# Patient Record
Sex: MALE | Race: WHITE | NOT HISPANIC OR LATINO | Employment: FULL TIME | URBAN - METROPOLITAN AREA
[De-identification: names, ages, dates, MRNs, and addresses within clinical notes are randomized per-mention and may not be internally consistent; named-entity substitution may affect disease eponyms.]

---

## 2017-02-14 DIAGNOSIS — K63.5 COLON POLYPS: Primary | ICD-10-CM

## 2017-02-14 RX ORDER — SODIUM CHLORIDE 0.9 % (FLUSH) 0.9 %
1-10 SYRINGE (ML) INJECTION AS NEEDED
Status: CANCELLED | OUTPATIENT
Start: 2017-02-14

## 2017-02-20 RX ORDER — CLOPIDOGREL BISULFATE 75 MG/1
TABLET ORAL
Qty: 90 TABLET | Refills: 3 | Status: SHIPPED | OUTPATIENT
Start: 2017-02-20 | End: 2018-01-14 | Stop reason: SDUPTHER

## 2017-02-27 ENCOUNTER — TELEPHONE (OUTPATIENT)
Dept: GASTROENTEROLOGY | Facility: CLINIC | Age: 82
End: 2017-02-27

## 2017-02-27 NOTE — TELEPHONE ENCOUNTER
----- Message from Olaf Larry MD sent at 2/21/2017  3:15 PM EST -----  Regarding: RE: Plavix  This is OK from my standpoint.    TT  ----- Message -----     From: Jennie Crowell RN     Sent: 2/20/2017   1:53 PM       To: Olaf Larry MD  Subject: Plavix                                           Dr Larry,  Mr Lopez has submitted OA paperwork requesting to schedule a Colonoscopy with Dr Vega.  Dr Vega recommends that the patient hold his Plavix for 5 days prior to his scheduled colonoscopy if this is okay with you.  Please advise.  Thank you,  Jennie  ----- Message -----     From: Oren HARPER MD     Sent: 2/14/2017   4:49 PM       To: Chase Vo, Jennie Crowell RN  Subject: RE: OA Packet                                    Okay for open access colonoscopy secondary to history of polyps (needs to be cleared by prescribing physician to hold Plavix for 5 days prior to examination)  ----- Message -----     From: Chase Vo     Sent: 2/14/2017   1:35 PM       To: Oren HARPER MD  Subject: OA Packet                                        Scanned to media

## 2017-02-27 NOTE — TELEPHONE ENCOUNTER
Spoke with patient and informed him that Dr Vega recommends holding his Plavix for 5 days prior to his Colonoscopy scheduled 3/29/17(and this was okayed by Dr Larry).  Pt voiced understanding.

## 2017-03-13 RX ORDER — ATORVASTATIN CALCIUM 20 MG/1
TABLET, FILM COATED ORAL
Qty: 30 TABLET | Refills: 5 | Status: SHIPPED | OUTPATIENT
Start: 2017-03-13 | End: 2017-08-28 | Stop reason: SDUPTHER

## 2017-03-29 ENCOUNTER — ANESTHESIA (OUTPATIENT)
Dept: GASTROENTEROLOGY | Facility: HOSPITAL | Age: 82
End: 2017-03-29

## 2017-03-29 ENCOUNTER — ANESTHESIA EVENT (OUTPATIENT)
Dept: GASTROENTEROLOGY | Facility: HOSPITAL | Age: 82
End: 2017-03-29

## 2017-03-29 ENCOUNTER — HOSPITAL ENCOUNTER (OUTPATIENT)
Facility: HOSPITAL | Age: 82
Setting detail: HOSPITAL OUTPATIENT SURGERY
Discharge: HOME | End: 2017-03-29
Attending: INTERNAL MEDICINE | Admitting: INTERNAL MEDICINE

## 2017-03-29 VITALS
HEIGHT: 69 IN | OXYGEN SATURATION: 100 % | WEIGHT: 227 LBS | HEART RATE: 56 BPM | TEMPERATURE: 98.1 F | RESPIRATION RATE: 16 BRPM | DIASTOLIC BLOOD PRESSURE: 62 MMHG | SYSTOLIC BLOOD PRESSURE: 103 MMHG | BODY MASS INDEX: 33.62 KG/M2

## 2017-03-29 DIAGNOSIS — K63.5 COLON POLYPS: ICD-10-CM

## 2017-03-29 PROCEDURE — 45380 COLONOSCOPY AND BIOPSY: CPT | Performed by: INTERNAL MEDICINE

## 2017-03-29 PROCEDURE — 45385 COLONOSCOPY W/LESION REMOVAL: CPT | Performed by: INTERNAL MEDICINE

## 2017-03-29 PROCEDURE — 88305 TISSUE EXAM BY PATHOLOGIST: CPT | Performed by: INTERNAL MEDICINE

## 2017-03-29 PROCEDURE — 25010000002 PROPOFOL 10 MG/ML EMULSION: Performed by: NURSE ANESTHETIST, CERTIFIED REGISTERED

## 2017-03-29 RX ORDER — PROPOFOL 10 MG/ML
VIAL (ML) INTRAVENOUS AS NEEDED
Status: DISCONTINUED | OUTPATIENT
Start: 2017-03-29 | End: 2017-03-29 | Stop reason: SURG

## 2017-03-29 RX ORDER — SODIUM CHLORIDE, SODIUM LACTATE, POTASSIUM CHLORIDE, CALCIUM CHLORIDE 600; 310; 30; 20 MG/100ML; MG/100ML; MG/100ML; MG/100ML
1000 INJECTION, SOLUTION INTRAVENOUS CONTINUOUS PRN
Status: DISCONTINUED | OUTPATIENT
Start: 2017-03-29 | End: 2017-03-29 | Stop reason: HOSPADM

## 2017-03-29 RX ORDER — PROPOFOL 10 MG/ML
VIAL (ML) INTRAVENOUS CONTINUOUS PRN
Status: DISCONTINUED | OUTPATIENT
Start: 2017-03-29 | End: 2017-03-29 | Stop reason: SURG

## 2017-03-29 RX ORDER — LIDOCAINE HYDROCHLORIDE 20 MG/ML
INJECTION, SOLUTION INFILTRATION; PERINEURAL AS NEEDED
Status: DISCONTINUED | OUTPATIENT
Start: 2017-03-29 | End: 2017-03-29 | Stop reason: SURG

## 2017-03-29 RX ADMIN — PROPOFOL 50 MG: 10 INJECTION, EMULSION INTRAVENOUS at 13:53

## 2017-03-29 RX ADMIN — LIDOCAINE HYDROCHLORIDE 60 MG: 20 INJECTION, SOLUTION INFILTRATION; PERINEURAL at 13:53

## 2017-03-29 RX ADMIN — PROPOFOL 300 MCG/KG/MIN: 10 INJECTION, EMULSION INTRAVENOUS at 13:53

## 2017-03-29 RX ADMIN — SODIUM CHLORIDE, POTASSIUM CHLORIDE, SODIUM LACTATE AND CALCIUM CHLORIDE: 600; 310; 30; 20 INJECTION, SOLUTION INTRAVENOUS at 13:50

## 2017-03-29 RX ADMIN — SODIUM CHLORIDE, POTASSIUM CHLORIDE, SODIUM LACTATE AND CALCIUM CHLORIDE 1000 ML: 600; 310; 30; 20 INJECTION, SOLUTION INTRAVENOUS at 13:45

## 2017-03-29 NOTE — ANESTHESIA PREPROCEDURE EVALUATION
Anesthesia Evaluation     Patient summary reviewed and Nursing notes reviewed   NPO Status: > 8 hours   Airway   Mallampati: I  Dental      Pulmonary    (+) asthma,   Cardiovascular     (+) hypertension well controlled, past MI  >12 months, CAD,       Neuro/Psych  GI/Hepatic/Renal/Endo    (+) obesity,      Musculoskeletal     Abdominal    Substance History      OB/GYN          Other                                  Anesthesia Plan    ASA 3     MAC     Anesthetic plan and risks discussed with patient.

## 2017-03-29 NOTE — H&P
[facility]  Pre Procedure History & Physical    Chief Complaint:   History of polyps    Subjective     HPI:   This 81-year-old male presents to the endoscopy suite for colonoscopic evaluation.  He has a history of colon polyps last colonoscopy record was September 2014.    Past Medical History:   Past Medical History:   Diagnosis Date   • Angina pectoris    • Asthma    • Garcia esophagus    • CAD (coronary artery disease)    • Cancer     prostate   • H/O electrocardiogram    • Health care maintenance    • History of EKG 06/17/2015   • Hyperlipidemia    • Hypertension    • Myocardial infarction        Family History:  History reviewed. No pertinent family history.    Social History:   reports that he has quit smoking. He has never used smokeless tobacco. He reports that he does not drink alcohol or use illicit drugs.    Medications:   Prescriptions Prior to Admission   Medication Sig Dispense Refill Last Dose   • ADVAIR DISKUS 250-50 MCG/DOSE DISKUS    3/29/2017 at Unknown time   • atorvastatin (LIPITOR) 20 MG tablet TAKE ONE TABLET BY MOUTH NIGHTLY AT BEDTIME 30 tablet 5 3/28/2017 at Unknown time   • budesonide (RHINOCORT AQUA) 32 MCG/ACT nasal spray 1 spray into each nostril daily.   3/28/2017 at Unknown time   • Cetirizine HCl (ZYRTEC ALLERGY PO) Take  by mouth.   3/28/2017 at Unknown time   • fluticasone-salmeterol (ADVAIR DISKUS) 500-50 MCG/DOSE DISKUS 1 puff 2 (two) times a day.   3/29/2017 at Unknown time   • lisinopril (PRINIVIL,ZESTRIL) 10 MG tablet TAKE 1 TABLET BY MOUTH TWICE A DAY **MUST MAKE APPOINTMENT THIS MONTH FOR FURTHER REFILLS** 180 tablet 3 3/28/2017 at Unknown time   • metoprolol tartrate (LOPRESSOR) 50 MG tablet TAKE ONE TABLET BY MOUTH TWICE DAILY 180 tablet 3 3/28/2017 at Unknown time   • montelukast (SINGULAIR) 10 MG tablet Take 1 tablet by mouth daily.   3/28/2017 at Unknown time   • aspirin 81 MG tablet Take 1 tablet by mouth 2 (two) times a day.   3/23/2017   •  "clopidogrel (PLAVIX) 75 MG tablet TAKE 1 TABLET BY MOUTH DAILY. 90 tablet 3 3/24/2017   • NITROSTAT 0.4 MG SL tablet DISSOLVE ONE TABLET UNDER TONGUE AS NEEDED FOR CHEST PAIN 25 tablet 1 Unknown at Unknown time       Allergies:  Review of patient's allergies indicates no known allergies.    ROS:    Pertinent items are noted in HPI, all other systems reviewed and negative     Objective     Blood pressure 109/69, pulse 60, temperature 98.1 °F (36.7 °C), temperature source Oral, resp. rate 12, height 69\" (175.3 cm), weight 227 lb (103 kg), SpO2 95 %.    Physical Exam   Constitutional: Pt is oriented to person, place, and time and well-developed, well-nourished, and in no distress.   HENT:   Mouth/Throat: Oropharynx is clear and moist.   Neck: Normal range of motion. Neck supple.   Cardiovascular: Normal rate, regular rhythm and normal heart sounds.    Pulmonary/Chest: Effort normal and breath sounds normal. No respiratory distress. No  wheezes.   Abdominal: Soft. Bowel sounds are normal.   Skin: Skin is warm and dry.   Psychiatric: Mood, memory, affect and judgment normal.     Assessment/Plan     Diagnosis:  Polyps    Anticipated Surgical Procedure:  Colonoscopy    The risks, benefits, and alternatives of this procedure have been discussed with the patient or the responsible party- the patient understands and agrees to proceed.                                                                "

## 2017-03-29 NOTE — DISCHARGE INSTRUCTIONS
WHAT ARE DIVERTICULOSIS AND DIVERTICULITIS?  Many people have small pouches in their colons that bulge outward through weak spots, like an inner tube that pokes through weak places in a tire.  Each pouch is called a diverticulum.  The condition of having diverticula is DIVERTICULOSIS.  The condition becomes more common as people age.  About half of all people over the age of 60 have diverticulosis    When pouches become infected or inflamed, the condition is called DIVERTICULITIS.  This happens in 10% to 25% of people with diverticulosis.  Diverticulosis and diverticulitis are also called DIVERTICULAR DISEASE.     WHAT ARE THE SYMPTOMS?  Diverticulosis - Most people do not have any discomfort or symptoms.  However, symptoms may include mild cramps, bloating, and constipation.  Other diseases such as irritable bowel syndrome (IBS) and stomach ulcers cause similar problems, so these symptoms do not always mean a person has diverticulosis.  You should visit your doctor if you have these troubling symptoms.    Diverticulitis - The most common symptom is abdominal pain.  The most common sign is tenderness around the left side of the lower abdomen.  If infection is the cause, fever, nausea, vomiting, chills, cramping, and constipation may occur as well.  The severity depends on the extent of the infection and complications.    WHAT ARE THE COMPLICATIONS?  Diverticulitis can lead to bleeding, infections,perforations or tears, or blockages.  These complications always require treatment to prevent them from proggressing and causing serous illness.    Bleeding from a diverticula is a rare complication.  When this occurs, blood may appear in the toilet or in your stool.  Bleeding can be severe, but it may stop by itself and not require treatment.  Doctors believe bleeding diverticula are caused by a small blood vessel in a diverticulum that weakens and finally bursts.  If you have bleeding from the rectum, you should see your  doctor.  If the bleeding does not stop you may need surgery.    Abscess, Perforation, and Peritonitis - The infection causing diverticulitis often clears up after a few days of treatment with antibiotics.  If the condition gets worse, an abscess may form in the colon.  An abscess is an infected area with pus that may cause swelling and destroy tissue.  Sometimes the infected diverticula may develop small holes, called perforations.  These perforations allow pus to leak out of the colon into the abdominal area.  If the abscess is small and remains in the colon, it may clear up after treatment with antibiotics.  If not, the doctor may need to drain it.  A large abscess can become a serious problem if the infection leaks out and contaminates areas outside the colon.  Infection that spreads into the abdominal cavity is called peritonitis.  Peritonitis requires immediate surgery toclean the abdominal cavity and remove the damaged part of the colon.  Without surgery, peritonitis can be fatal.    FISTULA  A fistula is an abnormal connection of tissue between two organs or between an organ and the skin.  When damaged tissues come into contact with each other during infection, they sometimes stick together.  If they heal that way, a fistula forms.  When diverticulitis-related infection spreads through out the colon, the colon's tissue may stick to nearby tissues.  The organs usually involved are the bladder, small intestine, and skin.  The problem can be corrected with surgery to remove the fistula and affected part of the colon.    INTESTINAL OBSTRUCTION  The scarring caused by infection may cause partial or total blockage of the large intestine.  When this happens, the colon is unable to move bowel contents normally.  When the obstruction totally blocks the intestine, emergency surgery is necessary.  Partial blockage is not an emergency, so the surgery to correct it can be planned.    WHAT CAUSES DIVERTICULAR  DISEASE  Although not proven, the dominant theory is that a low-fiber diet is the main cause of diverticular disease.  The disease was first noticed in the [place] in the early 1900s.  At about the same time, processed foods were introduced into the American diet.  Many processed foods contain refined, low-fiber flour.  Unlike whole-wheat flour, refined flour has no wheat bran.    Diverticular disease is common in developed or industrialized countries-particularly the [place], [place], and [place]-where low-fiber diets are common.  The disease is rare in countries of [place] and [place], where people eat high-fiber vegetable diets.    Fiber is the part of fruits, vegetables, and whole grains that the body cannot digest.  Some fiber dissolves easily in water (soluble fiber).  It takes on a soft, jelly-like texture in the intestines.  Some fiber passes almost unchanged through the intestines (insoluble fiber).  Both kinds of fiber help make stools soft and easy to pass.  Fiber also prevents constipation.    Constipation makes the muscles strain to move stool that is too hard.  It is the main cause of increased pressure in the colon.  This excess pressure might cause the weak spots in the colon to bulge out and become diverticula.  Diverticulitis occurs when diverticula become infected or inflamed.  Doctors are not certain what causes the infection.  It may begin when stool or bacteria are caught in the diverticula.  An attack of diverticulitis can develop suddenly and without warning.    HOW DOES THE DOCTOR DIAGNOSE DIVERTICULAR DISEASE  The doctor asks about medical history, does a physical exam, and may perform one or more diagnostic tests.  Because most people do not have symptoms, diverticulosis is often found through tests ordered for another ailment.    When taking a medical history, the doctor may ask about bowel habits, symptoms, pain, diet, and medications.  The physical exam usually involves a  digital rectal exam.  To preform this test. The doctor inserts a gloved, lubricated finger into the rectum to detect tenderness, blockage, or blood.  The doctor may check stool for signs of bleeding and test blood for signs of infection.  The doctor may also order x-rays or other tests.    WHAT IS THE TREATMENT FOR DIVERTICULAR DISEASE  Increasing the amount of fiber in the diet may reduce symptoms of diverticulosis and prevent complications such as diverticulitis.  Fiber keeps stool soft and lowers pressure inside the colon so that bowel contents can move through easily.  The American Dietetic Association. Recommends 20 to 35 grams of fiber each day.  The doctor may also recommend taking a fiber product such as Citrucel or Metamucil once a dya.  These products are mixed water and provide about 2 to 3.5 grams of fiber per  Tablespoon, mixed with 8 ounces of water.    Avoidance of nuts, popcorn, and sunflower, pumpkin, yoni, and sesame seeds has been recommended by physicians out of fear that food particles could enter, block, or irritate the diverticula.  However, no scientific data support this treatment measure.  Eating a high-fiber diet is the only requirement highly emphasized across the medical literature.  Eliminating specific foods is not necessary.  The seeds in tomatoes, zucchini, cucumbers, strawberries, and raspberries, as well as poppy seeds, are generally considered harmless.  People differ in amounts and types of foods the can eat.  Decisions about diet should be made based on what works best for each person.  Keeping a food diary may help identify what foods may cause symptoms.    If cramps, bloating, and constipation are problems, the doctor may prescribe  Short course of pain medication.  However, many medications affect emptying of the colon, an undesirable side effect for people with diverticulosis.    DIVERTICULITIS  Treatment focuses on clearing up the infection and inflammation, resting the  colon, and preventing or minimizing complications.  An attack of diverticulitis without complications may respond to antibiotics within a few days if treated early.  To help the colon rest, the doctor may recommend bed rest and a liquid diet, along with a pain reliever.    An acute attack with severe pain or sever infection may require a hospital stay.  Most acute cases of diverticulitis are treated with antibiotics and a liquid diet.  The antibiotics are given by injection into a vein.  In some cases, however, surgery may be necessary.    WHEN IS SURGERY NECESSARY  If attacks are severe or frequent, the doctor may advise surgery.  The surgeon removes the affected part of the colon and joins the remaining sections.  This typed of surgery, called colon resection, aims to keep attacks from coming back and to prevent complications.  The doctor may also recommend surgery for complications of a fistula or intestinal obstruction.    If antibiotics do not correct an attack, emergency surgery may be required.  Other reasons for emergency surgery include a large abscess, perforation, peritonitis, or continued bleeding.    Emergency surgery usually involves 2 operations.  The first will clear the infected abdominal cavity and remove part of the colon.  Because infection and sometimes obstruction, it is not safe to rejoin the colon during the first operation.  Instead, the surgeon creates a temporary hole, or stoma, in the abdomen.  The end of the colon is connected to the hole, a procedure called a colostomy, to allow normal eating and bowel movements.  The stool goes into a bag attached to the opening in the abdomen.  In the second operation, the surgeon rejoins the ends of the colon.

## 2017-03-29 NOTE — ANESTHESIA POSTPROCEDURE EVALUATION
Patient: Raymundo Lopez    Procedure Summary     Date Anesthesia Start Anesthesia Stop Room / Location    03/29/17 1350 1420  [facility] /  [facility]       Procedure Diagnosis Surgeon Provider    COLONOSCOPY TO CECUM & T.I. WITH COLD POLYPECTOMIES,  HOT SNARE POLYPECTOMIES (N/A ) Diverticulosis; Colon polyps; Hemorrhoids  (Colon polyps [K63.5]) MD Kinza Turk MD          Anesthesia Type: MAC  Last vitals  /62 (03/29/17 1442)    Temp      Pulse 56 (03/29/17 1442)   Resp 16 (03/29/17 1442)    SpO2 100 % (03/29/17 1442)      Post Anesthesia Care and Evaluation    Patient location during evaluation: PACU  Patient participation: complete - patient participated  Level of consciousness: awake  Pain score: 0  Pain management: adequate  Airway patency: patent  Anesthetic complications: No anesthetic complications    Cardiovascular status: acceptable  Respiratory status: acceptable  Hydration status: acceptable

## 2017-03-29 NOTE — PLAN OF CARE
Problem: Patient Care Overview (Adult)  Goal: Plan of Care Review  Outcome: Ongoing (interventions implemented as appropriate)    03/29/17 1316   Coping/Psychosocial Response Interventions   Plan Of Care Reviewed With patient   Patient Care Overview   Progress no change       Goal: Adult Individualization and Mutuality  Outcome: Ongoing (interventions implemented as appropriate)  Goal: Discharge Needs Assessment  Outcome: Ongoing (interventions implemented as appropriate)    03/29/17 1316   Discharge Needs Assessment   Concerns To Be Addressed no discharge needs identified   Discharge Disposition home or self-care   Living Environment   Transportation Available car;family or friend will provide         Problem: GI Endoscopy (Adult)  Goal: Signs and Symptoms of Listed Potential Problems Will be Absent or Manageable (GI Endoscopy)  Outcome: Ongoing (interventions implemented as appropriate)    03/29/17 1316   GI Endoscopy   Problems Assessed (GI Endoscopy) all   Problems Present (GI Endoscopy) none

## 2017-03-30 LAB
CYTO UR: NORMAL
LAB AP CASE REPORT: NORMAL
Lab: NORMAL
PATH REPORT.FINAL DX SPEC: NORMAL
PATH REPORT.GROSS SPEC: NORMAL

## 2017-03-31 ENCOUNTER — TELEPHONE (OUTPATIENT)
Dept: GASTROENTEROLOGY | Facility: CLINIC | Age: 82
End: 2017-03-31

## 2017-03-31 NOTE — TELEPHONE ENCOUNTER
Call to pt. Advise per path report that two polyps that were removed were not cancerous, but precancerous.  Advise per Dr Vega that recommend f/u c/s in 3 yrs, office f/u sooner as needed.  Pt verb understanding.    C/s for 3/30/20 placed in recall.

## 2017-03-31 NOTE — TELEPHONE ENCOUNTER
----- Message from Oren HARPER MD sent at 3/30/2017  3:34 PM EDT -----  Regarding: Biopsy results  Okay to call results, recommend follow-up colonoscopy in 3 years, office follow up sooner as needed  ----- Message -----     From: Lab, Background User     Sent: 3/30/2017   2:10 PM       To: Oren HARPER MD

## 2017-08-14 RX ORDER — LISINOPRIL 10 MG/1
TABLET ORAL
Qty: 60 TABLET | Refills: 0 | Status: SHIPPED | OUTPATIENT
Start: 2017-08-14 | End: 2017-10-05 | Stop reason: SDUPTHER

## 2017-08-21 RX ORDER — METOPROLOL TARTRATE 50 MG/1
TABLET, FILM COATED ORAL
Qty: 180 TABLET | Refills: 3 | Status: SHIPPED | OUTPATIENT
Start: 2017-08-21 | End: 2018-08-15 | Stop reason: SDUPTHER

## 2017-08-28 RX ORDER — ATORVASTATIN CALCIUM 20 MG/1
TABLET, FILM COATED ORAL
Qty: 30 TABLET | Refills: 11 | Status: SHIPPED | OUTPATIENT
Start: 2017-08-28 | End: 2018-08-06 | Stop reason: SDUPTHER

## 2017-10-05 RX ORDER — LISINOPRIL 10 MG/1
TABLET ORAL
Qty: 60 TABLET | Refills: 0 | Status: SHIPPED | OUTPATIENT
Start: 2017-10-05 | End: 2017-10-28 | Stop reason: SDUPTHER

## 2017-10-17 ENCOUNTER — APPOINTMENT (OUTPATIENT)
Dept: GENERAL RADIOLOGY | Facility: HOSPITAL | Age: 82
End: 2017-10-17

## 2017-10-17 ENCOUNTER — HOSPITAL ENCOUNTER (EMERGENCY)
Facility: HOSPITAL | Age: 82
Discharge: HOME | End: 2017-10-17
Attending: EMERGENCY MEDICINE | Admitting: EMERGENCY MEDICINE

## 2017-10-17 VITALS
SYSTOLIC BLOOD PRESSURE: 140 MMHG | HEIGHT: 70 IN | OXYGEN SATURATION: 96 % | TEMPERATURE: 98 F | HEART RATE: 54 BPM | WEIGHT: 220 LBS | BODY MASS INDEX: 31.5 KG/M2 | DIASTOLIC BLOOD PRESSURE: 85 MMHG | RESPIRATION RATE: 18 BRPM

## 2017-10-17 DIAGNOSIS — S61.315A LACERATION OF LEFT RING FINGER WITHOUT FOREIGN BODY WITH DAMAGE TO NAIL, INITIAL ENCOUNTER: Primary | ICD-10-CM

## 2017-10-17 PROCEDURE — 25010000002 TDAP 5-2.5-18.5 LF-MCG/0.5 SUSPENSION: Performed by: PHYSICIAN ASSISTANT

## 2017-10-17 PROCEDURE — 99282 EMERGENCY DEPT VISIT SF MDM: CPT

## 2017-10-17 PROCEDURE — 90715 TDAP VACCINE 7 YRS/> IM: CPT | Performed by: PHYSICIAN ASSISTANT

## 2017-10-17 PROCEDURE — 90471 IMMUNIZATION ADMIN: CPT | Performed by: PHYSICIAN ASSISTANT

## 2017-10-17 PROCEDURE — 73140 X-RAY EXAM OF FINGER(S): CPT

## 2017-10-17 RX ORDER — LIDOCAINE HYDROCHLORIDE 10 MG/ML
INJECTION, SOLUTION INFILTRATION; PERINEURAL
Status: COMPLETED
Start: 2017-10-17 | End: 2017-10-17

## 2017-10-17 RX ORDER — LIDOCAINE HYDROCHLORIDE 10 MG/ML
10 INJECTION, SOLUTION INFILTRATION; PERINEURAL ONCE
Status: COMPLETED | OUTPATIENT
Start: 2017-10-17 | End: 2017-10-17

## 2017-10-17 RX ADMIN — LIDOCAINE HYDROCHLORIDE: 10 INJECTION, SOLUTION INFILTRATION; PERINEURAL at 19:39

## 2017-10-17 RX ADMIN — TETANUS TOXOID, REDUCED DIPHTHERIA TOXOID AND ACELLULAR PERTUSSIS VACCINE, ADSORBED 0.5 ML: 5; 2.5; 8; 8; 2.5 SUSPENSION INTRAMUSCULAR at 20:03

## 2017-10-17 NOTE — ED TRIAGE NOTES
Patient was cutting shrubs outside his house and accidentally cut his left ring finger, bleeding controlled.

## 2017-10-17 NOTE — ED PROVIDER NOTES
EMERGENCY DEPARTMENT ENCOUNTER    CHIEF COMPLAINT  Chief Complaint: Finger Laceration  History given by:Patient  History limited by:Nothing  Room Number: 34/34  PMD: Ryan Alvarez MD      HPI:  Pt is a 82 y.o. male who presents to the ED after the patient sustained a laceration to his left fourth finger around 11:00AM while he was pruning shrubs outside his home. The patient explains that the gm were brand new and he immediately wrapped his finger due to the blood coming from the laceration. He denies any loss of sensation / motor function. The patient states that his tetanus was last updated approximately 8 years ago and he has no other complaints.     Duration: 6 hours  Timing:Constant  Location:Left fourth finger  Radiation:None  Quality:Dull  Intensity/Severity:Moderate  Progression:No Change  Associated Symptoms:Laceration  Aggravating Factors:Palpation  Alleviating Factors:Rest  Previous Episodes:None  Treatment before arrival:None    MEDICAL RECORD REVIEW  H/o HTN and CAD on ASA 81mg     PAST MEDICAL HISTORY  Active Ambulatory Problems     Diagnosis Date Noted   • No Active Ambulatory Problems     Resolved Ambulatory Problems     Diagnosis Date Noted   • No Resolved Ambulatory Problems     Past Medical History:   Diagnosis Date   • Angina pectoris    • Asthma    • Garcia esophagus    • CAD (coronary artery disease)    • Cancer    • H/O electrocardiogram    • Health care maintenance    • History of EKG 06/17/2015   • Hyperlipidemia    • Hypertension    • Myocardial infarction        PAST SURGICAL HISTORY  Past Surgical History:   Procedure Laterality Date   • ANAL FISTULA REPAIR     • COLONOSCOPY N/A 3/29/2017    Procedure: COLONOSCOPY TO CECUM & T.I. WITH COLD POLYPECTOMIES,  HOT SNARE POLYPECTOMIES;  Surgeon: Oren HARPER MD;  Location: [facility] ENDOSCOPY;  Service:    • ENDOSCOPY N/A 11/29/2016    Procedure: ESOPHAGOGASTRODUODENOSCOPY WITH BIOPSIES;  Surgeon: Oren HARPER MD;   Location: [facility] ENDOSCOPY;  Service:    • PROSTATECTOMY         FAMILY HISTORY  History reviewed. No pertinent family history.    SOCIAL HISTORY  Social History     Social History   • Marital status:      Spouse name: N/A   • Number of children: N/A   • Years of education: N/A     Occupational History   • Not on file.     Social History Main Topics   • Smoking status: Former Smoker   • Smokeless tobacco: Never Used   • Alcohol use No   • Drug use: No   • Sexual activity: Defer     Other Topics Concern   • Not on file     Social History Narrative       ALLERGIES  Review of patient's allergies indicates no known allergies.    REVIEW OF SYSTEMS  Review of Systems   Constitutional: Negative.  Negative for chills and fever.   HENT: Negative.  Negative for sore throat.    Eyes: Negative.    Respiratory: Negative.  Negative for cough.    Cardiovascular: Negative.  Negative for chest pain.   Gastrointestinal: Negative.    Genitourinary: Negative.  Negative for dysuria.   Musculoskeletal: Negative.  Negative for back pain.   Skin: Negative.  Negative for rash.        Laceration   Neurological: Negative.  Negative for headaches.       PHYSICAL EXAM  ED Triage Vitals   Temp Heart Rate Resp BP SpO2   10/17/17 1545 10/17/17 1545 10/17/17 1545 10/17/17 1623 10/17/17 1545   98 °F (36.7 °C) 76 18 129/82 95 %      Temp src Heart Rate Source Patient Position BP Location FiO2 (%)   10/17/17 1545 10/17/17 1545 -- -- --   Tympanic Monitor          Physical Exam   Constitutional: He is oriented to person, place, and time and well-developed, well-nourished, and in no distress. No distress.   HENT:   Head: Normocephalic and atraumatic.   Mouth/Throat: Oropharynx is clear and moist.   Eyes: EOM are normal.   Neck: Normal range of motion.   Pulmonary/Chest: Effort normal. No respiratory distress.   Musculoskeletal:   FROM left fourth finger   Neurological: He is alert and oriented to person, place, and time.   Normal distal sensation  "to her left hand.    Skin: Skin is warm and dry. No rash noted. No pallor.   2.5 cm lac distal tip left fourth digit with minimal lateral nail involvement.    Psychiatric: Mood, memory, affect and judgment normal.   Nursing note and vitals reviewed.      RADIOLOGY  XR Finger 2+ View Left   Final Result        Reviewed XR L Finger - There is a prominent soft tissue laceration at the tip of the 4th finger. There is no evidence of fracture or radiopaque foreign body. Independently viewed by me. Interpreted by radiologist.     I ordered the above noted radiological studies and reviewed the images on the PACS system.        PROCEDURE    LACERATION REPAIR  Date/Time: 10/17/17; 1922  Performed by: GARTH JUAREZ  Authorized by: Dr.Molinari Townsend status:  UTD  Consent: Verbal consent obtained. Written consent obtained.  Risks and benefits: risks, benefits and alternatives were discussed  Consent given by: patient   Discussed procedure with pt and available family.  Disc pros/cons, alternatives, risks/benefits, meds, poss complications, anticipated outcome, poss side effects, prognosis, possible infection despite immaculate wound care, etc.    +Verbal consent obtained for procedure from patient.  Patient understanding: patient states understanding of the procedure being performed  Patient consent: the patient's understanding of the procedure matches consent given  Test results: test results available and properly labeled  Site marked: the operative site was marked  Imaging studies: imaging studies available  Patient identity confirmed: verbally with patient and arm band  Time out: Immediately prior to procedure a \"time out\" was called to verify the correct patient, procedure, equipment, support staff and site/side marked as required.  Body area: Left fourth finger  Location details: Distal finger  Laceration length: 2.5 cm   Contamination: none  Foreign bodies: no foreign bodies  Tendon involvement: None  Nerve involvement: " none  Vascular damage: no  Anesthesia: local infiltration  Local anesthetic: lidocaine 1%without   Anesthetic total: 7 mL  Patient sedated: no  Preparation: Patient was prepped and draped in the usual sterile fashion.  Irrigation solution: saline  Irrigation method: syringe  Amount of cleaning: extensive  Debridement: none  Degree of undermining: none  Skin closure: 5-0 Nylon  Number of sutures: 5  Technique: simple  Approximation: close  Approximation difficulty: simple  Dressing: 4x4 sterile gauze and antibiotic ointment  Patient tolerance: Patient tolerated the procedure well with no immediate complications.      COURSE & MEDICAL DECISION MAKING  Pertinent Imaging studies that were ordered and reviewed are noted above.  Results were reviewed/discussed with the patient and they were also made aware of online assess.  Pt also made aware that some labs, such as cultures, will not be resulted during ER visit and follow up with PMD is necessary.       PROGRESS AND CONSULTS    Progress Notes:    1825  Reviewed pt's history and workup with Dr. Perez.  After a bedside evaluation; Dr Perez agrees with the plan of care    1931  The patient's history, physical exam, and imaging studies were discussed with the physician, who also performed a face to face history and physical exam.  I discussed all results and noted any abnormalities with patient.  Discussed absoute need to recheck abnormalities with their family physician.  I answered any of the patient's questions.  Discussed plan for discharge, as there is no emergent indication for admission.  Pt is agreeable and understands need for follow up and repeat testing.  Pt is aware that discharge does not mean that nothing is wrong but it indicates no emergency is present and they must continue care with their family physician.  Pt is discharged with instructions to follow up with primary care doctor to have their blood pressure rechecked.       MEDICATIONS GIVEN IN  "ER  Medications   lidocaine (XYLOCAINE) 1 % injection 10 mL (not administered)   Tdap (BOOSTRIX) injection 0.5 mL (not administered)       /82  Pulse 64  Temp 98 °F (36.7 °C) (Tympanic)   Resp 18  Ht 70\" (177.8 cm)  Wt 220 lb (99.8 kg)  SpO2 95%  BMI 31.57 kg/m2      DIAGNOSIS  Final diagnoses:   Laceration of left ring finger without foreign body with damage to nail, initial encounter       FOLLOW UP   Ryan Alvarez MD  [address] [address]  [phone]            RX     Medication List      Changed          ADVAIR DISKUS 250-50 MCG/DOSE DISKUS   Generic drug:  fluticasone-salmeterol   What changed:  Another medication with the same name was removed. Continue   taking this medication, and follow the directions you see here.         Stop          RHINOCORT AQUA 32 MCG/ACT nasal spray   Generic drug:  budesonide       ZYRTEC ALLERGY PO           Documentation assistance provided by ashanti Tristan for Betsy Lockett PA-C.  Information recorded by the scribe was done at my direction and has been verified and validated by me.  Electronically signed by Betsy Lockett PA-C on 10/17/2017 at time 7:35 PM            Rashawn Tristan  10/17/17 1936       Betsy Lockett PA-C  10/17/17 1936    "

## 2017-10-17 NOTE — ED PROVIDER NOTES
Pt c/o a laceration to his left ring finger which occurred earlier today while he using clippers to prune his shrubs. Pt denies numbness to finger. Pt has no other medical complaints at this time.    PEx  Laceration to left distal ring finger that is about 2.5 cm, on lateral aspect of finger at the edge of nail, appears to be through and through.  Pt is neurovascularly in tact      Agree with plan to anesthestized, clean and repair wound.    I supervised care provided by the midlevel provider.    We have discussed this patient's history, physical exam, and treatment plan.   I have reviewed the note and personally saw and examined the patient and agree with the plan of care.    Documentation assistance provided by scribAditya Phillips for Dr. Perez.  Information recorded by the scribe was done at my direction and has been verified and validated by me.       Juli Phillips  10/17/17 1831       Jose Perez MD  10/17/17 1953

## 2017-10-17 NOTE — DISCHARGE INSTRUCTIONS
PLEASE READ AND REVIEW ALL DISCHARGE INSTRUCTIONS.     Please follow up with your primary care physician for any further evaluation/treatment and further management of your blood pressure.     Recheck in emergency department for any worsening and/or concerning symptoms.     Take all prescribed medicine as written and continue chronic medication.    Have your sutures removed in 7 days.  Keep area clean, dry, and covered.

## 2017-10-19 ENCOUNTER — TELEPHONE (OUTPATIENT)
Dept: SOCIAL WORK | Facility: HOSPITAL | Age: 82
End: 2017-10-19

## 2017-10-19 NOTE — TELEPHONE ENCOUNTER
ER F/U phone call:   Pt states that he is doing just fine. Denies the need to see his PCP. No other questions or concerns voiced at this time. Beth Ferreira RN

## 2017-10-30 RX ORDER — LISINOPRIL 10 MG/1
TABLET ORAL
Qty: 60 TABLET | Refills: 1 | Status: SHIPPED | OUTPATIENT
Start: 2017-10-30 | End: 2017-12-22 | Stop reason: SDUPTHER

## 2017-10-31 ENCOUNTER — OFFICE VISIT (OUTPATIENT)
Dept: INTERNAL MEDICINE | Facility: CLINIC | Age: 82
End: 2017-10-31

## 2017-10-31 VITALS
SYSTOLIC BLOOD PRESSURE: 120 MMHG | DIASTOLIC BLOOD PRESSURE: 80 MMHG | HEIGHT: 70 IN | BODY MASS INDEX: 34.07 KG/M2 | WEIGHT: 238 LBS

## 2017-10-31 DIAGNOSIS — I25.10 CORONARY ARTERY DISEASE INVOLVING NATIVE CORONARY ARTERY OF NATIVE HEART WITHOUT ANGINA PECTORIS: ICD-10-CM

## 2017-10-31 DIAGNOSIS — S61.215A LACERATION OF LEFT RING FINGER WITHOUT FOREIGN BODY WITHOUT DAMAGE TO NAIL, INITIAL ENCOUNTER: Primary | ICD-10-CM

## 2017-10-31 DIAGNOSIS — J45.20 INTERMITTENT ASTHMA WITHOUT COMPLICATION, UNSPECIFIED ASTHMA SEVERITY: ICD-10-CM

## 2017-10-31 PROCEDURE — 99214 OFFICE O/P EST MOD 30 MIN: CPT | Performed by: INTERNAL MEDICINE

## 2017-12-22 RX ORDER — LISINOPRIL 10 MG/1
10 TABLET ORAL
Qty: 60 TABLET | Refills: 5 | Status: SHIPPED | OUTPATIENT
Start: 2017-12-22 | End: 2018-04-26 | Stop reason: SDUPTHER

## 2018-01-15 RX ORDER — CLOPIDOGREL BISULFATE 75 MG/1
TABLET ORAL
Qty: 90 TABLET | Refills: 3 | Status: SHIPPED | OUTPATIENT
Start: 2018-01-15 | End: 2019-01-09 | Stop reason: SDUPTHER

## 2018-04-26 RX ORDER — LISINOPRIL 10 MG/1
10 TABLET ORAL
Qty: 60 TABLET | Refills: 2 | Status: SHIPPED | OUTPATIENT
Start: 2018-04-26 | End: 2018-07-29 | Stop reason: SDUPTHER

## 2018-05-10 ENCOUNTER — OFFICE VISIT (OUTPATIENT)
Dept: CARDIOLOGY | Facility: CLINIC | Age: 83
End: 2018-05-10

## 2018-05-10 VITALS
HEIGHT: 70 IN | BODY MASS INDEX: 33.36 KG/M2 | HEART RATE: 64 BPM | DIASTOLIC BLOOD PRESSURE: 80 MMHG | WEIGHT: 233 LBS | SYSTOLIC BLOOD PRESSURE: 138 MMHG

## 2018-05-10 DIAGNOSIS — I25.118 CORONARY ARTERY DISEASE OF NATIVE ARTERY OF NATIVE HEART WITH STABLE ANGINA PECTORIS (HCC): Primary | ICD-10-CM

## 2018-05-10 PROCEDURE — 99213 OFFICE O/P EST LOW 20 MIN: CPT | Performed by: INTERNAL MEDICINE

## 2018-05-10 PROCEDURE — 93000 ELECTROCARDIOGRAM COMPLETE: CPT | Performed by: INTERNAL MEDICINE

## 2018-05-10 NOTE — PROGRESS NOTES
Subjective:     Encounter Date:05/10/2018      Patient ID: Raymudno Lopez is a 82 y.o. male.    Chief Complaint: CAD    History of Present Illness    Dear Dr. Alvarez,     I had the pleasure of seeing your patient in cardiac followup today.  As you well know, he is a bhaskar 82-year-old man with history of coronary artery disease status post myocardial infarction caused by stenosis of his diagonal branch.  He had balloon angioplasty and did well.  The rest of his coronaries had moderate diffuse disease.      He has had chronic class I stable angina for many years.  He is quite satisfied with his current level of functioning.     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  He traveled to [facility] for vacation.  He is quite active without significant limitation.          Review of Systems   All other systems reviewed and are negative.        ECG 12 Lead  Date/Time: 5/10/2018 12:38 PM  Performed by: JOANNA BEAN  Authorized by: JOANNA BEAN   Comparison: compared with previous ECG   Similar to previous ECG  Rhythm: sinus rhythm  Ectopy: atrial premature contractions  BPM: 64                 Objective:     Physical Exam   Constitutional: He is oriented to person, place, and time. He appears well-developed and well-nourished.   HENT:   Head: Normocephalic and atraumatic.   Neck: Normal range of motion. Neck supple.   Cardiovascular: Normal rate, regular rhythm and normal heart sounds.    Pulmonary/Chest: Effort normal and breath sounds normal.   Abdominal: Soft. Bowel sounds are normal.   Musculoskeletal: Normal range of motion.   Neurological: He is alert and oriented to person, place, and time.   Skin: Skin is warm and dry.   Psychiatric: He has a normal mood and affect. His behavior is normal. Thought content normal.   Vitals reviewed.      Lab Review:       Assessment:          Diagnosis Plan   1. Coronary artery disease of native artery of native heart with stable angina pectoris            Plan:        It was a pleasure to see your patient in cardiac followup today.  He is doing well from the cardiac standpoint without any complaints.  He has class I stable angina but this is without change.  He is taking his medications without difficulty.  He has asymptomatic atrial bigeminy today.  I have recommended no therapy.  He will see me again in one year or sooner if symptoms warrant.      Coronary Artery Disease  Assessment  • The patient has CCS class I - angina only during strenuous or prolonged physical activity  • The patient has stable angina     Plan  • Lifestyle modifications discussed include adhering to a heart healthy diet, avoidance of tobacco products, maintenance of a healthy weight, medication compliance, regular exercise and regular monitoring of cholesterol and blood pressure    Subjective - Objective  • There is a history of past MI  • There has been a previous POBA  • Current antiplatelet therapy includes aspirin 81 mg and clopidogrel 75 mg

## 2018-07-30 RX ORDER — LISINOPRIL 10 MG/1
10 TABLET ORAL
Qty: 180 TABLET | Refills: 3 | Status: SHIPPED | OUTPATIENT
Start: 2018-07-30 | End: 2019-07-11 | Stop reason: SDUPTHER

## 2018-08-06 RX ORDER — ATORVASTATIN CALCIUM 20 MG/1
TABLET, FILM COATED ORAL
Qty: 30 TABLET | Refills: 11 | Status: SHIPPED | OUTPATIENT
Start: 2018-08-06 | End: 2019-05-29 | Stop reason: SDUPTHER

## 2018-08-15 RX ORDER — METOPROLOL TARTRATE 50 MG/1
TABLET, FILM COATED ORAL
Qty: 180 TABLET | Refills: 1 | Status: SHIPPED | OUTPATIENT
Start: 2018-08-15 | End: 2019-02-04 | Stop reason: SDUPTHER

## 2018-10-03 ENCOUNTER — OFFICE VISIT (OUTPATIENT)
Dept: INTERNAL MEDICINE | Facility: CLINIC | Age: 83
End: 2018-10-03

## 2018-10-03 ENCOUNTER — APPOINTMENT (OUTPATIENT)
Dept: WOMENS IMAGING | Facility: HOSPITAL | Age: 83
End: 2018-10-03

## 2018-10-03 VITALS
HEIGHT: 70 IN | TEMPERATURE: 98.3 F | BODY MASS INDEX: 33.64 KG/M2 | DIASTOLIC BLOOD PRESSURE: 74 MMHG | WEIGHT: 235 LBS | SYSTOLIC BLOOD PRESSURE: 124 MMHG | OXYGEN SATURATION: 95 % | HEART RATE: 62 BPM

## 2018-10-03 DIAGNOSIS — R05.9 COUGH: Primary | ICD-10-CM

## 2018-10-03 DIAGNOSIS — R07.81 RIB PAIN ON RIGHT SIDE: ICD-10-CM

## 2018-10-03 DIAGNOSIS — J40 BRONCHITIS: ICD-10-CM

## 2018-10-03 PROBLEM — I20.9 ANGINA PECTORIS: Status: ACTIVE | Noted: 2018-10-03

## 2018-10-03 PROCEDURE — 71100 X-RAY EXAM RIBS UNI 2 VIEWS: CPT | Performed by: NURSE PRACTITIONER

## 2018-10-03 PROCEDURE — 71046 X-RAY EXAM CHEST 2 VIEWS: CPT | Performed by: NURSE PRACTITIONER

## 2018-10-03 PROCEDURE — 99213 OFFICE O/P EST LOW 20 MIN: CPT | Performed by: NURSE PRACTITIONER

## 2018-10-03 PROCEDURE — 71046 X-RAY EXAM CHEST 2 VIEWS: CPT | Performed by: RADIOLOGY

## 2018-10-03 PROCEDURE — 71100 X-RAY EXAM RIBS UNI 2 VIEWS: CPT | Performed by: RADIOLOGY

## 2018-10-03 RX ORDER — AZITHROMYCIN 250 MG/1
TABLET, FILM COATED ORAL
Qty: 6 TABLET | Refills: 0 | Status: SHIPPED | OUTPATIENT
Start: 2018-10-03 | End: 2019-05-15

## 2018-10-03 NOTE — PROGRESS NOTES
Subjective   Raymundo Lopez is a 83 y.o. male.     He was coughing this morning and he felt a crack. He is not sure if he cracked his rib.       Cough   This is a new problem. The problem has been gradually improving. The cough is productive of sputum. Associated symptoms include chills, postnasal drip and shortness of breath (stable ). Pertinent negatives include no chest pain, ear congestion, ear pain, fever, headaches, heartburn, rhinorrhea, sore throat or wheezing. Associated symptoms comments: Fatigue   . Treatments tried: mucinex, advair  The treatment provided moderate relief.   URI    Associated symptoms include coughing. Pertinent negatives include no abdominal pain, chest pain, congestion, diarrhea, ear pain, headaches, nausea, neck pain, rhinorrhea, sneezing, sore throat, vomiting or wheezing.        The following portions of the patient's history were reviewed and updated as appropriate: allergies, current medications, past family history, past medical history, past social history, past surgical history and problem list.    Review of Systems   Constitutional: Positive for chills. Negative for appetite change, fatigue and fever.   HENT: Positive for postnasal drip. Negative for congestion, ear discharge, ear pain, facial swelling, hearing loss, rhinorrhea, sinus pressure, sneezing, sore throat and tinnitus.    Respiratory: Positive for cough and shortness of breath (stable ). Negative for chest tightness and wheezing.    Cardiovascular: Negative for chest pain, palpitations and leg swelling.   Gastrointestinal: Negative for abdominal pain, diarrhea, heartburn, nausea and vomiting.   Musculoskeletal: Negative for neck pain and neck stiffness.   Neurological: Negative for headaches.   Hematological: Negative for adenopathy.       Objective   Physical Exam   Constitutional: He appears well-developed and well-nourished. He is cooperative. He does not have a sickly appearance. He does not appear ill.   HENT:    Head: Normocephalic.   Right Ear: Hearing, tympanic membrane and external ear normal. No drainage, swelling or tenderness. No mastoid tenderness. Tympanic membrane is not injected, not scarred, not erythematous and not bulging. Tympanic membrane mobility is normal. No middle ear effusion. No decreased hearing is noted.   Left Ear: Hearing, tympanic membrane and external ear normal.   Nose: Nose normal. No mucosal edema, rhinorrhea, sinus tenderness or nasal deformity. Right sinus exhibits no maxillary sinus tenderness and no frontal sinus tenderness. Left sinus exhibits no maxillary sinus tenderness and no frontal sinus tenderness.   Mouth/Throat: Oropharynx is clear and moist and mucous membranes are normal. Normal dentition.   Eyes: Pupils are equal, round, and reactive to light. Conjunctivae and lids are normal. Right eye exhibits no discharge and no exudate. Left eye exhibits no discharge and no exudate.   Neck: Trachea normal and normal range of motion. Carotid bruit is not present. No edema present. No thyroid mass and no thyromegaly present.   Cardiovascular: Regular rhythm, normal heart sounds and normal pulses.    No murmur heard.  Pulmonary/Chest: Breath sounds normal. No respiratory distress. He has no decreased breath sounds. He has no wheezes. He has no rhonchi. He has no rales. He exhibits tenderness (with coughing only ).       Dry cough    Lymphadenopathy:        Head (right side): No submental, no submandibular, no tonsillar, no preauricular, no posterior auricular and no occipital adenopathy present.        Head (left side): No submental, no submandibular, no tonsillar, no preauricular, no posterior auricular and no occipital adenopathy present.   Neurological: He is alert.   Skin: Skin is warm, dry and intact. No cyanosis. Nails show no clubbing.       Assessment/Plan   Raymundo was seen today for cough and uri.    Diagnoses and all orders for this visit:    Cough  -     XR Chest 2 View    Rib  pain on right side  -     XR Ribs 2 View Right    Bronchitis  Comments:  continue with mucinex, drink plenty of water   Orders:  -     azithromycin (ZITHROMAX Z-SYED) 250 MG tablet; Take 2 tablets the first day, then 1 tablet daily for 4 days.      No acute findings noted. Sent for final review.

## 2019-01-09 RX ORDER — CLOPIDOGREL BISULFATE 75 MG/1
75 TABLET ORAL DAILY
Qty: 90 TABLET | Refills: 3 | Status: SHIPPED | OUTPATIENT
Start: 2019-01-09 | End: 2020-03-23 | Stop reason: SDUPTHER

## 2019-02-04 RX ORDER — METOPROLOL TARTRATE 50 MG/1
50 TABLET, FILM COATED ORAL 2 TIMES DAILY
Qty: 180 TABLET | Refills: 3 | Status: SHIPPED | OUTPATIENT
Start: 2019-02-04 | End: 2020-01-16 | Stop reason: SDUPTHER

## 2019-03-26 ENCOUNTER — PREP FOR SURGERY (OUTPATIENT)
Dept: OTHER | Facility: HOSPITAL | Age: 84
End: 2019-03-26

## 2019-03-26 DIAGNOSIS — K22.70 BARRETT'S ESOPHAGUS WITHOUT DYSPLASIA: Primary | ICD-10-CM

## 2019-03-28 ENCOUNTER — TELEPHONE (OUTPATIENT)
Dept: GASTROENTEROLOGY | Facility: CLINIC | Age: 84
End: 2019-03-28

## 2019-03-28 NOTE — TELEPHONE ENCOUNTER
Spoke with patient and informed him that Dr Vega said that since his last EGD was negative for Garcia's Esophagus he may wish to consider whether a follow up is needed(especially since he is on Plavix and this would need to be held).  Patient voiced understanding.  Patient asked if he would need a follow up EGD in the next few years and was informed not unless he starts having swallowing issues.  Informed patient that if he does develop any GI issues to call the office and schedule an office appt with Dr Vega.  Voiced understanding.

## 2019-03-28 NOTE — TELEPHONE ENCOUNTER
----- Message from Oren HARPER MD sent at 3/26/2019  4:33 PM EDT -----  Regarding: RE: RECALL PAPERWORK  Patient's last study in 2016 was negative for Garcia's.  He may wish to consider whether the follow-up is necessary, especially since he is on Plavix and aspirin and these would have to be held.  Would be happy to follow-up in office to discuss with him.  ----- Message -----  From: Jennie Crowell RN  Sent: 3/26/2019  11:34 AM  To: Oren HARPER MD  Subject: RECALL PAPERWORK                                 Open Access/Recall paperwork scanned in under the media tab.  Please review and return to Jennie.

## 2019-04-03 ENCOUNTER — OFFICE VISIT (OUTPATIENT)
Dept: INTERNAL MEDICINE | Facility: CLINIC | Age: 84
End: 2019-04-03

## 2019-04-03 VITALS
BODY MASS INDEX: 33.64 KG/M2 | HEIGHT: 70 IN | SYSTOLIC BLOOD PRESSURE: 126 MMHG | WEIGHT: 235 LBS | DIASTOLIC BLOOD PRESSURE: 76 MMHG

## 2019-04-03 DIAGNOSIS — K22.70 BARRETT'S ESOPHAGUS WITHOUT DYSPLASIA: ICD-10-CM

## 2019-04-03 DIAGNOSIS — C61 PROSTATE CANCER (HCC): Chronic | ICD-10-CM

## 2019-04-03 DIAGNOSIS — I10 ESSENTIAL HYPERTENSION: Primary | ICD-10-CM

## 2019-04-03 DIAGNOSIS — I21.9 MYOCARDIAL INFARCTION, UNSPECIFIED MI TYPE, UNSPECIFIED ARTERY (HCC): ICD-10-CM

## 2019-04-03 DIAGNOSIS — E78.01 FAMILIAL HYPERCHOLESTEROLEMIA: ICD-10-CM

## 2019-04-03 PROCEDURE — 99214 OFFICE O/P EST MOD 30 MIN: CPT | Performed by: INTERNAL MEDICINE

## 2019-04-03 RX ORDER — ZOSTER VACCINE RECOMBINANT, ADJUVANTED 50 MCG/0.5
KIT INTRAMUSCULAR
Refills: 0 | COMMUNITY
Start: 2019-02-13 | End: 2019-10-08

## 2019-04-03 NOTE — PROGRESS NOTES
Subjective   Raymundo Lopez is a 83 y.o. male. Presents with a chief complaint of [place] artery disease status post MI, hyperlipidemia, hypertension, Garcia's esophagus, and status post prostatectomy for prostate cancer.  The patient routinely follows up with his cardiologist and his gastroenterologist and his urologist and is in good health overall.  He is up-to-date on all of his health maintenance issues but he has not had his cholesterol checked recently and I am going to do a lipid profile and a CMP today.  We reviewed all of his risk profiles and he is actually doing extremely well staying active exercising and avoiding risk taking behaviors.    History of Present Illness here for a follow up    The following portions of the patient's history were reviewed and updated as appropriate: allergies, current medications, past family history, past medical history, past social history, past surgical history and problem list.    Review of Systems   Constitutional: Negative.    HENT: Negative.    Eyes: Negative.    Respiratory: Negative.    Cardiovascular: Negative.    Gastrointestinal: Negative.    Endocrine: Negative.    Genitourinary: Negative.    Musculoskeletal: Negative.    Skin: Negative.    Allergic/Immunologic: Negative.    Neurological: Negative.    Hematological: Negative.    Psychiatric/Behavioral: Negative.        Objective   Physical Exam   Constitutional: He is oriented to person, place, and time. He appears well-developed and well-nourished.   HENT:   Head: Normocephalic and atraumatic.   Eyes: EOM are normal. Pupils are equal, round, and reactive to light.   Neck: Normal range of motion.   Cardiovascular: Normal rate, regular rhythm and normal heart sounds.   Pulmonary/Chest: Effort normal and breath sounds normal.   Abdominal: Soft. Bowel sounds are normal.   Musculoskeletal: Normal range of motion.   Neurological: He is alert and oriented to person, place, and time.   Skin: Skin is warm.   Psychiatric:  He has a normal mood and affect.   Nursing note and vitals reviewed.        Assessment/Plan   Raymundo was seen today for new patient.    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

## 2019-04-11 ENCOUNTER — LAB (OUTPATIENT)
Dept: INTERNAL MEDICINE | Facility: CLINIC | Age: 84
End: 2019-04-11

## 2019-04-11 DIAGNOSIS — E78.01 FAMILIAL HYPERCHOLESTEROLEMIA: ICD-10-CM

## 2019-04-11 LAB
ALBUMIN SERPL-MCNC: 4 G/DL (ref 3.5–5.2)
ALBUMIN/GLOB SERPL: 1.1 G/DL
ALP SERPL-CCNC: 81 U/L (ref 39–117)
ALT SERPL W P-5'-P-CCNC: 20 U/L (ref 1–41)
ANION GAP SERPL CALCULATED.3IONS-SCNC: 12.2 MMOL/L
AST SERPL-CCNC: 19 U/L (ref 1–40)
BILIRUB SERPL-MCNC: 0.8 MG/DL (ref 0.2–1.2)
BUN BLD-MCNC: 19 MG/DL (ref 8–23)
BUN/CREAT SERPL: 16.8 (ref 7–25)
CALCIUM SPEC-SCNC: 9.9 MG/DL (ref 8.6–10.5)
CHLORIDE SERPL-SCNC: 101 MMOL/L (ref 98–107)
CHOLEST SERPL-MCNC: 126 MG/DL (ref 0–200)
CO2 SERPL-SCNC: 23.8 MMOL/L (ref 22–29)
CREAT BLD-MCNC: 1.13 MG/DL (ref 0.76–1.27)
GFR SERPL CREATININE-BSD FRML MDRD: 62 ML/MIN/1.73
GLOBULIN UR ELPH-MCNC: 3.5 GM/DL
GLUCOSE BLD-MCNC: 133 MG/DL (ref 65–99)
HDLC SERPL-MCNC: 60 MG/DL (ref 40–60)
LDLC SERPL CALC-MCNC: 50 MG/DL (ref 0–100)
LDLC/HDLC SERPL: 0.83 {RATIO}
POTASSIUM BLD-SCNC: 4.6 MMOL/L (ref 3.5–5.2)
PROT SERPL-MCNC: 7.5 G/DL (ref 6–8.5)
SODIUM BLD-SCNC: 137 MMOL/L (ref 136–145)
TRIGL SERPL-MCNC: 81 MG/DL (ref 0–150)
VLDLC SERPL-MCNC: 16.2 MG/DL

## 2019-04-11 PROCEDURE — 80053 COMPREHEN METABOLIC PANEL: CPT | Performed by: INTERNAL MEDICINE

## 2019-04-11 RX ORDER — NITROGLYCERIN 0.4 MG/1
0.4 TABLET SUBLINGUAL
Qty: 25 TABLET | Refills: 11 | Status: SHIPPED | OUTPATIENT
Start: 2019-04-11 | End: 2020-10-13 | Stop reason: SDUPTHER

## 2019-05-15 ENCOUNTER — OFFICE VISIT (OUTPATIENT)
Dept: CARDIOLOGY | Facility: CLINIC | Age: 84
End: 2019-05-15

## 2019-05-15 VITALS
SYSTOLIC BLOOD PRESSURE: 138 MMHG | WEIGHT: 239 LBS | DIASTOLIC BLOOD PRESSURE: 82 MMHG | HEART RATE: 63 BPM | HEIGHT: 70 IN | BODY MASS INDEX: 34.22 KG/M2

## 2019-05-15 DIAGNOSIS — I25.118 CORONARY ARTERY DISEASE OF NATIVE ARTERY OF NATIVE HEART WITH STABLE ANGINA PECTORIS (HCC): Primary | ICD-10-CM

## 2019-05-15 PROCEDURE — 93000 ELECTROCARDIOGRAM COMPLETE: CPT | Performed by: INTERNAL MEDICINE

## 2019-05-15 PROCEDURE — 99213 OFFICE O/P EST LOW 20 MIN: CPT | Performed by: INTERNAL MEDICINE

## 2019-05-15 NOTE — PROGRESS NOTES
Subjective:     Encounter Date:05/15/2019      Patient ID: Raymundo Lopez is a 84 y.o. male.    Chief Complaint: CAD    History of Present Illness    Dear Dr. Dumont:    I had the pleasure of seeing Raymundo Lopez in cardiac follow up today. As you well know he is a bhaskar 84-year-old man with history of coronary artery disease status post myocardial infarction from a diagonal branch occlusion. The remainder of his coronaries have moderate stenosis. He comes in for his follow up. Since I have last seen him he reports doing well. He has no angina, although he feels like he is slowing down. He is still [place] the [place].         Review of Systems   All other systems reviewed and are negative.        ECG 12 Lead  Date/Time: 5/15/2019 11:40 AM  Performed by: Olaf Larry MD  Authorized by: Olaf Larry MD   Comparison: compared with previous ECG   Similar to previous ECG  Rhythm: sinus rhythm  Ectopy: atrial premature contractions  BPM: 63  Other findings: early transition               Objective:     Physical Exam   Constitutional: He is oriented to person, place, and time. He appears well-developed and well-nourished.   HENT:   Head: Normocephalic and atraumatic.   Neck: Normal range of motion. Neck supple.   Cardiovascular: Normal rate, regular rhythm and normal heart sounds.   Pulmonary/Chest: Effort normal and breath sounds normal.   Abdominal: Soft. Bowel sounds are normal.   Musculoskeletal: Normal range of motion.   Neurological: He is alert and oriented to person, place, and time.   Skin: Skin is warm and dry.   Psychiatric: He has a normal mood and affect. His behavior is normal. Thought content normal.   Vitals reviewed.      Lab Review:       Assessment:          Diagnosis Plan   1. Coronary artery disease of native artery of native heart with stable angina pectoris (CMS/[facility])            Plan:       It was a pleasure to see your patient in cardiac follow up today. He is doing very well from the  cardiac standpoint without any complaints. I have recommended no changes at this time. He will see us in 1 year, sooner if symptoms warrant.    Coronary Artery Disease  Assessment  • The patient has CCS class I - angina only during strenuous or prolonged physical activity  • The patient has stable angina     Plan  • Lifestyle modifications discussed include adhering to a heart healthy diet, avoidance of tobacco products, maintenance of a healthy weight, medication compliance, regular exercise and regular monitoring of cholesterol and blood pressure    Subjective - Objective  • There is a history of past MI  • There has been a previous POBA  • Current antiplatelet therapy includes aspirin 81 mg and clopidogrel 75 mg

## 2019-05-29 RX ORDER — ATORVASTATIN CALCIUM 20 MG/1
TABLET, FILM COATED ORAL
Qty: 90 TABLET | Refills: 0 | Status: SHIPPED | OUTPATIENT
Start: 2019-05-29 | End: 2019-08-26 | Stop reason: SDUPTHER

## 2019-07-11 RX ORDER — LISINOPRIL 10 MG/1
TABLET ORAL
Qty: 180 TABLET | Refills: 3 | Status: SHIPPED | OUTPATIENT
Start: 2019-07-11 | End: 2019-10-04

## 2019-08-26 RX ORDER — ATORVASTATIN CALCIUM 20 MG/1
TABLET, FILM COATED ORAL
Qty: 90 TABLET | Refills: 2 | Status: CANCELLED | OUTPATIENT
Start: 2019-08-26

## 2019-08-26 RX ORDER — ATORVASTATIN CALCIUM 20 MG/1
20 TABLET, FILM COATED ORAL
Qty: 90 TABLET | Refills: 2 | Status: SHIPPED | OUTPATIENT
Start: 2019-08-26 | End: 2020-05-18

## 2019-10-03 ENCOUNTER — TELEPHONE (OUTPATIENT)
Dept: CARDIOLOGY | Facility: CLINIC | Age: 84
End: 2019-10-03

## 2019-10-03 NOTE — TELEPHONE ENCOUNTER
When you get a chance, would you mind addressing since Dr. Gilman is out this week? If you can't let me know and I will pass this on.    Pt called. He said he had been taking his lisinopril 10mg qd and metoprolol tart 50mg bid for awhile. He has developed a dry cough/tickle for the last few months. He was wanting to know if it could be related to his lisinopril and if so, could he switch to another medication.     His BP is running 120-130s/70-80s HR in 60-70s.  He saw Dr. Larry last on 5/15/19 and will see Dr. Gilman on 5/21/20.  Please advise.    Thanks,  Twila

## 2019-10-04 RX ORDER — LOSARTAN POTASSIUM 25 MG/1
25 TABLET ORAL DAILY
Qty: 30 TABLET | Refills: 5 | Status: SHIPPED | OUTPATIENT
Start: 2019-10-04 | End: 2020-03-20

## 2019-10-04 NOTE — TELEPHONE ENCOUNTER
Called pt and left a message advising. I sent in the losartan to his local pharmacy. I told him to call back with any questions or concerns.    Twila

## 2019-10-08 ENCOUNTER — OFFICE VISIT (OUTPATIENT)
Dept: INTERNAL MEDICINE | Facility: CLINIC | Age: 84
End: 2019-10-08

## 2019-10-08 VITALS
HEIGHT: 70 IN | WEIGHT: 238 LBS | DIASTOLIC BLOOD PRESSURE: 84 MMHG | SYSTOLIC BLOOD PRESSURE: 110 MMHG | BODY MASS INDEX: 34.07 KG/M2

## 2019-10-08 DIAGNOSIS — K22.70 BARRETT'S ESOPHAGUS WITHOUT DYSPLASIA: Chronic | ICD-10-CM

## 2019-10-08 DIAGNOSIS — E78.01 FAMILIAL HYPERCHOLESTEROLEMIA: Chronic | ICD-10-CM

## 2019-10-08 DIAGNOSIS — C61 PROSTATE CANCER (HCC): Chronic | ICD-10-CM

## 2019-10-08 DIAGNOSIS — I21.9 MYOCARDIAL INFARCTION, UNSPECIFIED MI TYPE, UNSPECIFIED ARTERY (HCC): ICD-10-CM

## 2019-10-08 DIAGNOSIS — I10 ESSENTIAL HYPERTENSION: Primary | Chronic | ICD-10-CM

## 2019-10-08 PROCEDURE — 99214 OFFICE O/P EST MOD 30 MIN: CPT | Performed by: INTERNAL MEDICINE

## 2019-10-08 NOTE — PROGRESS NOTES
Subjective   Raymundo Lopez is a 84 y.o. male. Presents with a chief complaint of HTN, Hyperlipidemia, allergic rhinitis, ace inhibitor, Garcia's Esophagus, s/p prostate cancer (doing very well), here for follow up and lab evaluation.    History of Present Illness had an ace inhibitor cough, doing better    The following portions of the patient's history were reviewed and updated as appropriate: allergies, current medications, past family history, past medical history, past social history, past surgical history and problem list.    Review of Systems   Constitutional: Positive for fatigue.   HENT: Negative.    Eyes: Negative.    Respiratory: Negative.    Cardiovascular: Negative.    Gastrointestinal: Negative.    Endocrine: Negative.    Genitourinary: Negative.    Musculoskeletal: Positive for arthralgias.   Skin: Negative.    Allergic/Immunologic: Negative.    Neurological: Negative.    Hematological: Negative.    Psychiatric/Behavioral: Negative.        Objective   Physical Exam   Constitutional: He appears well-developed and well-nourished.   HENT:   Head: Normocephalic and atraumatic.   Eyes: EOM are normal. Pupils are equal, round, and reactive to light.   Neck: Normal range of motion. Neck supple.   Cardiovascular: Normal rate, regular rhythm and normal heart sounds.   Pulmonary/Chest: Effort normal and breath sounds normal.   Abdominal: Soft. Bowel sounds are normal.   Musculoskeletal: Normal range of motion.   Neurological: He is alert.   Skin: Skin is warm.   Psychiatric: He has a normal mood and affect.   Nursing note and vitals reviewed.        Assessment/Plan   Raymundo was seen today for hypertension and hyperlipidemia.    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

## 2019-10-09 ENCOUNTER — LAB (OUTPATIENT)
Dept: INTERNAL MEDICINE | Facility: CLINIC | Age: 84
End: 2019-10-09

## 2019-10-09 DIAGNOSIS — E78.01 FAMILIAL HYPERCHOLESTEROLEMIA: Chronic | ICD-10-CM

## 2019-10-09 LAB
ALBUMIN SERPL-MCNC: 4.1 G/DL (ref 3.5–5.2)
ALBUMIN/GLOB SERPL: 1.3 G/DL
ALP SERPL-CCNC: 96 U/L (ref 39–117)
ALT SERPL W P-5'-P-CCNC: 17 U/L (ref 1–41)
ANION GAP SERPL CALCULATED.3IONS-SCNC: 13.9 MMOL/L (ref 5–15)
AST SERPL-CCNC: 15 U/L (ref 1–40)
BILIRUB SERPL-MCNC: 0.7 MG/DL (ref 0.2–1.2)
BUN BLD-MCNC: 17 MG/DL (ref 8–23)
BUN/CREAT SERPL: 14.3 (ref 7–25)
CALCIUM SPEC-SCNC: 9.3 MG/DL (ref 8.6–10.5)
CHLORIDE SERPL-SCNC: 102 MMOL/L (ref 98–107)
CHOLEST SERPL-MCNC: 125 MG/DL (ref 0–200)
CO2 SERPL-SCNC: 27.1 MMOL/L (ref 22–29)
CREAT BLD-MCNC: 1.19 MG/DL (ref 0.76–1.27)
GFR SERPL CREATININE-BSD FRML MDRD: 59 ML/MIN/1.73
GLOBULIN UR ELPH-MCNC: 3.2 GM/DL
GLUCOSE BLD-MCNC: 135 MG/DL (ref 65–99)
HDLC SERPL-MCNC: 53 MG/DL (ref 40–60)
LDLC SERPL CALC-MCNC: 53 MG/DL (ref 0–100)
LDLC/HDLC SERPL: 1 {RATIO}
POTASSIUM BLD-SCNC: 4.5 MMOL/L (ref 3.5–5.2)
PROT SERPL-MCNC: 7.3 G/DL (ref 6–8.5)
SODIUM BLD-SCNC: 143 MMOL/L (ref 136–145)
TRIGL SERPL-MCNC: 95 MG/DL (ref 0–150)
VLDLC SERPL-MCNC: 19 MG/DL (ref 5–40)

## 2019-10-09 PROCEDURE — 80053 COMPREHEN METABOLIC PANEL: CPT | Performed by: INTERNAL MEDICINE

## 2019-10-09 PROCEDURE — 36415 COLL VENOUS BLD VENIPUNCTURE: CPT | Performed by: INTERNAL MEDICINE

## 2019-10-09 PROCEDURE — 80061 LIPID PANEL: CPT | Performed by: INTERNAL MEDICINE

## 2020-01-16 RX ORDER — METOPROLOL TARTRATE 50 MG/1
50 TABLET, FILM COATED ORAL 2 TIMES DAILY
Qty: 180 TABLET | Refills: 3 | Status: SHIPPED | OUTPATIENT
Start: 2020-01-16 | End: 2021-04-22 | Stop reason: SDUPTHER

## 2020-03-20 RX ORDER — LOSARTAN POTASSIUM 25 MG/1
TABLET ORAL
Qty: 90 TABLET | Refills: 1 | Status: SHIPPED | OUTPATIENT
Start: 2020-03-20 | End: 2020-09-11 | Stop reason: SDUPTHER

## 2020-03-23 RX ORDER — CLOPIDOGREL BISULFATE 75 MG/1
75 TABLET ORAL DAILY
Qty: 90 TABLET | Refills: 0 | Status: SHIPPED | OUTPATIENT
Start: 2020-03-23 | End: 2020-06-17

## 2020-05-18 RX ORDER — ATORVASTATIN CALCIUM 20 MG/1
TABLET, FILM COATED ORAL
Qty: 90 TABLET | Refills: 0 | Status: SHIPPED | OUTPATIENT
Start: 2020-05-18 | End: 2020-07-23

## 2020-05-21 ENCOUNTER — OFFICE VISIT (OUTPATIENT)
Dept: CARDIOLOGY | Facility: CLINIC | Age: 85
End: 2020-05-21

## 2020-05-21 VITALS
DIASTOLIC BLOOD PRESSURE: 84 MMHG | HEART RATE: 55 BPM | WEIGHT: 247.8 LBS | HEIGHT: 70 IN | BODY MASS INDEX: 35.48 KG/M2 | SYSTOLIC BLOOD PRESSURE: 138 MMHG

## 2020-05-21 DIAGNOSIS — E78.2 MIXED HYPERLIPIDEMIA: ICD-10-CM

## 2020-05-21 DIAGNOSIS — I10 ESSENTIAL HYPERTENSION: ICD-10-CM

## 2020-05-21 DIAGNOSIS — I25.10 CORONARY ARTERY DISEASE INVOLVING NATIVE CORONARY ARTERY OF NATIVE HEART WITHOUT ANGINA PECTORIS: Primary | ICD-10-CM

## 2020-05-21 PROCEDURE — 93000 ELECTROCARDIOGRAM COMPLETE: CPT | Performed by: INTERNAL MEDICINE

## 2020-05-21 PROCEDURE — 99213 OFFICE O/P EST LOW 20 MIN: CPT | Performed by: INTERNAL MEDICINE

## 2020-05-21 NOTE — PROGRESS NOTES
[facility] Follow Up Office Note     Encounter Date:20  Patient:Raymundo Lopez  :1935  MRN:[id number]      Chief Complaint: Follow up on CAD    Chief Complaint   Patient presents with   • Coronary Artery Disease     1 year f/u      History of Presenting Illness:      Mr. Lopez is a 85 y.o. gentleman with past medical history notable for coronary artery disease status post balloon angioplasty of a diagonal branch in the setting of an acute myocardial infarction, hypertension, mixed hyperlipidemia, and chronic lung disease who presents to our office for scheduled follow-up.  He was last seen a year ago by Dr. Larry and is transitioning his care over to myself.  Since his last appointment he has continued to do quite well.  He denies any symptoms of coronary artery disease such as chest pain or worsening dyspnea on exertion.  He does have some baseline shortness of breath related to his chronic lung disease and wheezing.  In general he is not required significant use of his sublingual nitroglycerin and his blood pressure has been been well controlled after transitioning from lisinopril to losartan due to cough.  He continues to tolerate his atorvastatin denies any significant myalgias with excellent control of his LDL.      Review of Systems:  Review of Systems   Constitution: Negative.   HENT: Negative.    Eyes: Negative.    Cardiovascular: Negative for chest pain.   Respiratory: Positive for shortness of breath and wheezing.    Endocrine: Negative.    Hematologic/Lymphatic: Negative.    Skin: Negative.    Musculoskeletal: Negative.    Gastrointestinal: Negative.    Genitourinary: Negative.    Neurological: Negative.    Psychiatric/Behavioral: Negative.    Allergic/Immunologic: Negative.        Current Outpatient Medications on File Prior to Visit   Medication Sig Dispense Refill   • ADVAIR DISKUS 250-50 MCG/DOSE DISKUS      • aspirin 81 MG tablet Take 1 tablet by mouth 2 (two) times a day.     •  atorvastatin (LIPITOR) 20 MG tablet TAKE 1 TABLET BY MOUTH EVERYDAY AT BEDTIME 90 tablet 0   • clopidogrel (PLAVIX) 75 MG tablet Take 1 tablet by mouth Daily. 90 tablet 0   • losartan (COZAAR) 25 MG tablet TAKE 1 TABLET BY MOUTH EVERY DAY 90 tablet 1   • metoprolol tartrate (LOPRESSOR) 50 MG tablet Take 1 tablet by mouth 2 (Two) Times a Day. 180 tablet 3   • montelukast (SINGULAIR) 10 MG tablet Take 1 tablet by mouth daily.     • nitroglycerin (NITROSTAT) 0.4 MG SL tablet Place 1 tablet under the tongue Every 5 (Five) Minutes As Needed for Chest Pain. Take no more than 3 doses in 15 minutes. 25 tablet 11   • [DISCONTINUED] ipratropium-albuterol (COMBIVENT RESPIMAT)  MCG/ACT inhaler Inhale 1 puff As Needed for Wheezing.       No current facility-administered medications on file prior to visit.        No Known Allergies    Past Medical History:   Diagnosis Date   • Angina pectoris (CMS/HCC)    • Asthma    • Garcia esophagus    • CAD (coronary artery disease)    • Cancer (CMS/HCC)     prostate   • H/O electrocardiogram    • Health care maintenance    • History of EKG 06/17/2015   • Hyperlipidemia    • Hypertension    • Myocardial infarction (CMS/HCC)        Past Surgical History:   Procedure Laterality Date   • ANAL FISTULA REPAIR     • COLONOSCOPY N/A 3/29/2017    Procedure: COLONOSCOPY TO CECUM & T.I. WITH COLD POLYPECTOMIES,  HOT SNARE POLYPECTOMIES;  Surgeon: Oren HARPER MD;  Location: [facility] ENDOSCOPY;  Service:    • ENDOSCOPY N/A 11/29/2016    Procedure: ESOPHAGOGASTRODUODENOSCOPY WITH BIOPSIES;  Surgeon: Oren HARPER MD;  Location: [facility] ENDOSCOPY;  Service:    • PROSTATECTOMY         Social History     Socioeconomic History   • Marital status:      Spouse name: Not on file   • Number of children: Not on file   • Years of education: Not on file   • Highest education level: Not on file   Tobacco Use   • Smoking status: Former Smoker   • Smokeless tobacco: Never Used   Substance and  "Sexual Activity   • Alcohol use: No   • Drug use: No   • Sexual activity: Defer       History reviewed. No pertinent family history.    The following portions of the patient's history were reviewed and updated as appropriate: allergies, current medications, past family history, past medical history, past social history, past surgical history and problem list.       Objective:       Vitals:    05/21/20 1309   BP: 138/84   BP Location: Left arm   Patient Position: Sitting   Pulse: 55   Weight: 112 kg (247 lb 12.8 oz)   Height: 177.8 cm (70\")         Physical Exam:  Constitutional: Well appearing, well developed, no acute distress   HENT: Oropharynx clear and membrane moist  Eyes: Normal conjunctiva, no sclera icterus.  Neck: Supple, no carotid bruit bilaterally.  Cardiovascular: Regular rate and rhythm, no murmur, no lower extremity edema.  Pulmonary: Normal respiratory effort, no lung sounds, no wheezing.  Abdominal: Soft, nontender, no hepatosplenomegaly, liver is non-pulsatile.  Neurological: Alert and orient x 3.   Skin: Warm, dry, no ecchymosis, no rash.  Psych: Appropriate mood and affect. Normal judgment and insight.       Lab Results   Component Value Date    GLUCOSE 135 (H) 10/09/2019    BUN 17 10/09/2019    CREATININE 1.19 10/09/2019    EGFRIFNONA 59 (L) 10/09/2019    BCR 14.3 10/09/2019    K 4.5 10/09/2019    CO2 27.1 10/09/2019    CALCIUM 9.3 10/09/2019    ALBUMIN 4.10 10/09/2019    AST 15 10/09/2019    ALT 17 10/09/2019       No results found for: WBC, HGB, HCT, MCV, PLT    No results found for: CKTOTAL, CKMB, CKMBINDEX, TROPONINI, TROPONINT    No results found for: PROBNP    Lab Results   Component Value Date    CHOL 125 10/09/2019    CHLPL 126 04/11/2019    CHLPL 119 12/23/2015     Lab Results   Component Value Date    TRIG 95 10/09/2019    TRIG 81 04/11/2019    TRIG 56 12/23/2015     Lab Results   Component Value Date    HDL 53 10/09/2019    HDL 60 04/11/2019    HDL 60 12/23/2015     Lab Results "   Component Value Date    LDL 53 10/09/2019    LDL 50 04/11/2019    LDL 48 12/23/2015       No results found for: TSH        ECG 12 Lead  Date/Time: 5/21/2020 1:16 PM  Performed by: Jax Loomis MD  Authorized by: Jax Loomis MD   Comparison: compared with previous ECG from 5/15/2019  Comparison to previous ECG: APCs have resolved  Rhythm: sinus rhythm  Q waves: V1 and V2      Clinical impression: abnormal EKG               Assessment:          Diagnosis Plan   1. Coronary artery disease involving native coronary artery of native heart without angina pectoris     2. Essential hypertension     3. Mixed hyperlipidemia            Plan:       Mr. Lopez is a 85 y.o. gentleman with past medical history notable for coronary artery disease status post balloon angioplasty of a diagonal branch in the setting of an acute myocardial infarction, hypertension, mixed hyperlipidemia, and chronic lung disease who presents to our office for scheduled follow-up.  He was last seen a year ago by Dr. Larry and is transitioning his care over to myself. In general he is doing well and no changes are needed this as he has good control of his blood pressure and LDL.  No repeat testing needed at this time given lack of symptoms.  We will continue to monitor and send a repeat testing if any new symptoms develop.      Coronary artery disease without angina:  · Continue aspirin and plavix  · Continue metoprolol  · Continue statin    Hypertension:  · Well controlled    Mixed Hyperlipidemia:  · LDL well controlled  · Continue statin     Follow up:  1 year      Jax Loomis MD  [facility]  05/21/20  13:19

## 2020-06-09 ENCOUNTER — OFFICE VISIT (OUTPATIENT)
Dept: INTERNAL MEDICINE | Facility: CLINIC | Age: 85
End: 2020-06-09

## 2020-06-09 VITALS
DIASTOLIC BLOOD PRESSURE: 84 MMHG | HEART RATE: 63 BPM | SYSTOLIC BLOOD PRESSURE: 140 MMHG | HEIGHT: 70 IN | BODY MASS INDEX: 35.07 KG/M2 | WEIGHT: 245 LBS | OXYGEN SATURATION: 96 %

## 2020-06-09 DIAGNOSIS — I10 ESSENTIAL HYPERTENSION: Primary | ICD-10-CM

## 2020-06-09 DIAGNOSIS — E78.01 FAMILIAL HYPERCHOLESTEROLEMIA: ICD-10-CM

## 2020-06-09 DIAGNOSIS — C61 PROSTATE CANCER (HCC): ICD-10-CM

## 2020-06-09 DIAGNOSIS — K22.70 BARRETT'S ESOPHAGUS WITHOUT DYSPLASIA: ICD-10-CM

## 2020-06-09 DIAGNOSIS — I21.9 MYOCARDIAL INFARCTION, UNSPECIFIED MI TYPE, UNSPECIFIED ARTERY (HCC): ICD-10-CM

## 2020-06-09 PROCEDURE — 99214 OFFICE O/P EST MOD 30 MIN: CPT | Performed by: INTERNAL MEDICINE

## 2020-06-09 NOTE — PROGRESS NOTES
"Subjective   Raymundo Lopez is a 85 y.o. male.  Patient presents with a chief complaint of essential hypertension that is well controlled, hyperlipidemia, status post MI, Garcia's esophagus that gets an EGD done every year for this patient status post prostate cancer here for follow-up evaluation and treatment.  The patient stays very active and follows a healthy lifestyle at this point.      /84 (BP Location: Left arm, Patient Position: Sitting, Cuff Size: Adult)   Pulse 63   Ht 177.8 cm (70\")   Wt 111 kg (245 lb)   SpO2 96%   BMI 35.15 kg/m²     Body mass index is 35.15 kg/m².    History of Present Illness doing well in all regards no changes needed    The following portions of the patient's history were reviewed and updated as appropriate: allergies, current medications, past family history, past medical history, past social history, past surgical history and problem list.    Review of Systems   Constitutional: Negative.    HENT: Negative.    Respiratory: Negative.    Cardiovascular: Negative.    Gastrointestinal: Negative.    Musculoskeletal: Positive for arthralgias.   Neurological: Negative.    Psychiatric/Behavioral: Negative.        Objective   Physical Exam   Constitutional: He is oriented to person, place, and time. He appears well-developed and well-nourished.   HENT:   Head: Normocephalic and atraumatic.   Cardiovascular: Normal rate, regular rhythm and normal heart sounds.   Pulmonary/Chest: Effort normal and breath sounds normal.   Abdominal: Soft. Bowel sounds are normal.   Musculoskeletal: Normal range of motion.   Neurological: He is alert and oriented to person, place, and time.   Psychiatric: He has a normal mood and affect. His behavior is normal.   Nursing note and vitals reviewed.        Assessment/Plan   Raymundo was seen today for hypertension.    Diagnoses and all orders for this visit:    Essential hypertension  Comments:  Well-controlled no changes needed at this time  Orders:  -  "    Comprehensive metabolic panel; Future  -     CBC w AUTO Differential; Future  -     TSH; Future    Prostate cancer (CMS/HCC)  Comments:  PSA was 0.0    Garcia's esophagus without dysplasia  Comments:  The patient gets routine EGDs every 12 months    Familial hypercholesterolemia  Comments:  I am going to check a CMP and lipid panel  Orders:  -     Lipid Panel With LDL/HDL Ratio; Future    Myocardial infarction, unspecified MI type, unspecified artery (CMS/HCC)  Comments:  No chest pain or shortness of breath noted

## 2020-06-10 LAB
ALBUMIN SERPL-MCNC: 3.8 G/DL (ref 3.5–5.2)
ALBUMIN/GLOB SERPL: 1.2 G/DL
ALP SERPL-CCNC: 100 U/L (ref 39–117)
ALT SERPL-CCNC: 19 U/L (ref 1–41)
AST SERPL-CCNC: 15 U/L (ref 1–40)
BASOPHILS # BLD AUTO: 0.07 10*3/MM3 (ref 0–0.2)
BASOPHILS NFR BLD AUTO: 0.7 % (ref 0–1.5)
BILIRUB SERPL-MCNC: 0.4 MG/DL (ref 0.2–1.2)
BUN SERPL-MCNC: 19 MG/DL (ref 8–23)
BUN/CREAT SERPL: 15.8 (ref 7–25)
CALCIUM SERPL-MCNC: 9.5 MG/DL (ref 8.6–10.5)
CHLORIDE SERPL-SCNC: 105 MMOL/L (ref 98–107)
CHOLEST SERPL-MCNC: 122 MG/DL (ref 0–200)
CO2 SERPL-SCNC: 24.8 MMOL/L (ref 22–29)
CREAT SERPL-MCNC: 1.2 MG/DL (ref 0.76–1.27)
EOSINOPHIL # BLD AUTO: 0.48 10*3/MM3 (ref 0–0.4)
EOSINOPHIL NFR BLD AUTO: 5 % (ref 0.3–6.2)
ERYTHROCYTE [DISTWIDTH] IN BLOOD BY AUTOMATED COUNT: 13.8 % (ref 12.3–15.4)
GLOBULIN SER CALC-MCNC: 3.1 GM/DL
GLUCOSE SERPL-MCNC: 205 MG/DL (ref 65–99)
HCT VFR BLD AUTO: 40.9 % (ref 37.5–51)
HDLC SERPL-MCNC: 52 MG/DL (ref 40–60)
HGB BLD-MCNC: 13.5 G/DL (ref 13–17.7)
IMM GRANULOCYTES # BLD AUTO: 0.02 10*3/MM3 (ref 0–0.05)
IMM GRANULOCYTES NFR BLD AUTO: 0.2 % (ref 0–0.5)
LDLC SERPL CALC-MCNC: 47 MG/DL (ref 0–100)
LDLC/HDLC SERPL: 0.9 {RATIO}
LYMPHOCYTES # BLD AUTO: 1.78 10*3/MM3 (ref 0.7–3.1)
LYMPHOCYTES NFR BLD AUTO: 18.4 % (ref 19.6–45.3)
MCH RBC QN AUTO: 29.9 PG (ref 26.6–33)
MCHC RBC AUTO-ENTMCNC: 33 G/DL (ref 31.5–35.7)
MCV RBC AUTO: 90.7 FL (ref 79–97)
MONOCYTES # BLD AUTO: 0.64 10*3/MM3 (ref 0.1–0.9)
MONOCYTES NFR BLD AUTO: 6.6 % (ref 5–12)
NEUTROPHILS # BLD AUTO: 6.7 10*3/MM3 (ref 1.7–7)
NEUTROPHILS NFR BLD AUTO: 69.1 % (ref 42.7–76)
NRBC BLD AUTO-RTO: 0 /100 WBC (ref 0–0.2)
PLATELET # BLD AUTO: 232 10*3/MM3 (ref 140–450)
POTASSIUM SERPL-SCNC: 4.4 MMOL/L (ref 3.5–5.2)
PROT SERPL-MCNC: 6.9 G/DL (ref 6–8.5)
RBC # BLD AUTO: 4.51 10*6/MM3 (ref 4.14–5.8)
SODIUM SERPL-SCNC: 139 MMOL/L (ref 136–145)
TRIGL SERPL-MCNC: 116 MG/DL (ref 0–150)
TSH SERPL DL<=0.005 MIU/L-ACNC: 1.5 UIU/ML (ref 0.27–4.2)
VLDLC SERPL CALC-MCNC: 23.2 MG/DL
WBC # BLD AUTO: 9.69 10*3/MM3 (ref 3.4–10.8)

## 2020-06-17 RX ORDER — CLOPIDOGREL BISULFATE 75 MG/1
TABLET ORAL
Qty: 90 TABLET | Refills: 3 | Status: SHIPPED | OUTPATIENT
Start: 2020-06-17 | End: 2021-05-12

## 2020-07-23 RX ORDER — ATORVASTATIN CALCIUM 20 MG/1
TABLET, FILM COATED ORAL
Qty: 90 TABLET | Refills: 0 | Status: SHIPPED | OUTPATIENT
Start: 2020-07-23 | End: 2020-10-23

## 2020-09-11 RX ORDER — LOSARTAN POTASSIUM 25 MG/1
25 TABLET ORAL DAILY
Qty: 90 TABLET | Refills: 3 | Status: SHIPPED | OUTPATIENT
Start: 2020-09-11 | End: 2021-08-16

## 2020-10-13 RX ORDER — NITROGLYCERIN 0.4 MG/1
0.4 TABLET SUBLINGUAL
Qty: 25 TABLET | Refills: 3 | Status: SHIPPED | OUTPATIENT
Start: 2020-10-13

## 2020-10-23 RX ORDER — ATORVASTATIN CALCIUM 20 MG/1
TABLET, FILM COATED ORAL
Qty: 90 TABLET | Refills: 3 | Status: SHIPPED | OUTPATIENT
Start: 2020-10-23 | End: 2021-10-04

## 2020-12-10 ENCOUNTER — OFFICE VISIT (OUTPATIENT)
Dept: INTERNAL MEDICINE | Facility: CLINIC | Age: 85
End: 2020-12-10

## 2020-12-10 VITALS
BODY MASS INDEX: 35.36 KG/M2 | HEART RATE: 71 BPM | HEIGHT: 70 IN | SYSTOLIC BLOOD PRESSURE: 124 MMHG | WEIGHT: 247 LBS | DIASTOLIC BLOOD PRESSURE: 82 MMHG | OXYGEN SATURATION: 96 % | TEMPERATURE: 98.2 F

## 2020-12-10 DIAGNOSIS — E78.2 MIXED HYPERLIPIDEMIA: ICD-10-CM

## 2020-12-10 DIAGNOSIS — C61 PROSTATE CANCER (HCC): ICD-10-CM

## 2020-12-10 DIAGNOSIS — I25.10 CORONARY ARTERY DISEASE INVOLVING NATIVE CORONARY ARTERY OF NATIVE HEART WITHOUT ANGINA PECTORIS: ICD-10-CM

## 2020-12-10 DIAGNOSIS — I21.9 MYOCARDIAL INFARCTION, UNSPECIFIED MI TYPE, UNSPECIFIED ARTERY (HCC): ICD-10-CM

## 2020-12-10 DIAGNOSIS — I10 ESSENTIAL HYPERTENSION: Primary | ICD-10-CM

## 2020-12-10 DIAGNOSIS — K22.70 BARRETT'S ESOPHAGUS WITHOUT DYSPLASIA: ICD-10-CM

## 2020-12-10 LAB
ALBUMIN SERPL-MCNC: 3.7 G/DL (ref 3.5–5.2)
ALBUMIN/GLOB SERPL: 1.2 G/DL
ALP SERPL-CCNC: 109 U/L (ref 39–117)
ALT SERPL-CCNC: 20 U/L (ref 1–41)
AST SERPL-CCNC: 18 U/L (ref 1–40)
BILIRUB SERPL-MCNC: 0.4 MG/DL (ref 0–1.2)
BUN SERPL-MCNC: 24 MG/DL (ref 8–23)
BUN/CREAT SERPL: 20.7 (ref 7–25)
CALCIUM SERPL-MCNC: 9.3 MG/DL (ref 8.6–10.5)
CHLORIDE SERPL-SCNC: 102 MMOL/L (ref 98–107)
CHOLEST SERPL-MCNC: 128 MG/DL (ref 0–200)
CO2 SERPL-SCNC: 26.9 MMOL/L (ref 22–29)
CREAT SERPL-MCNC: 1.16 MG/DL (ref 0.76–1.27)
GLOBULIN SER CALC-MCNC: 3 GM/DL
GLUCOSE SERPL-MCNC: 147 MG/DL (ref 65–99)
HDLC SERPL-MCNC: 55 MG/DL (ref 40–60)
LDLC SERPL CALC-MCNC: 43 MG/DL (ref 0–100)
LDLC/HDLC SERPL: 0.65 {RATIO}
POTASSIUM SERPL-SCNC: 4.6 MMOL/L (ref 3.5–5.2)
PROT SERPL-MCNC: 6.7 G/DL (ref 6–8.5)
SODIUM SERPL-SCNC: 138 MMOL/L (ref 136–145)
TRIGL SERPL-MCNC: 185 MG/DL (ref 0–150)
VLDLC SERPL CALC-MCNC: 30 MG/DL (ref 5–40)

## 2020-12-10 PROCEDURE — 99214 OFFICE O/P EST MOD 30 MIN: CPT | Performed by: INTERNAL MEDICINE

## 2020-12-10 NOTE — PROGRESS NOTES
"Subjective   Raymundo Lopez is a 85 y.o. male.  Patient presents with chief complaint of essential hypertension, hyperlipidemia, coronary artery disease stable, prostate cancer with prostatectomy, Garcia's esophagus without dysplasia, and status post MI here for follow-up evaluation and treatment.  He has no new complaints he is doing well overall he is up-to-date on all of his vaccinations and he has routine follow-up with his cardiologist once a year.      /82 (BP Location: Left arm, Patient Position: Sitting, Cuff Size: Adult)   Pulse 71   Temp 98.2 °F (36.8 °C) (Oral)   Ht 177.8 cm (70\")   Wt 112 kg (247 lb)   SpO2 96%   BMI 35.44 kg/m²     Body mass index is 35.44 kg/m².    History of Present Illness doing well overall no new complaints    The following portions of the patient's history were reviewed and updated as appropriate: allergies, current medications, past family history, past medical history, past social history, past surgical history and problem list.    Review of Systems   Constitutional: Negative.    HENT: Negative.    Respiratory: Negative.    Cardiovascular: Negative.    Gastrointestinal: Negative.    Musculoskeletal: Negative.    Neurological: Negative.    Psychiatric/Behavioral: Negative.        Objective   Physical Exam  Vitals signs and nursing note reviewed.   Constitutional:       Appearance: Normal appearance. He is obese.   HENT:      Head: Normocephalic and atraumatic.   Cardiovascular:      Rate and Rhythm: Normal rate and regular rhythm.      Heart sounds: Normal heart sounds.   Pulmonary:      Effort: Pulmonary effort is normal.      Breath sounds: Normal breath sounds.   Abdominal:      General: Abdomen is flat. Bowel sounds are normal.      Palpations: Abdomen is soft.   Musculoskeletal: Normal range of motion.   Skin:     General: Skin is warm.   Neurological:      General: No focal deficit present.      Mental Status: He is alert and oriented to person, place, and time. "   Psychiatric:         Mood and Affect: Mood normal.         Behavior: Behavior normal.           Assessment/Plan   Diagnoses and all orders for this visit:    1. Essential hypertension (Primary)  Comments:  Well-controlled no changes at this time    2. Mixed hyperlipidemia  Comments:  I am going to check a CMP and lipid panel today  Orders:  -     Comprehensive metabolic panel; Future  -     Lipid Panel With LDL/HDL Ratio; Future    3. Coronary artery disease involving native coronary artery of native heart without angina pectoris  Comments:  No chest pain or shortness of breath doing well    4. Prostate cancer (CMS/HCC)  Comments:  Status post prostatectomy without any evidence of recurrence of disease    5. Garcia's esophagus without dysplasia  Comments:  EGD every 2 years    6. Myocardial infarction, unspecified MI type, unspecified artery (CMS/HCC)  Comments:  No evidence of residual cardiac compromise whatsoever

## 2021-03-02 DIAGNOSIS — Z23 IMMUNIZATION DUE: ICD-10-CM

## 2021-04-22 ENCOUNTER — OFFICE VISIT (OUTPATIENT)
Dept: CARDIOLOGY | Facility: CLINIC | Age: 86
End: 2021-04-22

## 2021-04-22 VITALS
HEIGHT: 70 IN | BODY MASS INDEX: 34.65 KG/M2 | WEIGHT: 242 LBS | HEART RATE: 106 BPM | DIASTOLIC BLOOD PRESSURE: 80 MMHG | SYSTOLIC BLOOD PRESSURE: 112 MMHG

## 2021-04-22 DIAGNOSIS — I10 ESSENTIAL HYPERTENSION: ICD-10-CM

## 2021-04-22 DIAGNOSIS — I25.10 CORONARY ARTERY DISEASE INVOLVING NATIVE CORONARY ARTERY OF NATIVE HEART WITHOUT ANGINA PECTORIS: Primary | ICD-10-CM

## 2021-04-22 DIAGNOSIS — E78.2 MIXED HYPERLIPIDEMIA: ICD-10-CM

## 2021-04-22 PROCEDURE — 93000 ELECTROCARDIOGRAM COMPLETE: CPT | Performed by: INTERNAL MEDICINE

## 2021-04-22 PROCEDURE — 99214 OFFICE O/P EST MOD 30 MIN: CPT | Performed by: INTERNAL MEDICINE

## 2021-04-22 RX ORDER — METOPROLOL TARTRATE 100 MG/1
100 TABLET ORAL 2 TIMES DAILY
Qty: 180 TABLET | Refills: 3 | Status: SHIPPED | OUTPATIENT
Start: 2021-04-22 | End: 2022-04-12

## 2021-04-22 NOTE — PROGRESS NOTES
[facility] Follow Up Office Note     Encounter Date:21  Patient:Raymundo Lopez  :1935  MRN:[id number]      Chief Complaint: Follow up on CAD    Chief Complaint   Patient presents with   • Rapid Heart Rate     History of Presenting Illness:      Mr. Lopez is a 85 y.o. gentleman with past medical history notable for coronary artery disease status post balloon angioplasty of a diagonal branch in the setting of an acute myocardial infarction, hypertension, mixed hyperlipidemia, and chronic lung disease who presents to our office for new chest pain and tachycardia.  He was last in approximate year ago and at that time was doing very well.  He had no issues at that time.  In general he is doing quite well up until 3 days ago.  He had noticed that really after eating dinner the last 2 nights that he was having chest pain which last for a few minutes and then resolved.  Pain was nonradiating but he would have some left arm numbness with this.  He had another episode this time at lunch today which prompted him to call our office and be seen on a more urgent basis.  He was originally was to be seen next month.  Along with with this he has noticed new tachycardia.  Really does not have any exertional symptoms and he states the symptoms are somewhat different than where his chest pain was when he had coronary disease in the past.  Is not required any sublingual nitroglycerin.        Review of Systems:  Review of Systems   Constitutional: Negative.   HENT: Negative.    Eyes: Negative.    Cardiovascular: Positive for chest pain.   Respiratory: Positive for shortness of breath and wheezing.    Endocrine: Negative.    Hematologic/Lymphatic: Negative.    Skin: Negative.    Musculoskeletal: Negative.    Gastrointestinal: Negative.    Genitourinary: Negative.    Neurological: Negative.    Psychiatric/Behavioral: Negative.    Allergic/Immunologic: Negative.        Current Outpatient Medications on File Prior to  Visit   Medication Sig Dispense Refill   • ADVAIR DISKUS 250-50 MCG/DOSE DISKUS      • aspirin 81 MG tablet Take 1 tablet by mouth 2 (two) times a day.     • atorvastatin (LIPITOR) 20 MG tablet TAKE 1 TABLET BY MOUTH EVERYDAY AT BEDTIME 90 tablet 3   • clopidogrel (PLAVIX) 75 MG tablet TAKE 1 TABLET BY MOUTH EVERY DAY 90 tablet 3   • losartan (COZAAR) 25 MG tablet Take 1 tablet by mouth Daily. 90 tablet 3   • montelukast (SINGULAIR) 10 MG tablet Take 1 tablet by mouth daily.     • nitroglycerin (Nitrostat) 0.4 MG SL tablet Place 1 tablet under the tongue Every 5 (Five) Minutes As Needed for Chest Pain. Take no more than 3 doses in 15 minutes. 25 tablet 3   • [DISCONTINUED] metoprolol tartrate (LOPRESSOR) 50 MG tablet Take 1 tablet by mouth 2 (Two) Times a Day. 180 tablet 3     No current facility-administered medications on file prior to visit.       No Known Allergies    Past Medical History:   Diagnosis Date   • Angina pectoris (CMS/HCC)    • Asthma    • Garcia esophagus    • CAD (coronary artery disease)    • Cancer (CMS/HCC)     prostate   • H/O electrocardiogram    • Health care maintenance    • History of EKG 06/17/2015   • Hyperlipidemia    • Hypertension    • Myocardial infarction (CMS/HCC)        Past Surgical History:   Procedure Laterality Date   • ANAL FISTULA REPAIR     • COLONOSCOPY N/A 3/29/2017    Procedure: COLONOSCOPY TO CECUM & T.I. WITH COLD POLYPECTOMIES,  HOT SNARE POLYPECTOMIES;  Surgeon: Oren HARPER MD;  Location: [facility] ENDOSCOPY;  Service:    • ENDOSCOPY N/A 11/29/2016    Procedure: ESOPHAGOGASTRODUODENOSCOPY WITH BIOPSIES;  Surgeon: Oren HARPER MD;  Location: [facility] ENDOSCOPY;  Service:    • PROSTATECTOMY         Social History     Socioeconomic History   • Marital status:      Spouse name: Not on file   • Number of children: Not on file   • Years of education: Not on file   • Highest education level: Not on file   Tobacco Use   • Smoking status: Former Smoker   •  "Smokeless tobacco: Never Used   Substance and Sexual Activity   • Alcohol use: No   • Drug use: No   • Sexual activity: Defer       History reviewed. No pertinent family history.    The following portions of the patient's history were reviewed and updated as appropriate: allergies, current medications, past family history, past medical history, past social history, past surgical history and problem list.       Objective:       Vitals:    04/22/21 1457   BP: 112/80   BP Location: Left arm   Patient Position: Sitting   Pulse: 106   Weight: 110 kg (242 lb)   Height: 177.8 cm (70\")     Body mass index is 34.72 kg/m².     Physical Exam:  Constitutional: Well appearing, well developed, no acute distress   HENT: Oropharynx clear and membrane moist  Eyes: Normal conjunctiva, no sclera icterus.  Neck: Supple, no carotid bruit bilaterally.  Cardiovascular: Regular rate and rhythm, no murmur, no lower extremity edema.  Pulmonary: Normal respiratory effort, no lung sounds, no wheezing.  Abdominal: Soft, nontender, no hepatosplenomegaly, liver is non-pulsatile.  Neurological: Alert and orient x 3.   Skin: Warm, dry, no ecchymosis, no rash.  Psych: Appropriate mood and affect. Normal judgment and insight.       Lab Results   Component Value Date    GLUCOSE 135 (H) 10/09/2019    BUN 24 (H) 12/10/2020    CREATININE 1.16 12/10/2020    EGFRIFNONA 60 (L) 12/10/2020    EGFRIFAFRI 73 12/10/2020    BCR 20.7 12/10/2020    K 4.6 12/10/2020    CO2 26.9 12/10/2020    CALCIUM 9.3 12/10/2020    PROTENTOTREF 6.7 12/10/2020    ALBUMIN 3.70 12/10/2020    LABIL2 1.2 12/10/2020    AST 18 12/10/2020    ALT 20 12/10/2020       Lab Results   Component Value Date    WBC 9.69 06/09/2020    HGB 13.5 06/09/2020    HCT 40.9 06/09/2020    MCV 90.7 06/09/2020     06/09/2020       No results found for: CKTOTAL, CKMB, CKMBINDEX, TROPONINI, TROPONINT    No results found for: PROBNP    Lab Results   Component Value Date    CHOL 125 10/09/2019    CHLPL 128 " 12/10/2020    CHLPL 122 06/09/2020    CHLPL 126 04/11/2019     Lab Results   Component Value Date    TRIG 185 (H) 12/10/2020    TRIG 116 06/09/2020    TRIG 95 10/09/2019     Lab Results   Component Value Date    HDL 55 12/10/2020    HDL 52 06/09/2020    HDL 53 10/09/2019     Lab Results   Component Value Date    LDL 43 12/10/2020    LDL 47 06/09/2020    LDL 53 10/09/2019       Lab Results   Component Value Date    TSH 1.500 06/09/2020           ECG 12 Lead    Date/Time: 4/22/2021 3:55 PM  Performed by: Jax Loomis MD  Authorized by: Jax Loomis MD   Comparison: compared with previous ECG from 5/21/2020  Comparison to previous ECG: Significant heart rate increase  Rhythm: sinus tachycardia    Clinical impression: non-specific ECG               Assessment:          Diagnosis Plan   1. Coronary artery disease involving native coronary artery of native heart without angina pectoris  Stress Test With Myocardial Perfusion One Day    ECG 12 Lead   2. Essential hypertension     3. Mixed hyperlipidemia            Plan:       Mr. Lopez is a 85 y.o. gentleman with past medical history notable for coronary artery disease status post balloon angioplasty of a diagonal branch in the setting of an acute myocardial infarction, hypertension, mixed hyperlipidemia, and chronic lung disease who presents to our office for scheduled follow-up.  General I am concerned that he had a recent change in his symptoms.  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  I have ordered a stress test and have increased his metoprolol from 50 mg up to 100 mg twice daily.  I explained to him that he has any worsening of his symptoms or if his symptoms are not improving we may need to consider cardiac  catheterization but at this point is not clearly anginal related like to further evaluate with a stress test first before proceeding forward with any invasive testing.    Coronary artery disease with angina:  · Continue aspirin and plavix  · Continue metoprolol we will increase from 50 mg up to 100 mg  · Continue statin    Hypertension:  · Well controlled    Mixed Hyperlipidemia:  · LDL well controlled  · Continue statin     Follow up:  6 weeks      Jax Loomis MD  [facility]  04/22/21  15:56 EDT

## 2021-04-30 ENCOUNTER — HOSPITAL ENCOUNTER (OUTPATIENT)
Dept: CARDIOLOGY | Facility: HOSPITAL | Age: 86
Discharge: HOME | End: 2021-04-30
Admitting: INTERNAL MEDICINE

## 2021-04-30 ENCOUNTER — TELEPHONE (OUTPATIENT)
Dept: CARDIOLOGY | Facility: CLINIC | Age: 86
End: 2021-04-30

## 2021-04-30 DIAGNOSIS — I25.10 CORONARY ARTERY DISEASE INVOLVING NATIVE CORONARY ARTERY OF NATIVE HEART WITHOUT ANGINA PECTORIS: ICD-10-CM

## 2021-04-30 LAB
BH CV NUCLEAR PRIOR STUDY: 3
BH CV REST NUCLEAR ISOTOPE DOSE: 49 MCI
BH CV STRESS BP STAGE 1: NORMAL
BH CV STRESS COMMENTS STAGE 1: NORMAL
BH CV STRESS DOSE REGADENOSON STAGE 1: 0.4
BH CV STRESS DURATION MIN STAGE 1: 0
BH CV STRESS DURATION SEC STAGE 1: 10
BH CV STRESS HR STAGE 1: 68
BH CV STRESS NUCLEAR ISOTOPE DOSE: 49 MCI
BH CV STRESS PROTOCOL 1: NORMAL
BH CV STRESS RECOVERY BP: NORMAL MMHG
BH CV STRESS RECOVERY HR: 63 BPM
BH CV STRESS STAGE 1: 1
LV EF NUC BP: 82 %
MAXIMAL PREDICTED HEART RATE: 137 BPM
PERCENT MAX PREDICTED HR: 49.64 %
STRESS BASELINE BP: NORMAL MMHG
STRESS BASELINE HR: 56 BPM
STRESS PERCENT HR: 58 %
STRESS POST EXERCISE DUR SEC: 10 SEC
STRESS POST PEAK BP: NORMAL MMHG
STRESS POST PEAK HR: 68 BPM
STRESS TARGET HR: 116 BPM

## 2021-04-30 PROCEDURE — 93017 CV STRESS TEST TRACING ONLY: CPT

## 2021-04-30 PROCEDURE — A9555 RB82 RUBIDIUM: HCPCS | Performed by: INTERNAL MEDICINE

## 2021-04-30 PROCEDURE — 25010000002 REGADENOSON 0.4 MG/5ML SOLUTION: Performed by: INTERNAL MEDICINE

## 2021-04-30 PROCEDURE — 93018 CV STRESS TEST I&R ONLY: CPT | Performed by: INTERNAL MEDICINE

## 2021-04-30 PROCEDURE — 78492 MYOCRD IMG PET MLT RST&STRS: CPT | Performed by: INTERNAL MEDICINE

## 2021-04-30 PROCEDURE — 0 RUBIDIUM CHLORIDE: Performed by: INTERNAL MEDICINE

## 2021-04-30 PROCEDURE — 78492 MYOCRD IMG PET MLT RST&STRS: CPT

## 2021-04-30 PROCEDURE — 93016 CV STRESS TEST SUPVJ ONLY: CPT | Performed by: INTERNAL MEDICINE

## 2021-04-30 RX ADMIN — REGADENOSON 0.4 MG: 0.08 INJECTION, SOLUTION INTRAVENOUS at 12:45

## 2021-04-30 NOTE — TELEPHONE ENCOUNTER
Stress test reviewed with Dr. Hermosillo in Dr. Loomis's absence.  The stress test is mildly abnormal.  Recommending medical management and close follow up which has already scheduled in June.  Metoprolol has already been adjusted.  Will also discuss with Dr. Loomis when he returns.

## 2021-05-03 ENCOUNTER — TELEPHONE (OUTPATIENT)
Dept: CARDIOLOGY | Facility: CLINIC | Age: 86
End: 2021-05-03

## 2021-05-03 NOTE — TELEPHONE ENCOUNTER
Called patient and discussed stress test results.  There is a fair amount of GI artifact and potentially a small area of ischemia in the inferior wall.  Fortunately since increasing his metoprolol patient is clinically doing much better and not having any more chest pain.  I would like to continue to monitor him closely clinically.  If he does well on his current medical regimen I would continue with this and hold off on repeat cardiac catheterization.  However if he has any more recurrent issues that time we might consider a repeat heart catheterization then.  We do have close follow-up in a month to see how he is doing but he was told to call us sooner if he is having any recurrent issues.   Immunizations - Hep B from hospital only    Pain level today:   0

## 2021-05-12 RX ORDER — CLOPIDOGREL BISULFATE 75 MG/1
TABLET ORAL
Qty: 90 TABLET | Refills: 3 | Status: SHIPPED | OUTPATIENT
Start: 2021-05-12 | End: 2022-04-27

## 2021-06-03 ENCOUNTER — OFFICE VISIT (OUTPATIENT)
Dept: CARDIOLOGY | Facility: CLINIC | Age: 86
End: 2021-06-03

## 2021-06-03 VITALS
SYSTOLIC BLOOD PRESSURE: 144 MMHG | DIASTOLIC BLOOD PRESSURE: 74 MMHG | HEIGHT: 70 IN | WEIGHT: 241 LBS | BODY MASS INDEX: 34.5 KG/M2 | HEART RATE: 58 BPM

## 2021-06-03 DIAGNOSIS — I25.10 CORONARY ARTERY DISEASE INVOLVING NATIVE CORONARY ARTERY OF NATIVE HEART WITHOUT ANGINA PECTORIS: Primary | ICD-10-CM

## 2021-06-03 DIAGNOSIS — I10 ESSENTIAL HYPERTENSION: ICD-10-CM

## 2021-06-03 DIAGNOSIS — E78.01 FAMILIAL HYPERCHOLESTEROLEMIA: ICD-10-CM

## 2021-06-03 PROCEDURE — 99214 OFFICE O/P EST MOD 30 MIN: CPT | Performed by: INTERNAL MEDICINE

## 2021-06-03 PROCEDURE — 93000 ELECTROCARDIOGRAM COMPLETE: CPT | Performed by: INTERNAL MEDICINE

## 2021-06-03 NOTE — PROGRESS NOTES
[facility] Follow Up Office Note     Encounter Date:21  Patient:Raymundo Lopez  :1935  MRN:[id number]      Chief Complaint: Follow up on CAD    Chief Complaint   Patient presents with   • Coronary Artery Disease     6 week f/u   • Hypertension   • Hyperlipidemia     History of Presenting Illness:      Mr. Lopez is a 86 y.o. gentleman with past medical history notable for coronary artery disease status post balloon angioplasty of a diagonal branch in the setting of an acute myocardial infarction, hypertension, mixed hyperlipidemia, and chronic lung disease who presents to our office for scheduled follow up.  He was last seen approximately 6 weeks ago with new onset chest discomfort.  We had concerns based upon the description of his chest pain but did note that he was fairly tachycardic and we uptitrated his beta-blocker and obtained a noninvasive stress test.  His noninvasive stress test was low risk although there was a significant amount of GI uptake which confounded assessment of the inferior wall.  Nonetheless patient is actually feeling much better on his higher dose metoprolol.  Overall he is doing better.  He is not had any further episodes of chest discomfort.      Review of Systems:  Review of Systems   Constitutional: Negative.   HENT: Negative.    Eyes: Negative.    Cardiovascular: Negative.    Respiratory: Positive for shortness of breath and wheezing.    Endocrine: Negative.    Hematologic/Lymphatic: Negative.    Skin: Negative.    Musculoskeletal: Negative.    Gastrointestinal: Negative.    Genitourinary: Negative.    Neurological: Negative.    Psychiatric/Behavioral: Negative.    Allergic/Immunologic: Negative.        Current Outpatient Medications on File Prior to Visit   Medication Sig Dispense Refill   • aspirin 81 MG tablet Take 1 tablet by mouth 2 (two) times a day.     • atorvastatin (LIPITOR) 20 MG tablet TAKE 1 TABLET BY MOUTH EVERYDAY AT BEDTIME 90 tablet 3   •  clopidogrel (PLAVIX) 75 MG tablet TAKE 1 TABLET BY MOUTH EVERY DAY 90 tablet 3   • losartan (COZAAR) 25 MG tablet Take 1 tablet by mouth Daily. 90 tablet 3   • metoprolol tartrate (LOPRESSOR) 100 MG tablet Take 1 tablet by mouth 2 (Two) Times a Day. 180 tablet 3   • montelukast (SINGULAIR) 10 MG tablet Take 1 tablet by mouth daily.     • nitroglycerin (Nitrostat) 0.4 MG SL tablet Place 1 tablet under the tongue Every 5 (Five) Minutes As Needed for Chest Pain. Take no more than 3 doses in 15 minutes. 25 tablet 3   • Trelegy Ellipta 100-62.5-25 MCG/INH inhaler      • [DISCONTINUED] ADVAIR DISKUS 250-50 MCG/DOSE DISKUS        No current facility-administered medications on file prior to visit.       No Known Allergies    Past Medical History:   Diagnosis Date   • Angina pectoris (CMS/HCC)    • Asthma    • Garcia esophagus    • CAD (coronary artery disease)    • Cancer (CMS/HCC)     prostate   • H/O electrocardiogram    • Health care maintenance    • History of EKG 06/17/2015   • Hyperlipidemia    • Hypertension    • Myocardial infarction (CMS/HCC)        Past Surgical History:   Procedure Laterality Date   • ANAL FISTULA REPAIR     • COLONOSCOPY N/A 3/29/2017    Procedure: COLONOSCOPY TO CECUM & T.I. WITH COLD POLYPECTOMIES,  HOT SNARE POLYPECTOMIES;  Surgeon: Oren HARPER MD;  Location: [facility] ENDOSCOPY;  Service:    • ENDOSCOPY N/A 11/29/2016    Procedure: ESOPHAGOGASTRODUODENOSCOPY WITH BIOPSIES;  Surgeon: Oren HARPER MD;  Location: [facility] ENDOSCOPY;  Service:    • PROSTATECTOMY         Social History     Socioeconomic History   • Marital status:      Spouse name: Not on file   • Number of children: Not on file   • Years of education: Not on file   • Highest education level: Not on file   Tobacco Use   • Smoking status: Former Smoker   • Smokeless tobacco: Never Used   Substance and Sexual Activity   • Alcohol use: No   • Drug use: No   • Sexual activity: Defer       History reviewed. No  "pertinent family history.    The following portions of the patient's history were reviewed and updated as appropriate: allergies, current medications, past family history, past medical history, past social history, past surgical history and problem list.       Objective:       Vitals:    06/03/21 1239   BP: 144/74   BP Location: Left arm   Patient Position: Sitting   Pulse: 58   Weight: 109 kg (241 lb)   Height: 177.8 cm (70\")     Body mass index is 34.58 kg/m².     Physical Exam:  Constitutional: Well appearing, well developed, no acute distress   HENT: Oropharynx clear and membrane moist  Eyes: Normal conjunctiva, no sclera icterus.  Neck: Supple, no carotid bruit bilaterally.  Cardiovascular: Regular rate and rhythm, no murmur, no lower extremity edema.  Pulmonary: Normal respiratory effort, no lung sounds, no wheezing.  Abdominal: Soft, nontender, no hepatosplenomegaly, liver is non-pulsatile.  Neurological: Alert and orient x 3.   Skin: Warm, dry, no ecchymosis, no rash.  Psych: Appropriate mood and affect. Normal judgment and insight.       Lab Results   Component Value Date    GLUCOSE 135 (H) 10/09/2019    BUN 24 (H) 12/10/2020    CREATININE 1.16 12/10/2020    EGFRIFNONA 60 (L) 12/10/2020    EGFRIFAFRI 73 12/10/2020    BCR 20.7 12/10/2020    K 4.6 12/10/2020    CO2 26.9 12/10/2020    CALCIUM 9.3 12/10/2020    PROTENTOTREF 6.7 12/10/2020    ALBUMIN 3.70 12/10/2020    LABIL2 1.2 12/10/2020    AST 18 12/10/2020    ALT 20 12/10/2020       Lab Results   Component Value Date    WBC 9.69 06/09/2020    HGB 13.5 06/09/2020    HCT 40.9 06/09/2020    MCV 90.7 06/09/2020     06/09/2020       No results found for: CKTOTAL, CKMB, CKMBINDEX, TROPONINI, TROPONINT    No results found for: PROBNP    Lab Results   Component Value Date    CHOL 125 10/09/2019    CHLPL 128 12/10/2020    CHLPL 122 06/09/2020    CHLPL 126 04/11/2019     Lab Results   Component Value Date    TRIG 185 (H) 12/10/2020    TRIG 116 06/09/2020    " TRIG 95 10/09/2019     Lab Results   Component Value Date    HDL 55 12/10/2020    HDL 52 06/09/2020    HDL 53 10/09/2019     Lab Results   Component Value Date    LDL 43 12/10/2020    LDL 47 06/09/2020    LDL 53 10/09/2019       Lab Results   Component Value Date    TSH 1.500 06/09/2020           ECG 12 Lead    Date/Time: 6/3/2021 12:59 PM  Performed by: Jax Loomis MD  Authorized by: Jax Loomis MD   Comparison: compared with previous ECG from 4/22/2021  Comparison to previous ECG: APC are new  Rhythm: sinus rhythm  Ectopy: atrial premature contractions    Clinical impression: abnormal EKG        Stress MPI 4/30/2021:  · There is a very small, medium intensity reversible defect in the distal inferior wall that may represent ischemia. The study was technically difficult due to profound intense subdiaphragmatic GI uptake of the radiotracer.  · Left ventricular ejection fraction is hyperdynamic (Calculated EF > 70%). .       Assessment:          Diagnosis Plan   1. Coronary artery disease involving native coronary artery of native heart without angina pectoris  ECG 12 Lead   2. Essential hypertension     3. Familial hypercholesterolemia            Plan:       Mr. Lopez is a 86 y.o. gentleman with past medical history notable for coronary artery disease status post balloon angioplasty of a diagonal branch in the setting of an acute myocardial infarction, hypertension, mixed hyperlipidemia, and chronic lung disease who presents to our office for scheduled follow-up.  Overall patient is doing better on higher dose metoprolol.  His heart rate is better controlled.  His symptoms are also minimal and improving.  His stress test is low risk but was somewhat confounded by diaphragmatic uptake.  Clinically he is doing better and will focus on his clinical response.  If any changes in this would consider repeat cardiac testing at that time but for the time being we will continue with current medical  therapy.    Coronary artery disease with angina:  · Continue aspirin and plavix  · Continue metoprolol 100 mg  · Continue statin    Hypertension:  · Well controlled    Mixed Hyperlipidemia:  · Continue statin   · Lipid panel 12/2020 demonstrates normal well controlled LDL and Total Cholesterol  · CMP 12/2020 normal ALT and AST    Follow up:  6 months      Jax Loomis MD  [facility]  06/03/21  13:01 EDT

## 2021-06-14 NOTE — PROGRESS NOTES
"Chief Complaint  Establish Care    Subjective          Raymundo Lopez presents to [facility]  History of Present Illness     Prior PCP Tim    Hypertension - stable.  Patient taking medication as prescribed.  Denies chest pain, shortness of breath, headache, lower extremity edema.  Patient is taking losartan, metoprolol.    HLD-stable.  Patient taking atorvastatin as prescribed.  Trying to adhere to a low-fat diet.    Garcia's esophagus - takes omeprazole 20mg and is stable on that medication.    Takes Trelegy from Dr. Gao from [facility].    Several glucose values over 126 in the past but no A1cs in the chart.  Will need to check.    Objective   Vital Signs:   /82 (BP Location: Left arm, Patient Position: Sitting, Cuff Size: Adult)   Pulse 54   Temp 98.2 °F (36.8 °C) (Temporal)   Resp 16   Ht 177.8 cm (70\")   Wt 109 kg (240 lb 3.2 oz)   SpO2 95%   BMI 34.47 kg/m²     Physical Exam  Vitals and nursing note reviewed.   Constitutional:       General: He is not in acute distress.     Appearance: Normal appearance.   Cardiovascular:      Rate and Rhythm: Normal rate and regular rhythm.      Heart sounds: Normal heart sounds. No murmur heard.     Pulmonary:      Effort: Pulmonary effort is normal.      Breath sounds: Normal breath sounds.   Neurological:      Mental Status: He is alert.        Result Review :   The following data was reviewed by: Oren Sahni MD on 06/15/2021:  Common labs    Common Labsle 12/10/20 12/10/20    1346 1346   Glucose  147 (A)   BUN  24 (A)   Creatinine  1.16   eGFR Non  Am  60 (A)   eGFR African Am  73   Sodium  138   Potassium  4.6   Chloride  102   Calcium  9.3   Total Protein  6.7   Albumin  3.70   Total Bilirubin  0.4   Alkaline Phosphatase  109   AST (SGOT)  18   ALT (SGPT)  20   Total Cholesterol 128    Triglycerides 185 (A)    HDL Cholesterol 55    LDL Cholesterol  43    (A) Abnormal value       Comments " are available for some flowsheets but are not being displayed.                     Assessment and Plan    Diagnoses and all orders for this visit:    1. Encounter to establish care with new doctor (Primary)  -     Hemoglobin A1c    2. Essential hypertension    3. Mixed hyperlipidemia    4. Coronary artery disease involving native coronary artery of native heart without angina pectoris    5. 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 Impaired fasting glucose  -     Hemoglobin A1c      Check A1c today due to several sugars over 126 in the past but no A1c.  From chart review and patient examination, hypertension, hyperlipidemia, coronary artery disease, Garcia's esophagus all appear stable and he should continue the medications as above.  I will let him know if I am concerned about his blood sugar after the A1c returns.  I would not be surprised if he has a 6.5-6.8 A1c.        Follow Up   Return in about 6 months (around 12/15/2021) for Recheck - HTN, HLD, Garcia's, IFG.  Patient was given instructions and counseling regarding his condition or for health maintenance advice. Please see specific information pulled into the AVS if appropriate.

## 2021-06-15 ENCOUNTER — OFFICE VISIT (OUTPATIENT)
Dept: INTERNAL MEDICINE | Facility: CLINIC | Age: 86
End: 2021-06-15

## 2021-06-15 VITALS
RESPIRATION RATE: 16 BRPM | BODY MASS INDEX: 34.39 KG/M2 | SYSTOLIC BLOOD PRESSURE: 130 MMHG | OXYGEN SATURATION: 95 % | TEMPERATURE: 98.2 F | DIASTOLIC BLOOD PRESSURE: 82 MMHG | HEART RATE: 54 BPM | HEIGHT: 70 IN | WEIGHT: 240.2 LBS

## 2021-06-15 DIAGNOSIS — E78.2 MIXED HYPERLIPIDEMIA: ICD-10-CM

## 2021-06-15 DIAGNOSIS — R73.01 IMPAIRED FASTING GLUCOSE: ICD-10-CM

## 2021-06-15 DIAGNOSIS — K22.70 BARRETT'S ESOPHAGUS WITHOUT DYSPLASIA: ICD-10-CM

## 2021-06-15 DIAGNOSIS — Z76.89 ENCOUNTER TO ESTABLISH CARE WITH NEW DOCTOR: Primary | ICD-10-CM

## 2021-06-15 DIAGNOSIS — I25.10 CORONARY ARTERY DISEASE INVOLVING NATIVE CORONARY ARTERY OF NATIVE HEART WITHOUT ANGINA PECTORIS: ICD-10-CM

## 2021-06-15 DIAGNOSIS — I10 ESSENTIAL HYPERTENSION: ICD-10-CM

## 2021-06-15 PROBLEM — I21.9 MYOCARDIAL INFARCTION: Chronic | Status: ACTIVE | Noted: 2019-04-03

## 2021-06-15 PROBLEM — J45.40 MODERATE PERSISTENT ASTHMA WITHOUT COMPLICATION: Chronic | Status: ACTIVE | Noted: 2021-06-15

## 2021-06-15 PROBLEM — E78.01 FAMILIAL HYPERCHOLESTEROLEMIA: Chronic | Status: ACTIVE | Noted: 2019-04-03

## 2021-06-15 LAB — HBA1C MFR BLD: 7.4 % (ref 4.8–5.6)

## 2021-06-15 PROCEDURE — 99214 OFFICE O/P EST MOD 30 MIN: CPT | Performed by: FAMILY MEDICINE

## 2021-06-15 RX ORDER — OMEPRAZOLE 20 MG/1
20 CAPSULE, DELAYED RELEASE ORAL DAILY
COMMUNITY

## 2021-06-15 NOTE — PATIENT INSTRUCTIONS
Prediabetes Eating Plan  Prediabetes is a condition that causes blood sugar (glucose) levels to be higher than normal. This increases the risk for developing diabetes. In order to prevent diabetes from developing, your health care provider may recommend a diet and other lifestyle changes to help you:  · Control your blood glucose levels.  · Improve your cholesterol levels.  · Manage your blood pressure.  Your health care provider may recommend working with a diet and nutrition specialist (dietitian) to make a meal plan that is best for you.  What are tips for following this plan?  Lifestyle  · Set weight loss goals with the help of your health care team. It is recommended that most people with prediabetes lose 7% of their current body weight.  · Exercise for at least 30 minutes at least 5 days a week.  · Attend a support group or seek ongoing support from a mental health counselor.  · Take over-the-counter and prescription medicines only as told by your health care provider.  Reading food labels  · Read food labels to check the amount of fat, salt (sodium), and sugar in prepackaged foods. Avoid foods that have:  ? Saturated fats.  ? Trans fats.  ? Added sugars.  · Avoid foods that have more than 300 milligrams (mg) of sodium per serving. Limit your daily sodium intake to less than 2,300 mg each day.  Shopping  · Avoid buying pre-made and processed foods.  Cooking  · Cook with olive oil. Do not use butter, lard, or ghee.  · Bake, broil, grill, or boil foods. Avoid frying.  Meal planning    · Work with your dietitian to develop an eating plan that is right for you. This may include:  ? Tracking how many calories you take in. Use a food diary, notebook, or mobile application to track what you eat at each meal.  ? Using the glycemic index (GI) to plan your meals. The index tells you how quickly a food will raise your blood glucose. Choose low-GI foods. These foods take a longer time to raise blood glucose.  · Consider  following a Mediterranean diet. This diet includes:  ? Several servings each day of fresh fruits and vegetables.  ? Eating fish at least twice a week.  ? Several servings each day of whole grains, beans, nuts, and seeds.  ? Using olive oil instead of other fats.  ? Moderate alcohol consumption.  ? Eating small amounts of red meat and whole-fat dairy.  · If you have high blood pressure, you may need to limit your sodium intake or follow a diet such as the DASH eating plan. DASH is an eating plan that aims to lower high blood pressure.  What foods are recommended?  The items listed below may not be a complete list. Talk with your dietitian about what dietary choices are best for you.  Grains  Whole grains, such as whole-wheat or whole-grain breads, crackers, cereals, and pasta. Unsweetened oatmeal. Bulgur. Barley. Quinoa. Brown rice. Corn or whole-wheat flour tortillas or taco shells.  Vegetables  Lettuce. Spinach. Peas. Beets. Cauliflower. Cabbage. Broccoli. Carrots. Tomatoes. Squash. Eggplant. Herbs. Peppers. Onions. Cucumbers. [place] sprouts.  Fruits  Berries. Bananas. Apples. Oranges. Grapes. Papaya. [place]. Pomegranate. Kiwi. Grapefruit. Cherries.  Meats and other protein foods  Seafood. Poultry without skin. Lean cuts of pork and beef. Tofu. Eggs. Nuts. Beans.  Dairy  Low-fat or fat-free dairy products, such as yogurt, cottage cheese, and cheese.  Beverages  Water. Tea. Coffee. Sugar-free or diet soda. [place] water. Lowfat or no-fat milk. Milk alternatives, such as soy or almond milk.  Fats and oils  Olive oil. Canola oil. Sunflower oil. Grapeseed oil. Avocado. Walnuts.  Sweets and desserts  Sugar-free or low-fat pudding. Sugar-free or low-fat ice cream and other frozen treats.  Seasoning and other foods  Herbs. Sodium-free spices. Mustard. Relish. Low-fat, low-sugar ketchup. Low-fat, low-sugar barbecue sauce. Low-fat or fat-free mayonnaise.  What foods are not recommended?  The items listed below may not be a  complete list. Talk with your dietitian about what dietary choices are best for you.  Grains  Refined white flour and flour products, such as bread, pasta, snack foods, and cereals.  Vegetables  Canned vegetables. Frozen vegetables with butter or cream sauce.  Fruits  Fruits canned with syrup.  Meats and other protein foods  Fatty cuts of meat. Poultry with skin. Breaded or fried meat. Processed meats.  Dairy  Full-fat yogurt, cheese, or milk.  Beverages  Sweetened drinks, such as sweet iced tea and soda.  Fats and oils  Butter. Lard. Ghee.  Sweets and desserts  Baked goods, such as cake, cupcakes, pastries, cookies, and cheesecake.  Seasoning and other foods  Spice mixes with added salt. Ketchup. Barbecue sauce. Mayonnaise.  Summary  · To prevent diabetes from developing, you may need to make diet and other lifestyle changes to help control blood sugar, improve cholesterol levels, and manage your blood pressure.  · Set weight loss goals with the help of your health care team. It is recommended that most people with prediabetes lose 7 percent of their current body weight.  · Consider following a Mediterranean diet that includes plenty of fresh fruits and vegetables, whole grains, beans, nuts, seeds, fish, lean meat, low-fat dairy, and healthy oils.  This information is not intended to replace advice given to you by your health care provider. Make sure you discuss any questions you have with your health care provider.  Document Revised: 04/10/2020 Document Reviewed: 02/21/2018  [employer] Patient Education © 2021 [employer]

## 2021-06-17 PROBLEM — E11.9 TYPE 2 DIABETES MELLITUS WITHOUT COMPLICATION, WITHOUT LONG-TERM CURRENT USE OF INSULIN (HCC): Chronic | Status: ACTIVE | Noted: 2021-06-17

## 2021-06-27 NOTE — PROGRESS NOTES
"Chief Complaint  Diabetes (new diagnosis )    Subjective          Raymundo Lopez presents to [facility]  History of Present Illness     Newly diagnosed diabetic here today for his first visit regarding his diabetes.  His A1c came back at 7.40 which is uncontrolled.  He is here today to discuss getting on medications and counseling regarding diabetes.    Objective   Vital Signs:   /80 (BP Location: Left arm, Patient Position: Sitting, Cuff Size: Adult)   Pulse 71   Temp 98.4 °F (36.9 °C)   Resp 16   Ht 177.8 cm (70\")   Wt 109 kg (240 lb)   SpO2 95%   BMI 34.44 kg/m²     Physical Exam  Vitals and nursing note reviewed.   Constitutional:       General: He is not in acute distress.     Appearance: Normal appearance.   Cardiovascular:      Rate and Rhythm: Normal rate and regular rhythm.      Heart sounds: Normal heart sounds. No murmur heard.     Pulmonary:      Effort: Pulmonary effort is normal.      Breath sounds: Normal breath sounds.   Neurological:      Mental Status: He is alert.        Result Review :   The following data was reviewed by: Oren Sahni MD on 06/29/2021:  Common labs    Common Labsle 12/10/20 12/10/20 6/15/21    1346 1346    Glucose  147 (A)    BUN  24 (A)    Creatinine  1.16    eGFR Non  Am  60 (A)    eGFR African Am  73    Sodium  138    Potassium  4.6    Chloride  102    Calcium  9.3    Total Protein  6.7    Albumin  3.70    Total Bilirubin  0.4    Alkaline Phosphatase  109    AST (SGOT)  18    ALT (SGPT)  20    Total Cholesterol 128     Triglycerides 185 (A)     HDL Cholesterol 55     LDL Cholesterol  43     Hemoglobin A1C   7.40 (A)   (A) Abnormal value       Comments are available for some flowsheets but are not being displayed.                     Assessment and Plan    Diagnoses and all orders for this visit:    1. Type 2 diabetes mellitus without complication, without long-term current use of insulin (CMS/[facility]) (Primary)  -     " Microalbumin / Creatinine Urine Ratio - Urine, Clean Catch  -     metFORMIN (Glucophage) 500 MG tablet; Take 1 tablet by mouth Daily With Breakfast.  Dispense: 90 tablet; Refill: 1      Recommend starting metformin.  Provided diabetic counseling.  Will also get microalbumin today as well.  See back in 3 months.      Follow Up   Return in about 3 months (around 9/29/2021) for Recheck - diabetes .  Patient was given instructions and counseling regarding his condition or for health maintenance advice. Please see specific information pulled into the AVS if appropriate.

## 2021-06-29 ENCOUNTER — OFFICE VISIT (OUTPATIENT)
Dept: INTERNAL MEDICINE | Facility: CLINIC | Age: 86
End: 2021-06-29

## 2021-06-29 VITALS
RESPIRATION RATE: 16 BRPM | SYSTOLIC BLOOD PRESSURE: 118 MMHG | HEIGHT: 70 IN | DIASTOLIC BLOOD PRESSURE: 80 MMHG | BODY MASS INDEX: 34.36 KG/M2 | WEIGHT: 240 LBS | OXYGEN SATURATION: 95 % | TEMPERATURE: 98.4 F | HEART RATE: 71 BPM

## 2021-06-29 DIAGNOSIS — E11.9 TYPE 2 DIABETES MELLITUS WITHOUT COMPLICATION, WITHOUT LONG-TERM CURRENT USE OF INSULIN (HCC): Primary | ICD-10-CM

## 2021-06-29 PROCEDURE — 99214 OFFICE O/P EST MOD 30 MIN: CPT | Performed by: FAMILY MEDICINE

## 2021-06-29 NOTE — PATIENT INSTRUCTIONS
Diabetes Mellitus and Nutrition, Adult  When you have diabetes, or diabetes mellitus, it is very important to have healthy eating habits because your blood sugar (glucose) levels are greatly affected by what you eat and drink. Eating healthy foods in the right amounts, at about the same times every day, can help you:  · Control your blood glucose.  · Lower your risk of heart disease.  · Improve your blood pressure.  · Reach or maintain a healthy weight.  What can affect my meal plan?  Every person with diabetes is different, and each person has different needs for a meal plan. Your health care provider may recommend that you work with a dietitian to make a meal plan that is best for you. Your meal plan may vary depending on factors such as:  · The calories you need.  · The medicines you take.  · Your weight.  · Your blood glucose, blood pressure, and cholesterol levels.  · Your activity level.  · Other health conditions you have, such as heart or kidney disease.  How do carbohydrates affect me?  Carbohydrates, also called carbs, affect your blood glucose level more than any other type of food. Eating carbs naturally raises the amount of glucose in your blood. Carb counting is a method for keeping track of how many carbs you eat. Counting carbs is important to keep your blood glucose at a healthy level, especially if you use insulin or take certain oral diabetes medicines.  It is important to know how many carbs you can safely have in each meal. This is different for every person. Your dietitian can help you calculate how many carbs you should have at each meal and for each snack.  How does alcohol affect me?  Alcohol can cause a sudden decrease in blood glucose (hypoglycemia), especially if you use insulin or take certain oral diabetes medicines. Hypoglycemia can be a life-threatening condition. Symptoms of hypoglycemia, such as sleepiness, dizziness, and confusion, are similar to symptoms of having too much  "alcohol.  · Do not drink alcohol if:  ? Your health care provider tells you not to drink.  ? You are pregnant, may be pregnant, or are planning to become pregnant.  · If you drink alcohol:  ? Do not drink on an empty stomach.  ? Limit how much you use to:  § 0-1 drink a day for women.  § 0-2 drinks a day for men.  ? Be aware of how much alcohol is in your drink. In the [place] one drink equals one 12 oz bottle of beer (355 mL), one 5 oz glass of wine (148 mL), or one 1½ oz glass of hard liquor (44 mL).  ? Keep yourself hydrated with water, diet soda, or unsweetened iced tea.  § Keep in mind that regular soda, juice, and other mixers may contain a lot of sugar and must be counted as carbs.  What are tips for following this plan?    Reading food labels  · Start by checking the serving size on the \"Nutrition Facts\" label of packaged foods and drinks. The amount of calories, carbs, fats, and other nutrients listed on the label is based on one serving of the item. Many items contain more than one serving per package.  · Check the total grams (g) of carbs in one serving. You can calculate the number of servings of carbs in one serving by dividing the total carbs by 15. For example, if a food has 30 g of total carbs per serving, it would be equal to 2 servings of carbs.  · Check the number of grams (g) of saturated fats and trans fats in one serving. Choose foods that have a low amount or none of these fats.  · Check the number of milligrams (mg) of salt (sodium) in one serving. Most people should limit total sodium intake to less than 2,300 mg per day.  · Always check the nutrition information of foods labeled as \"low-fat\" or \"nonfat.\" These foods may be higher in added sugar or refined carbs and should be avoided.  · Talk to your dietitian to identify your daily goals for nutrients listed on the label.  Shopping  · Avoid buying canned, pre-made, or processed foods. These foods tend to be high in fat, sodium, and added " sugar.  · Shop around the outside edge of the grocery store. This is where you will most often find fresh fruits and vegetables, bulk grains, fresh meats, and fresh dairy.  Cooking  · Use low-heat cooking methods, such as baking, instead of high-heat cooking methods like deep frying.  · Cook using healthy oils, such as olive, canola, or sunflower oil.  · Avoid cooking with butter, cream, or high-fat meats.  Meal planning  · Eat meals and snacks regularly, preferably at the same times every day. Avoid going long periods of time without eating.  · Eat foods that are high in fiber, such as fresh fruits, vegetables, beans, and whole grains. Talk with your dietitian about how many servings of carbs you can eat at each meal.  · Eat 4-6 oz (112-168 g) of lean protein each day, such as lean meat, chicken, fish, eggs, or tofu. One ounce (oz) of lean protein is equal to:  ? 1 oz (28 g) of meat, chicken, or fish.  ? 1 egg.  ? ¼ cup (62 g) of tofu.  · Eat some foods each day that contain healthy fats, such as avocado, nuts, seeds, and fish.  What foods should I eat?  Fruits  Berries. Apples. Oranges. Peaches. Apricots. Plums. Grapes. Papo. Papaya. Pomegranate. Kiwi. Cherries.  Vegetables  Lettuce. Spinach. Leafy greens, including kale, chard, courtney greens, and mustard greens. Beets. Cauliflower. Cabbage. Broccoli. Carrots. Green beans. Tomatoes. Peppers. Onions. Cucumbers. [place] sprouts.  Grains  Whole grains, such as whole-wheat or whole-grain bread, crackers, tortillas, cereal, and pasta. Unsweetened oatmeal. Quinoa. Brown or wild rice.  Meats and other proteins  Seafood. Poultry without skin. Lean cuts of poultry and beef. Tofu. Nuts. Seeds.  Dairy  Low-fat or fat-free dairy products such as milk, yogurt, and cheese.  The items listed above may not be a complete list of foods and beverages you can eat. Contact a dietitian for more information.  What foods should I avoid?  Fruits  Fruits canned with  syrup.  Vegetables  Canned vegetables. Frozen vegetables with butter or cream sauce.  Grains  Refined white flour and flour products such as bread, pasta, snack foods, and cereals. Avoid all processed foods.  Meats and other proteins  Fatty cuts of meat. Poultry with skin. Breaded or fried meats. Processed meat. Avoid saturated fats.  Dairy  Full-fat yogurt, cheese, or milk.  Beverages  Sweetened drinks, such as soda or iced tea.  The items listed above may not be a complete list of foods and beverages you should avoid. Contact a dietitian for more information.  Questions to ask a health care provider  · Do I need to meet with a diabetes educator?  · Do I need to meet with a dietitian?  · What number can I call if I have questions?  · When are the best times to check my blood glucose?  Where to find more information:  · American Diabetes Association: diabetes.org  · Academy of Nutrition and Dietetics: www.eatright.org  · National Ivor of Diabetes and Digestive and Kidney Diseases: www.niddk.nih.gov  · Association of Diabetes Care and Education Specialists: www.diabeteseducator.org  Summary  · It is important to have healthy eating habits because your blood sugar (glucose) levels are greatly affected by what you eat and drink.  · A healthy meal plan will help you control your blood glucose and maintain a healthy lifestyle.  · Your health care provider may recommend that you work with a dietitian to make a meal plan that is best for you.  · Keep in mind that carbohydrates (carbs) and alcohol have immediate effects on your blood glucose levels. It is important to count carbs and to use alcohol carefully.  This information is not intended to replace advice given to you by your health care provider. Make sure you discuss any questions you have with your health care provider.  Document Revised: 11/24/2020 Document Reviewed: 11/24/2020  Elsevier Patient Education © 2021 [employer] Inc.

## 2021-06-30 LAB
ALBUMIN/CREAT UR: 12 MG/G CREAT (ref 0–29)
CREAT UR-MCNC: 147.4 MG/DL
MICROALBUMIN UR-MCNC: 17.2 UG/ML

## 2021-08-16 RX ORDER — LOSARTAN POTASSIUM 25 MG/1
TABLET ORAL
Qty: 90 TABLET | Refills: 3 | Status: SHIPPED | OUTPATIENT
Start: 2021-08-16 | End: 2022-05-18

## 2021-10-01 DIAGNOSIS — E11.9 TYPE 2 DIABETES MELLITUS WITHOUT COMPLICATION, WITHOUT LONG-TERM CURRENT USE OF INSULIN (HCC): ICD-10-CM

## 2021-10-04 RX ORDER — ATORVASTATIN CALCIUM 20 MG/1
TABLET, FILM COATED ORAL
Qty: 90 TABLET | Refills: 3 | Status: SHIPPED | OUTPATIENT
Start: 2021-10-04 | End: 2022-10-13

## 2021-10-05 ENCOUNTER — OFFICE VISIT (OUTPATIENT)
Dept: INTERNAL MEDICINE | Facility: CLINIC | Age: 86
End: 2021-10-05

## 2021-10-05 VITALS
HEART RATE: 56 BPM | DIASTOLIC BLOOD PRESSURE: 88 MMHG | WEIGHT: 238.6 LBS | OXYGEN SATURATION: 96 % | HEIGHT: 70 IN | BODY MASS INDEX: 34.16 KG/M2 | TEMPERATURE: 97.7 F | SYSTOLIC BLOOD PRESSURE: 140 MMHG

## 2021-10-05 DIAGNOSIS — E11.65 TYPE 2 DIABETES MELLITUS WITH HYPERGLYCEMIA, WITHOUT LONG-TERM CURRENT USE OF INSULIN (HCC): Primary | ICD-10-CM

## 2021-10-05 PROCEDURE — 99213 OFFICE O/P EST LOW 20 MIN: CPT | Performed by: FAMILY MEDICINE

## 2021-10-05 NOTE — PROGRESS NOTES
"Chief Complaint  Follow-up (3 month follow up ) and Diabetes    Subjective          Raymundo Lopez presents to [facility]  History of Present Illness     Diabetes mellitus-needs labs to check stability.  No new numbness, tingling.  Patient is taking Metformin 500 mg daily as prescribed.  No side effects.  Last A1c was 7.40.        Objective   Vital Signs:   /88 (BP Location: Left arm, Patient Position: Sitting, Cuff Size: Adult)   Pulse 56   Temp 97.7 °F (36.5 °C) (Temporal)   Ht 177.8 cm (70\")   Wt 108 kg (238 lb 9.6 oz)   SpO2 96%   BMI 34.24 kg/m²     Physical Exam  Vitals and nursing note reviewed.   Constitutional:       General: He is not in acute distress.     Appearance: Normal appearance.   Cardiovascular:      Rate and Rhythm: Normal rate and regular rhythm.      Heart sounds: Normal heart sounds. No murmur heard.     Pulmonary:      Effort: Pulmonary effort is normal.      Breath sounds: Normal breath sounds.   Neurological:      Mental Status: He is alert.        Result Review :     Common labs    Common Labsle 12/10/20 12/10/20 6/15/21 6/29/21    1346 1346     Glucose  147 (A)     BUN  24 (A)     Creatinine  1.16     eGFR Non  Am  60 (A)     eGFR African Am  73     Sodium  138     Potassium  4.6     Chloride  102     Calcium  9.3     Total Protein  6.7     Albumin  3.70     Total Bilirubin  0.4     Alkaline Phosphatase  109     AST (SGOT)  18     ALT (SGPT)  20     Total Cholesterol 128      Triglycerides 185 (A)      HDL Cholesterol 55      LDL Cholesterol  43      Hemoglobin A1C   7.40 (A)    Microalbumin, Urine    17.2   (A) Abnormal value       Comments are available for some flowsheets but are not being displayed.                     Assessment and Plan    Diagnoses and all orders for this visit:    1. Type 2 diabetes mellitus with hyperglycemia, without long-term current use of insulin (HCC) (Primary)  -     Hemoglobin A1c  -     Comprehensive " metabolic panel      Will check stability of the diabetes with A1c and CMP.  Continue Metformin 500 mg daily      Follow Up   Return in about 6 months (around 4/5/2022) for Recheck.  Patient was given instructions and counseling regarding his condition or for health maintenance advice. Please see specific information pulled into the AVS if appropriate.

## 2021-10-05 NOTE — PATIENT INSTRUCTIONS
Diabetes Mellitus and Nutrition, Adult  When you have diabetes, or diabetes mellitus, it is very important to have healthy eating habits because your blood sugar (glucose) levels are greatly affected by what you eat and drink. Eating healthy foods in the right amounts, at about the same times every day, can help you:  · Control your blood glucose.  · Lower your risk of heart disease.  · Improve your blood pressure.  · Reach or maintain a healthy weight.  What can affect my meal plan?  Every person with diabetes is different, and each person has different needs for a meal plan. Your health care provider may recommend that you work with a dietitian to make a meal plan that is best for you. Your meal plan may vary depending on factors such as:  · The calories you need.  · The medicines you take.  · Your weight.  · Your blood glucose, blood pressure, and cholesterol levels.  · Your activity level.  · Other health conditions you have, such as heart or kidney disease.  How do carbohydrates affect me?  Carbohydrates, also called carbs, affect your blood glucose level more than any other type of food. Eating carbs naturally raises the amount of glucose in your blood. Carb counting is a method for keeping track of how many carbs you eat. Counting carbs is important to keep your blood glucose at a healthy level, especially if you use insulin or take certain oral diabetes medicines.  It is important to know how many carbs you can safely have in each meal. This is different for every person. Your dietitian can help you calculate how many carbs you should have at each meal and for each snack.  How does alcohol affect me?  Alcohol can cause a sudden decrease in blood glucose (hypoglycemia), especially if you use insulin or take certain oral diabetes medicines. Hypoglycemia can be a life-threatening condition. Symptoms of hypoglycemia, such as sleepiness, dizziness, and confusion, are similar to symptoms of having too much  "alcohol.  · Do not drink alcohol if:  ? Your health care provider tells you not to drink.  ? You are pregnant, may be pregnant, or are planning to become pregnant.  · If you drink alcohol:  ? Do not drink on an empty stomach.  ? Limit how much you use to:  § 0-1 drink a day for women.  § 0-2 drinks a day for men.  ? Be aware of how much alcohol is in your drink. In the [place] one drink equals one 12 oz bottle of beer (355 mL), one 5 oz glass of wine (148 mL), or one 1½ oz glass of hard liquor (44 mL).  ? Keep yourself hydrated with water, diet soda, or unsweetened iced tea.  § Keep in mind that regular soda, juice, and other mixers may contain a lot of sugar and must be counted as carbs.  What are tips for following this plan?    Reading food labels  · Start by checking the serving size on the \"Nutrition Facts\" label of packaged foods and drinks. The amount of calories, carbs, fats, and other nutrients listed on the label is based on one serving of the item. Many items contain more than one serving per package.  · Check the total grams (g) of carbs in one serving. You can calculate the number of servings of carbs in one serving by dividing the total carbs by 15. For example, if a food has 30 g of total carbs per serving, it would be equal to 2 servings of carbs.  · Check the number of grams (g) of saturated fats and trans fats in one serving. Choose foods that have a low amount or none of these fats.  · Check the number of milligrams (mg) of salt (sodium) in one serving. Most people should limit total sodium intake to less than 2,300 mg per day.  · Always check the nutrition information of foods labeled as \"low-fat\" or \"nonfat.\" These foods may be higher in added sugar or refined carbs and should be avoided.  · Talk to your dietitian to identify your daily goals for nutrients listed on the label.  Shopping  · Avoid buying canned, pre-made, or processed foods. These foods tend to be high in fat, sodium, and added " sugar.  · Shop around the outside edge of the grocery store. This is where you will most often find fresh fruits and vegetables, bulk grains, fresh meats, and fresh dairy.  Cooking  · Use low-heat cooking methods, such as baking, instead of high-heat cooking methods like deep frying.  · Cook using healthy oils, such as olive, canola, or sunflower oil.  · Avoid cooking with butter, cream, or high-fat meats.  Meal planning  · Eat meals and snacks regularly, preferably at the same times every day. Avoid going long periods of time without eating.  · Eat foods that are high in fiber, such as fresh fruits, vegetables, beans, and whole grains. Talk with your dietitian about how many servings of carbs you can eat at each meal.  · Eat 4-6 oz (112-168 g) of lean protein each day, such as lean meat, chicken, fish, eggs, or tofu. One ounce (oz) of lean protein is equal to:  ? 1 oz (28 g) of meat, chicken, or fish.  ? 1 egg.  ? ¼ cup (62 g) of tofu.  · Eat some foods each day that contain healthy fats, such as avocado, nuts, seeds, and fish.  What foods should I eat?  Fruits  Berries. Apples. Oranges. Peaches. Apricots. Plums. Grapes. Papo. Papaya. Pomegranate. Kiwi. Cherries.  Vegetables  Lettuce. Spinach. Leafy greens, including kale, chard, courtney greens, and mustard greens. Beets. Cauliflower. Cabbage. Broccoli. Carrots. Green beans. Tomatoes. Peppers. Onions. Cucumbers. [place] sprouts.  Grains  Whole grains, such as whole-wheat or whole-grain bread, crackers, tortillas, cereal, and pasta. Unsweetened oatmeal. Quinoa. Brown or wild rice.  Meats and other proteins  Seafood. Poultry without skin. Lean cuts of poultry and beef. Tofu. Nuts. Seeds.  Dairy  Low-fat or fat-free dairy products such as milk, yogurt, and cheese.  The items listed above may not be a complete list of foods and beverages you can eat. Contact a dietitian for more information.  What foods should I avoid?  Fruits  Fruits canned with  syrup.  Vegetables  Canned vegetables. Frozen vegetables with butter or cream sauce.  Grains  Refined white flour and flour products such as bread, pasta, snack foods, and cereals. Avoid all processed foods.  Meats and other proteins  Fatty cuts of meat. Poultry with skin. Breaded or fried meats. Processed meat. Avoid saturated fats.  Dairy  Full-fat yogurt, cheese, or milk.  Beverages  Sweetened drinks, such as soda or iced tea.  The items listed above may not be a complete list of foods and beverages you should avoid. Contact a dietitian for more information.  Questions to ask a health care provider  · Do I need to meet with a diabetes educator?  · Do I need to meet with a dietitian?  · What number can I call if I have questions?  · When are the best times to check my blood glucose?  Where to find more information:  · American Diabetes Association: diabetes.org  · Academy of Nutrition and Dietetics: www.eatright.org  · National Raphine of Diabetes and Digestive and Kidney Diseases: www.niddk.nih.gov  · Association of Diabetes Care and Education Specialists: www.diabeteseducator.org  Summary  · It is important to have healthy eating habits because your blood sugar (glucose) levels are greatly affected by what you eat and drink.  · A healthy meal plan will help you control your blood glucose and maintain a healthy lifestyle.  · Your health care provider may recommend that you work with a dietitian to make a meal plan that is best for you.  · Keep in mind that carbohydrates (carbs) and alcohol have immediate effects on your blood glucose levels. It is important to count carbs and to use alcohol carefully.  This information is not intended to replace advice given to you by your health care provider. Make sure you discuss any questions you have with your health care provider.  Document Revised: 11/24/2020 Document Reviewed: 11/24/2020  Elsevier Patient Education © 2021 [employer] Inc.

## 2021-10-06 DIAGNOSIS — E11.9 TYPE 2 DIABETES MELLITUS WITHOUT COMPLICATION, WITHOUT LONG-TERM CURRENT USE OF INSULIN (HCC): ICD-10-CM

## 2021-10-06 LAB
ALBUMIN SERPL-MCNC: 4.3 G/DL (ref 3.5–5.2)
ALBUMIN/GLOB SERPL: 1.9 G/DL
ALP SERPL-CCNC: 102 U/L (ref 39–117)
ALT SERPL-CCNC: 17 U/L (ref 1–41)
AST SERPL-CCNC: 20 U/L (ref 1–40)
BILIRUB SERPL-MCNC: 0.3 MG/DL (ref 0–1.2)
BUN SERPL-MCNC: 20 MG/DL (ref 8–23)
BUN/CREAT SERPL: 21.3 (ref 7–25)
CALCIUM SERPL-MCNC: 9.4 MG/DL (ref 8.6–10.5)
CHLORIDE SERPL-SCNC: 105 MMOL/L (ref 98–107)
CO2 SERPL-SCNC: 26.7 MMOL/L (ref 22–29)
CREAT SERPL-MCNC: 0.94 MG/DL (ref 0.76–1.27)
GLOBULIN SER CALC-MCNC: 2.3 GM/DL
GLUCOSE SERPL-MCNC: 112 MG/DL (ref 65–99)
HBA1C MFR BLD: 7.2 % (ref 4.8–5.6)
POTASSIUM SERPL-SCNC: 5 MMOL/L (ref 3.5–5.2)
PROT SERPL-MCNC: 6.6 G/DL (ref 6–8.5)
SODIUM SERPL-SCNC: 142 MMOL/L (ref 136–145)

## 2021-11-02 ENCOUNTER — TELEPHONE (OUTPATIENT)
Dept: INTERNAL MEDICINE | Facility: CLINIC | Age: 86
End: 2021-11-02

## 2021-11-02 NOTE — TELEPHONE ENCOUNTER
Caller: Raymundo Lopez    Relationship: Self    Best call back number: [phone]    What is the best time to reach you: ANYTIME    Who are you requesting to speak with (clinical staff, provider,  specific staff member): MA OR DOCTOR     What was the call regarding: PATIENT STATES HE HAS QUESTIONS ABOUT METFORMIN AND THE INSTRUCTIONS ARE DIFFERENT ON HIS NEW REFILL    Do you require a callback:

## 2021-12-07 ENCOUNTER — OFFICE VISIT (OUTPATIENT)
Dept: CARDIOLOGY | Facility: CLINIC | Age: 86
End: 2021-12-07

## 2021-12-07 VITALS
HEART RATE: 61 BPM | SYSTOLIC BLOOD PRESSURE: 128 MMHG | WEIGHT: 239.6 LBS | BODY MASS INDEX: 34.3 KG/M2 | OXYGEN SATURATION: 98 % | DIASTOLIC BLOOD PRESSURE: 86 MMHG | HEIGHT: 70 IN

## 2021-12-07 DIAGNOSIS — E78.01 FAMILIAL HYPERCHOLESTEROLEMIA: Chronic | ICD-10-CM

## 2021-12-07 DIAGNOSIS — I25.10 CORONARY ARTERY DISEASE INVOLVING NATIVE CORONARY ARTERY OF NATIVE HEART WITHOUT ANGINA PECTORIS: Chronic | ICD-10-CM

## 2021-12-07 DIAGNOSIS — I10 ESSENTIAL HYPERTENSION: Primary | Chronic | ICD-10-CM

## 2021-12-07 DIAGNOSIS — E78.2 MIXED HYPERLIPIDEMIA: ICD-10-CM

## 2021-12-07 DIAGNOSIS — E11.9 TYPE 2 DIABETES MELLITUS WITHOUT COMPLICATION, WITHOUT LONG-TERM CURRENT USE OF INSULIN (HCC): Chronic | ICD-10-CM

## 2021-12-07 PROCEDURE — 93000 ELECTROCARDIOGRAM COMPLETE: CPT | Performed by: NURSE PRACTITIONER

## 2021-12-07 PROCEDURE — 99213 OFFICE O/P EST LOW 20 MIN: CPT | Performed by: NURSE PRACTITIONER

## 2021-12-07 NOTE — PROGRESS NOTES
Date of Office Visit: 2021  Encounter Provider: EDWARD Beaver  Place of Service: [facility]  Patient Name: Raymundo Lopez  :1935    Chief Complaint   Patient presents with   • Coronary Artery Disease   • Follow-up   :     HPI: Raymundo Lopez is a 86 y.o. male who is a patient of  Dr. Loomis is new to me today.  He has a history of coronary disease with balloon angioplasty to diagonal branch and previous setting of acute MI.  He also has a history of hypertension, hyperlipidemia and chronic lung disease.  He was last in the office in  of this year and 6 months prior he presented for acute onset of chest pain.  He was tachycardic and we increased his beta-blocker and did a stress test.  Stress test was negative.  Patient felt better on higher dose of beta-blocker and improved.      Overall he is doing well.  He denies any chest pain, pressure or tightness.  He has been taking his medications.  His last lipid panel was a year ago and everything was at goal.  His cholesterol actually has a tendency to run on the low side.  He could work on physical activity but he is got some right hip and lower back pain that have been giving him some difficulty.      Previous testing and notes have been reviewed by me.   Past Medical History:   Diagnosis Date   • Angina pectoris (HCC)    • Asthma    • Garcia esophagus    • CAD (coronary artery disease)    • Cancer (HCC)     prostate   • H/O electrocardiogram    • Health care maintenance    • History of EKG 2015   • Hyperlipidemia    • Hypertension    • Myocardial infarction (HCC)        Past Surgical History:   Procedure Laterality Date   • ANAL FISTULA REPAIR     • COLONOSCOPY N/A 3/29/2017    Procedure: COLONOSCOPY TO CECUM & T.I. WITH COLD POLYPECTOMIES,  HOT SNARE POLYPECTOMIES;  Surgeon: Oren HARPER MD;  Location: [facility] ENDOSCOPY;  Service:    • ENDOSCOPY N/A 2016    Procedure:  "ESOPHAGOGASTRODUODENOSCOPY WITH BIOPSIES;  Surgeon: Oren HARPER MD;  Location: [facility] ENDOSCOPY;  Service:    • PROSTATECTOMY         Social History     Socioeconomic History   • Marital status:    Tobacco Use   • Smoking status: Former Smoker   • Smokeless tobacco: Never Used   Substance and Sexual Activity   • Alcohol use: No   • Drug use: No   • Sexual activity: Defer       History reviewed. No pertinent family history.    ROS    No Known Allergies      Current Outpatient Medications:   •  aspirin 81 MG tablet, Take 1 tablet by mouth 2 (two) times a day., Disp: , Rfl:   •  atorvastatin (LIPITOR) 20 MG tablet, TAKE 1 TABLET BY MOUTH EVERYDAY AT BEDTIME, Disp: 90 tablet, Rfl: 3  •  clopidogrel (PLAVIX) 75 MG tablet, TAKE 1 TABLET BY MOUTH EVERY DAY, Disp: 90 tablet, Rfl: 3  •  losartan (COZAAR) 25 MG tablet, TAKE 1 TABLET BY MOUTH EVERY DAY, Disp: 90 tablet, Rfl: 3  •  metFORMIN (GLUCOPHAGE) 500 MG tablet, Take 1 tablet by mouth 2 (Two) Times a Day With Meals., Disp: 180 tablet, Rfl: 3  •  metoprolol tartrate (LOPRESSOR) 100 MG tablet, Take 1 tablet by mouth 2 (Two) Times a Day., Disp: 180 tablet, Rfl: 3  •  montelukast (SINGULAIR) 10 MG tablet, Take 1 tablet by mouth daily., Disp: , Rfl:   •  nitroglycerin (Nitrostat) 0.4 MG SL tablet, Place 1 tablet under the tongue Every 5 (Five) Minutes As Needed for Chest Pain. Take no more than 3 doses in 15 minutes., Disp: 25 tablet, Rfl: 3  •  omeprazole (priLOSEC) 20 MG capsule, Take 20 mg by mouth Daily., Disp: , Rfl:   •  Trelegy Ellipta 100-62.5-25 MCG/INH inhaler, , Disp: , Rfl:       Objective:     Vitals:    12/07/21 1305   BP: 128/86   BP Location: Left arm   Patient Position: Sitting   Pulse: 61   SpO2: 98%   Weight: 109 kg (239 lb 9.6 oz)   Height: 177.8 cm (70\")     Body mass index is 34.38 kg/m².    PHYSICAL EXAM:    Constitutional:       General: Not in acute distress.     Appearance: Normal appearance. Well-developed.   Eyes:      Pupils: " Pupils are equal, round, and reactive to light.   HENT:      Head: Normocephalic.   Neck:      Vascular: No carotid bruit or JVD.   Pulmonary:      Effort: Pulmonary effort is normal. No tachypnea.      Breath sounds: Examination of the right-middle field reveals wheezing. Examination of the left-middle field reveals wheezing. Examination of the right-lower field reveals wheezing. Examination of the left-lower field reveals wheezing. Wheezing present. No rales.   Cardiovascular:      Normal rate. Regular rhythm.      No gallop.   Pulses:     Intact distal pulses.   Edema:     Peripheral edema absent.   Abdominal:      General: Bowel sounds are normal.      Palpations: Abdomen is soft.      Tenderness: There is no abdominal tenderness.   Musculoskeletal: Normal range of motion.      Cervical back: Normal range of motion and neck supple. No edema. Skin:     General: Skin is warm and dry.   Neurological:      Mental Status: Alert and oriented to person, place, and time.           ECG 12 Lead    Date/Time: 12/7/2021 1:14 PM  Performed by: Deirdre Bryan APRN  Authorized by: Deirdre Bryan APRN   Comparison: compared with previous ECG from 6/3/2021  Similar to previous ECG  Rhythm: sinus rhythm  Rate: normal  QRS axis: normal    Clinical impression: normal ECG              Assessment:       Diagnosis Plan   1. Essential hypertension     2. Familial hypercholesterolemia     3. Coronary artery disease involving native coronary artery of native heart without angina pectoris     4. Mixed hyperlipidemia     5. Type 2 diabetes mellitus without complication, without long-term current use of insulin ([facility])       Orders Placed This Encounter   Procedures   • ECG 12 Lead     This order was created via procedure documentation     Order Specific Question:   Release to patient     Answer:   Immediate          Plan:       Patient symptoms are well controlled.  He occasionally uses a nitroglycerin maybe once a month.  From day  today he tolerates activities with out angina.  I am getting continue his current medication regimen.  I would like him to get a lipid panel the next time he is at his primary care and I have ordered that.  He can follow-up in 6 months.         Your medication list          Accurate as of December 7, 2021  1:14 PM. If you have any questions, ask your nurse or doctor.            CONTINUE taking these medications      Instructions Last Dose Given Next Dose Due   aspirin 81 MG tablet      Take 1 tablet by mouth 2 (two) times a day.       atorvastatin 20 MG tablet  Commonly known as: LIPITOR      TAKE 1 TABLET BY MOUTH EVERYDAY AT BEDTIME       clopidogrel 75 MG tablet  Commonly known as: PLAVIX      TAKE 1 TABLET BY MOUTH EVERY DAY       losartan 25 MG tablet  Commonly known as: COZAAR      TAKE 1 TABLET BY MOUTH EVERY DAY       metFORMIN 500 MG tablet  Commonly known as: GLUCOPHAGE      Take 1 tablet by mouth 2 (Two) Times a Day With Meals.       metoprolol tartrate 100 MG tablet  Commonly known as: LOPRESSOR      Take 1 tablet by mouth 2 (Two) Times a Day.       nitroglycerin 0.4 MG SL tablet  Commonly known as: Nitrostat      Place 1 tablet under the tongue Every 5 (Five) Minutes As Needed for Chest Pain. Take no more than 3 doses in 15 minutes.       omeprazole 20 MG capsule  Commonly known as: priLOSEC      Take 20 mg by mouth Daily.       Singulair 10 MG tablet  Generic drug: montelukast      Take 1 tablet by mouth daily.       Trelegy Ellipta 100-62.5-25 MCG/INH inhaler  Generic drug: Fluticasone-Umeclidin-Vilant                    As always, it has been a pleasure to participate in your patient's care.      Sincerely,     Deirdre LEON

## 2021-12-16 ENCOUNTER — OFFICE VISIT (OUTPATIENT)
Dept: INTERNAL MEDICINE | Facility: CLINIC | Age: 86
End: 2021-12-16

## 2021-12-16 VITALS
BODY MASS INDEX: 34.3 KG/M2 | SYSTOLIC BLOOD PRESSURE: 126 MMHG | RESPIRATION RATE: 16 BRPM | OXYGEN SATURATION: 94 % | WEIGHT: 239.6 LBS | HEIGHT: 70 IN | HEART RATE: 60 BPM | TEMPERATURE: 97.4 F | DIASTOLIC BLOOD PRESSURE: 82 MMHG

## 2021-12-16 DIAGNOSIS — G89.29 CHRONIC RIGHT HIP PAIN: ICD-10-CM

## 2021-12-16 DIAGNOSIS — M25.551 CHRONIC RIGHT HIP PAIN: ICD-10-CM

## 2021-12-16 DIAGNOSIS — I10 ESSENTIAL HYPERTENSION: Primary | ICD-10-CM

## 2021-12-16 DIAGNOSIS — E78.2 MIXED HYPERLIPIDEMIA: ICD-10-CM

## 2021-12-16 DIAGNOSIS — E11.65 TYPE 2 DIABETES MELLITUS WITH HYPERGLYCEMIA, WITHOUT LONG-TERM CURRENT USE OF INSULIN (HCC): ICD-10-CM

## 2021-12-16 DIAGNOSIS — K22.70 BARRETT'S ESOPHAGUS WITHOUT DYSPLASIA: ICD-10-CM

## 2021-12-16 PROCEDURE — 99214 OFFICE O/P EST MOD 30 MIN: CPT | Performed by: FAMILY MEDICINE

## 2021-12-16 NOTE — PROGRESS NOTES
"Chief Complaint  Diabetes    Subjective          Raymundo Lopez presents to [facility]  History of Present Illness     Hypertension - stable.  Patient taking medication as prescribed.  Denies chest pain, shortness of breath, headache, lower extremity edema.  Patient is taking losartan, metoprolol.     HLD-stable.  Patient taking atorvastatin as prescribed.  Trying to adhere to a low-fat diet.     Garcia's esophagus - takes omeprazole 20mg and is stable on that medication.    Diabetes -recently increase his Metformin to 500 mg in the morning and 1000 mg at bedtime.  Last A1c was 7.2.  Not due for A1c yet.     Patient fell off a ladder a few years ago and now having chronic hip pain.  Looking to see Dr. Hairston.      Objective   Vital Signs:   /82 (BP Location: Left arm, Patient Position: Sitting, Cuff Size: Adult)   Pulse 60   Temp 97.4 °F (36.3 °C) (Temporal)   Resp 16   Ht 177.8 cm (70\")   Wt 109 kg (239 lb 9.6 oz)   SpO2 94%   BMI 34.38 kg/m²     Physical Exam  Vitals and nursing note reviewed.   Constitutional:       General: He is not in acute distress.     Appearance: Normal appearance.   Cardiovascular:      Rate and Rhythm: Normal rate and regular rhythm.      Heart sounds: Normal heart sounds. No murmur heard.      Pulmonary:      Effort: Pulmonary effort is normal.      Breath sounds: Normal breath sounds.   Neurological:      Mental Status: He is alert.        Result Review :   The following data was reviewed by: Oren Sahni MD on 12/16/2021:  Common labs    Common Labsle 6/15/21 6/29/21 10/5/21 10/5/21      1232 1232   Glucose    112 (A)   BUN    20   Creatinine    0.94   eGFR Non  Am    76   eGFR African Am    93   Sodium    142   Potassium    5.0   Chloride    105   Calcium    9.4   Total Protein    6.6   Albumin    4.30   Total Bilirubin    0.3   Alkaline Phosphatase    102   AST (SGOT)    20   ALT (SGPT)    17   Hemoglobin A1C 7.40 (A)  7.20 (A)  "   Microalbumin, Urine  17.2     (A) Abnormal value       Comments are available for some flowsheets but are not being displayed.                     Assessment and Plan    Diagnoses and all orders for this visit:    1. Essential hypertension (Primary)    2. 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 Type 2 diabetes mellitus with hyperglycemia, without long-term current use of insulin (HCC)    5. Chronic right hip pain      Stable conditions as above.  Continue current meds as above.  We will get fresh labs at his next appointment.  If he needs my assistance getting to see Dr. Hairston, I will be glad to help him but it sounds like he can likely call and get the appointment without my assistance.          Follow Up   Return in about 4 months (around 4/15/2022) for Next scheduled follow up.  Patient was given instructions and counseling regarding his condition or for health maintenance advice. Please see specific information pulled into the AVS if appropriate.

## 2021-12-16 NOTE — PATIENT INSTRUCTIONS
Diabetes Mellitus and Nutrition, Adult  When you have diabetes, or diabetes mellitus, it is very important to have healthy eating habits because your blood sugar (glucose) levels are greatly affected by what you eat and drink. Eating healthy foods in the right amounts, at about the same times every day, can help you:  · Control your blood glucose.  · Lower your risk of heart disease.  · Improve your blood pressure.  · Reach or maintain a healthy weight.  What can affect my meal plan?  Every person with diabetes is different, and each person has different needs for a meal plan. Your health care provider may recommend that you work with a dietitian to make a meal plan that is best for you. Your meal plan may vary depending on factors such as:  · The calories you need.  · The medicines you take.  · Your weight.  · Your blood glucose, blood pressure, and cholesterol levels.  · Your activity level.  · Other health conditions you have, such as heart or kidney disease.  How do carbohydrates affect me?  Carbohydrates, also called carbs, affect your blood glucose level more than any other type of food. Eating carbs naturally raises the amount of glucose in your blood. Carb counting is a method for keeping track of how many carbs you eat. Counting carbs is important to keep your blood glucose at a healthy level, especially if you use insulin or take certain oral diabetes medicines.  It is important to know how many carbs you can safely have in each meal. This is different for every person. Your dietitian can help you calculate how many carbs you should have at each meal and for each snack.  How does alcohol affect me?  Alcohol can cause a sudden decrease in blood glucose (hypoglycemia), especially if you use insulin or take certain oral diabetes medicines. Hypoglycemia can be a life-threatening condition. Symptoms of hypoglycemia, such as sleepiness, dizziness, and confusion, are similar to symptoms of having too much  "alcohol.  · Do not drink alcohol if:  ? Your health care provider tells you not to drink.  ? You are pregnant, may be pregnant, or are planning to become pregnant.  · If you drink alcohol:  ? Do not drink on an empty stomach.  ? Limit how much you use to:  § 0-1 drink a day for women.  § 0-2 drinks a day for men.  ? Be aware of how much alcohol is in your drink. In the [place] one drink equals one 12 oz bottle of beer (355 mL), one 5 oz glass of wine (148 mL), or one 1½ oz glass of hard liquor (44 mL).  ? Keep yourself hydrated with water, diet soda, or unsweetened iced tea.  § Keep in mind that regular soda, juice, and other mixers may contain a lot of sugar and must be counted as carbs.  What are tips for following this plan?    Reading food labels  · Start by checking the serving size on the \"Nutrition Facts\" label of packaged foods and drinks. The amount of calories, carbs, fats, and other nutrients listed on the label is based on one serving of the item. Many items contain more than one serving per package.  · Check the total grams (g) of carbs in one serving. You can calculate the number of servings of carbs in one serving by dividing the total carbs by 15. For example, if a food has 30 g of total carbs per serving, it would be equal to 2 servings of carbs.  · Check the number of grams (g) of saturated fats and trans fats in one serving. Choose foods that have a low amount or none of these fats.  · Check the number of milligrams (mg) of salt (sodium) in one serving. Most people should limit total sodium intake to less than 2,300 mg per day.  · Always check the nutrition information of foods labeled as \"low-fat\" or \"nonfat.\" These foods may be higher in added sugar or refined carbs and should be avoided.  · Talk to your dietitian to identify your daily goals for nutrients listed on the label.  Shopping  · Avoid buying canned, pre-made, or processed foods. These foods tend to be high in fat, sodium, and added " sugar.  · Shop around the outside edge of the grocery store. This is where you will most often find fresh fruits and vegetables, bulk grains, fresh meats, and fresh dairy.  Cooking  · Use low-heat cooking methods, such as baking, instead of high-heat cooking methods like deep frying.  · Cook using healthy oils, such as olive, canola, or sunflower oil.  · Avoid cooking with butter, cream, or high-fat meats.  Meal planning  · Eat meals and snacks regularly, preferably at the same times every day. Avoid going long periods of time without eating.  · Eat foods that are high in fiber, such as fresh fruits, vegetables, beans, and whole grains. Talk with your dietitian about how many servings of carbs you can eat at each meal.  · Eat 4-6 oz (112-168 g) of lean protein each day, such as lean meat, chicken, fish, eggs, or tofu. One ounce (oz) of lean protein is equal to:  ? 1 oz (28 g) of meat, chicken, or fish.  ? 1 egg.  ? ¼ cup (62 g) of tofu.  · Eat some foods each day that contain healthy fats, such as avocado, nuts, seeds, and fish.  What foods should I eat?  Fruits  Berries. Apples. Oranges. Peaches. Apricots. Plums. Grapes. Papo. Papaya. Pomegranate. Kiwi. Cherries.  Vegetables  Lettuce. Spinach. Leafy greens, including kale, chard, courtney greens, and mustard greens. Beets. Cauliflower. Cabbage. Broccoli. Carrots. Green beans. Tomatoes. Peppers. Onions. Cucumbers. [place] sprouts.  Grains  Whole grains, such as whole-wheat or whole-grain bread, crackers, tortillas, cereal, and pasta. Unsweetened oatmeal. Quinoa. Brown or wild rice.  Meats and other proteins  Seafood. Poultry without skin. Lean cuts of poultry and beef. Tofu. Nuts. Seeds.  Dairy  Low-fat or fat-free dairy products such as milk, yogurt, and cheese.  The items listed above may not be a complete list of foods and beverages you can eat. Contact a dietitian for more information.  What foods should I avoid?  Fruits  Fruits canned with  syrup.  Vegetables  Canned vegetables. Frozen vegetables with butter or cream sauce.  Grains  Refined white flour and flour products such as bread, pasta, snack foods, and cereals. Avoid all processed foods.  Meats and other proteins  Fatty cuts of meat. Poultry with skin. Breaded or fried meats. Processed meat. Avoid saturated fats.  Dairy  Full-fat yogurt, cheese, or milk.  Beverages  Sweetened drinks, such as soda or iced tea.  The items listed above may not be a complete list of foods and beverages you should avoid. Contact a dietitian for more information.  Questions to ask a health care provider  · Do I need to meet with a diabetes educator?  · Do I need to meet with a dietitian?  · What number can I call if I have questions?  · When are the best times to check my blood glucose?  Where to find more information:  · American Diabetes Association: diabetes.org  · Academy of Nutrition and Dietetics: www.eatright.org  · National [place] of Diabetes and Digestive and Kidney Diseases: www.niddk.nih.gov  · Association of Diabetes Care and Education Specialists: www.diabeteseducator.org  Summary  · It is important to have healthy eating habits because your blood sugar (glucose) levels are greatly affected by what you eat and drink.  · A healthy meal plan will help you control your blood glucose and maintain a healthy lifestyle.  · Your health care provider may recommend that you work with a dietitian to make a meal plan that is best for you.  · Keep in mind that carbohydrates (carbs) and alcohol have immediate effects on your blood glucose levels. It is important to count carbs and to use alcohol carefully.  This information is not intended to replace advice given to you by your health care provider. Make sure you discuss any questions you have with your health care provider.  Document Revised: 11/24/2020 Document Reviewed: 11/24/2020  Elsevier Patient Education © 2021 [employer]vier Inc.

## 2022-01-10 ENCOUNTER — OFFICE VISIT (OUTPATIENT)
Dept: ORTHOPEDIC SURGERY | Facility: CLINIC | Age: 87
End: 2022-01-10

## 2022-01-10 ENCOUNTER — PATIENT ROUNDING (BHMG ONLY) (OUTPATIENT)
Dept: ORTHOPEDIC SURGERY | Facility: CLINIC | Age: 87
End: 2022-01-10

## 2022-01-10 VITALS — TEMPERATURE: 97.7 F | BODY MASS INDEX: 35.25 KG/M2 | WEIGHT: 238 LBS | HEIGHT: 69 IN | RESPIRATION RATE: 18 BRPM

## 2022-01-10 DIAGNOSIS — R52 PAIN: ICD-10-CM

## 2022-01-10 DIAGNOSIS — M75.81 ROTATOR CUFF TENDINITIS, RIGHT: Primary | ICD-10-CM

## 2022-01-10 PROCEDURE — 99203 OFFICE O/P NEW LOW 30 MIN: CPT | Performed by: ORTHOPAEDIC SURGERY

## 2022-01-10 PROCEDURE — 73030 X-RAY EXAM OF SHOULDER: CPT | Performed by: ORTHOPAEDIC SURGERY

## 2022-01-10 RX ORDER — ALBUTEROL SULFATE 90 UG/1
2 AEROSOL, METERED RESPIRATORY (INHALATION) EVERY 4 HOURS PRN
COMMUNITY

## 2022-01-10 NOTE — PROGRESS NOTES
General Exam    Patient: Raymundo Lopez    YOB: 1935    Medical Record Number: [id number]    Chief Complaints: Right shoulder pain    History of Present Illness:     86 y.o. male patient who presents for evaluation treatment right shoulder pain.  Patient states he has had some issues over the past several months he has had these previous pains in the past but usually resolves on its own.  States he is right-hand dominant has some difficulty reaching overhead or sometimes out or behind his back.  Takes ibuprofen which helps.  Has some pain at night when he is laying on that shoulder.  Locates the pain mainly anteriorly and somewhat superiorly about her shoulder no recent trauma    Denies any numbness or tingling.  Denies any fevers, cough or shortness of breath.    Allergies: No Known Allergies    Home Medications:      Current Outpatient Medications:   •  albuterol sulfate  (90 Base) MCG/ACT inhaler, Inhale 2 puffs Every 4 (Four) Hours As Needed for Wheezing. As needes as a rescue inhaler for asthma, Disp: , Rfl:   •  aspirin 81 MG tablet, Take 1 tablet by mouth 2 (two) times a day., Disp: , Rfl:   •  atorvastatin (LIPITOR) 20 MG tablet, TAKE 1 TABLET BY MOUTH EVERYDAY AT BEDTIME, Disp: 90 tablet, Rfl: 3  •  clopidogrel (PLAVIX) 75 MG tablet, TAKE 1 TABLET BY MOUTH EVERY DAY, Disp: 90 tablet, Rfl: 3  •  losartan (COZAAR) 25 MG tablet, TAKE 1 TABLET BY MOUTH EVERY DAY, Disp: 90 tablet, Rfl: 3  •  metFORMIN (GLUCOPHAGE) 500 MG tablet, Take 1 tablet by mouth 2 (Two) Times a Day With Meals., Disp: 180 tablet, Rfl: 3  •  metoprolol tartrate (LOPRESSOR) 100 MG tablet, Take 1 tablet by mouth 2 (Two) Times a Day., Disp: 180 tablet, Rfl: 3  •  montelukast (SINGULAIR) 10 MG tablet, Take 1 tablet by mouth daily., Disp: , Rfl:   •  nitroglycerin (Nitrostat) 0.4 MG SL tablet, Place 1 tablet under the tongue Every 5 (Five) Minutes As Needed for Chest Pain. Take no more than 3 doses in 15 minutes., Disp: 25  "tablet, Rfl: 3  •  omeprazole (priLOSEC) 20 MG capsule, Take 20 mg by mouth Daily., Disp: , Rfl:   •  Trelegy Ellipta 100-62.5-25 MCG/INH inhaler, , Disp: , Rfl:     Past Medical History:   Diagnosis Date   • Angina pectoris (HCC)    • Asthma    • Garcia esophagus    • CAD (coronary artery disease)    • Cancer (HCC)     prostate   • H/O electrocardiogram    • Health care maintenance    • History of EKG 06/17/2015   • Hyperlipidemia    • Hypertension    • Myocardial infarction (HCC)        Past Surgical History:   Procedure Laterality Date   • ANAL FISTULA REPAIR     • COLONOSCOPY N/A 3/29/2017    Procedure: COLONOSCOPY TO CECUM & T.I. WITH COLD POLYPECTOMIES,  HOT SNARE POLYPECTOMIES;  Surgeon: Oren HARPER MD;  Location: [facility] ENDOSCOPY;  Service:    • ENDOSCOPY N/A 11/29/2016    Procedure: ESOPHAGOGASTRODUODENOSCOPY WITH BIOPSIES;  Surgeon: Oren HARPER MD;  Location: [facility] ENDOSCOPY;  Service:    • PROSTATECTOMY         Social History     Occupational History   • Not on file   Tobacco Use   • Smoking status: Former Smoker   • Smokeless tobacco: Never Used   Vaping Use   • Vaping Use: Never used   Substance and Sexual Activity   • Alcohol use: No   • Drug use: No   • Sexual activity: Defer      Social History     Social History Narrative   • Not on file       History reviewed. No pertinent family history.    Review of Systems:      Constitutional: Denies fever, shaking or chills         All other pertinent positives and negatives as noted above in HPI.    Physical Exam: 86 y.o. male    Vitals:    01/10/22 1056   Resp: 18   Temp: 97.7 °F (36.5 °C)   Weight: 108 kg (238 lb)   Height: 175.3 cm (69\")       General:  Patient is awake and alert.  Appears in no acute distress or discomfort.        Musculoskeletal/Extremities:    Right upper extremity: No tenderness palpation.  No gross abnormalities observation.  Shoulder range of motion overall full with some discomfort with terminal abduction and " reaching behind his back.  Positive liftoff, Fountain test.  Negative Neer, indican sign, belly press and extensor lag.  Sensation and strength intact distally.  Rotator cuff strength appears intact.         Radiology:       3 views consisting of AP, axillary scapular Y taken reviewed to evaluate the patient's complaint/s.    Imaging shows some very minimal to mild degenerative changes of the glenohumeral joint.  No acute fractures appreciated     No imaging for comparison.    Assessment: Right shoulder rotator cuff tendinitis      Plan:      Discussed findings with the patient recommend conservative treatment at this time.  We will do some formal physical therapy may continue anti-inflammatories for symptom management.  He will hold on any injections but things not improving next 3 to 4 weeks may return for injection if needed.           We will plan for follow up as needed.    All questions were answered.  Patient understands and agrees with the plan.    Olaf Suggs MD    01/10/2022    CC to Oren Sahin MD

## 2022-01-11 NOTE — PROGRESS NOTES
January 10, 2022    A PlayDo message has been sent to the patient for PATIENT ROUNDING with [facility]

## 2022-02-08 ENCOUNTER — TELEPHONE (OUTPATIENT)
Dept: PHYSICAL THERAPY | Facility: CLINIC | Age: 87
End: 2022-02-08

## 2022-02-18 ENCOUNTER — TREATMENT (OUTPATIENT)
Dept: PHYSICAL THERAPY | Facility: CLINIC | Age: 87
End: 2022-02-18

## 2022-02-18 DIAGNOSIS — M25.511 CHRONIC RIGHT SHOULDER PAIN: Primary | ICD-10-CM

## 2022-02-18 DIAGNOSIS — G89.29 CHRONIC RIGHT SHOULDER PAIN: Primary | ICD-10-CM

## 2022-02-18 DIAGNOSIS — R29.3 POOR POSTURE: ICD-10-CM

## 2022-02-18 PROCEDURE — 97161 PT EVAL LOW COMPLEX 20 MIN: CPT | Performed by: PHYSICAL THERAPIST

## 2022-02-18 PROCEDURE — 97530 THERAPEUTIC ACTIVITIES: CPT | Performed by: PHYSICAL THERAPIST

## 2022-02-18 PROCEDURE — 97110 THERAPEUTIC EXERCISES: CPT | Performed by: PHYSICAL THERAPIST

## 2022-02-18 NOTE — PROGRESS NOTES
"Physical Therapy Initial Evaluation and Plan of Care    Patient: Raymundo Lopez   : 1935  Diagnosis/ICD-10 Code:  Chronic right shoulder pain [M25.511, G89.29], RTC tendinitis.   Referring practitioner: Olaf Suggs MD  Past medical Hx reviewed: 2022    Subjective Evaluation    History of Present Illness  Onset date: Couple of years ago.  Mechanism of injury: I sleep on my R shoulder and I cannot avoid sleeping on it.  That seems to irritate it and it has been hurting for quite some time.  I have been taking IBU and it helps. I have the most pain in the morning.  Reaching overhead, behind the back are the most painful.  I don't experience much pain with lifting or driving.  Scratching washing upper back is difficult.        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 few hours a week.   Quality of life: good    Pain  Current pain ratin  At best pain ratin  At worst pain ratin (In AM, getting out of bed. )  Location: R anterior, superior shoulder (\"C pattern\" )    Quality: tight and dull ache (Occasional click.)  Relieving factors: rest and medications (IBU)  Aggravating factors: sleeping, outstretched reach and ambulation (prolonged walking)  Progression: no change    Social Support  Lives with: alone (family support in town.  )    Hand dominance: right    Diagnostic Tests  X-ray: abnormal      PLOF: Independent.    Objective          Palpation     Right   Hypertonic in the pectoralis minor. Tenderness of the biceps and pectoralis minor.     Cervical/Thoracic Screen   Cervical range of motion within normal limits with the following exceptions: Restricted at least 50% all planes.        Active Range of Motion   Left Shoulder   Flexion: 152 degrees   Extension: 58 degrees   Abduction: 152 degrees   External rotation 45°: 70 degrees   Internal rotation 45°: 70 degrees     Right Shoulder   Flexion: 140 degrees   Extension: 60 " degrees   Abduction: 120 degrees   External rotation 45°: 50 degrees   Internal rotation 45°: 70 degrees     Additional Active Range of Motion Details  SFMA:  Breakout for shoulder  R: active extension:  FN   R: ER: FP   R: IR:  DP     R elbow flex:  Not tested,   Prone Active shoulder flexion:  Not tested    Strength/Myotome Testing     Left Shoulder     Planes of Motion   Flexion: 4+   Extension: 5   Abduction: 4+   External rotation at 0°: 5   Internal rotation at 0°: 5     Right Shoulder     Planes of Motion   Flexion: 4   Extension: 5   Abduction: 4- (pain)   External rotation at 0°: 4- (pain)   Internal rotation at 0°: 5      General Comments     Shoulder Comments   SFMA:  UE pattern 1: L (FN) R:  (DP)               UE pattern 2: L (DN), R: (DP)                                        Assessment & Plan     Assessment  Impairments: abnormal muscle firing, abnormal or restricted ROM, activity intolerance, impaired physical strength, lacks appropriate home exercise program and pain with function  Functional Limitations: lifting, sleeping, pushing, uncomfortable because of pain, reaching behind back, reaching overhead and unable to perform repetitive tasks  Assessment details: Pt presents to PT with symptoms consistent with R shoulder dysfunction indicative of mild sub-acromial impingement and rotator cuff tendinits.  He has very limited posture and dysfunctional scapular mobility as well.  Pt would benefit from skilled PT intervention to address the deficits noted.   Prognosis: good    Goals  Plan Goals: SHORT TERM GOALS: 2-3 visits  1. Patient to be compliant with HEP and less diff. with sleeping waking <2/night due to pain. .  2. Increased (R) UE strength to 4/5 with no pain> 2/10 to allow for household and work activities.   3. Pt to exhibit (R) shoulder active flexion / ABD to 150°/140 deg in standing/sitting to assist with reaching overhead with less pain.    LONG TERM GOALS: 4-5 visits  1. Pt score <10%  perceived disability on DASH   2. Pt. to exhibit (R) shoulder AROM to WFL (> 160° flex/abd. IR/ER: to 70 deg in Supine to allow for reaching overhead and behind back without pain limiting function  3. Pt to exhibit 4+/5 UE strength to allow for pushing/pulling and lifting >5 #to occur with pain <2/10  4. Pt able to reach overhead and lift 10# (B) x 10 to allow for return to doing work around home.   5.  R UE breakouts to be listed as at minimum dysfunctional but no pain with all pattern 1 and 2 movements.        Plan  Therapy options: will be seen for skilled therapy services  Planned modality interventions: cryotherapy, electrical stimulation/Russian stimulation, TENS, thermotherapy (hydrocollator packs) and ultrasound  Other planned modality interventions: Dry Needling PRN  Planned therapy interventions: ADL retraining, flexibility, body mechanics training, home exercise program, functional ROM exercises, joint mobilization, manual therapy, neuromuscular re-education, postural training, soft tissue mobilization, spinal/joint mobilization, strengthening, stretching and therapeutic activities  Frequency: 1x week  Duration in visits: 5  Treatment plan discussed with: patient    :    Manual Therapy:    -     mins  55263;  Therapeutic Exercise:    12     mins  36660;     Neuromuscular Hubert:    -    mins  19283;    Therapeutic Activity:     15     mins  54785;   Pt education on X ray findings, diagnosis, prognosis and sleep modifications.    Gait Training:      -     mins  64482;     Ultrasound:     -     mins  07122;    Electrical Stimulation:    -     mins  28589 ( );  Dry Needling     -     mins self-pay    Timed Treatment:   27   mins   Total Treatment:     60   mins    PT SIGNATURE:  Travis Bell DPT, PT     CECIL Odonnell License #: [id number]    DATE TREATMENT INITIATED: 2/18/2022    Medicare Initial Certification  Certification Period: 5/19/2022  I certify that the therapy services are furnished  while this patient is under my care.  The services outlined above are required by this patient, and will be reviewed every 90 days.     PHYSICIAN: Olaf Suggs MD      DATE:     Please sign and return via fax to [phone].. Thank you, [facility].

## 2022-02-24 ENCOUNTER — TELEPHONE (OUTPATIENT)
Dept: PHYSICAL THERAPY | Facility: CLINIC | Age: 87
End: 2022-02-24

## 2022-03-03 ENCOUNTER — TREATMENT (OUTPATIENT)
Dept: PHYSICAL THERAPY | Facility: CLINIC | Age: 87
End: 2022-03-03

## 2022-03-03 DIAGNOSIS — M25.511 CHRONIC RIGHT SHOULDER PAIN: Primary | ICD-10-CM

## 2022-03-03 DIAGNOSIS — R29.3 POOR POSTURE: ICD-10-CM

## 2022-03-03 DIAGNOSIS — G89.29 CHRONIC RIGHT SHOULDER PAIN: Primary | ICD-10-CM

## 2022-03-03 PROCEDURE — 97110 THERAPEUTIC EXERCISES: CPT | Performed by: PHYSICAL THERAPIST

## 2022-03-03 NOTE — PROGRESS NOTES
Physical Therapy Daily Progress Note  Visit # 2           Patient: Raymundo Lopez   : 1935  Diagnosis/ICD-10 Code:  Chronic right shoulder pain [M25.511, G89.29]  Referring practitioner: Olaf Suggs MD  Date of Initial Evaluation:  Type: THERAPY  Noted: 2022      Subjective  Raymundo Lopez reports:   No significant changes from status at PT eval through today.  Doing well with initial HEP, feeling some clicking sensation in shoulders with ROM but no pain.        Objective   See Exercise, Manual, and Modality Logs for complete treatment.     Reviewed current HEP, progressed therex program with exercises as noted.  Added supine shoulder flexion w/wt, supine shoulder press w/wt to HEP, written instructions issued (GetFresh code [id number]).      Assessment/Plan  Tolerated continued progression of therapeutic exercise/therapeutic activity well today, no increased pain reported during or after exercises.  Would continue to benefit from skilled PT progressing with functional ROM, strength, and stability of (R) shoulder.       Progress per Plan of Care and Progress strengthening /stabilization /functional activity           Timed:         Manual Therapy:         mins  71003     Therapeutic Exercise:     30    mins  64696     Neuromuscular Hubert:        mins  93464    Therapeutic Activity:          mins  83667     Gait Training:           mins  55924     Ultrasound:          mins  65547    Ionto                                   mins  88844  Self Care                            mins  19563    Un-Timed:  Electrical Stimulation:         mins 91297 ( )  Traction          mins 69338    Timed Treatment:   30   mins   Total Treatment:     36   mins    UZAIR Hughes License #[id number]  Physical Therapist Assistant

## 2022-03-10 ENCOUNTER — TREATMENT (OUTPATIENT)
Dept: PHYSICAL THERAPY | Facility: CLINIC | Age: 87
End: 2022-03-10

## 2022-03-10 DIAGNOSIS — M25.511 CHRONIC RIGHT SHOULDER PAIN: Primary | ICD-10-CM

## 2022-03-10 DIAGNOSIS — R29.3 POOR POSTURE: ICD-10-CM

## 2022-03-10 DIAGNOSIS — G89.29 CHRONIC RIGHT SHOULDER PAIN: Primary | ICD-10-CM

## 2022-03-10 PROCEDURE — 97110 THERAPEUTIC EXERCISES: CPT | Performed by: PHYSICAL THERAPIST

## 2022-03-10 NOTE — PROGRESS NOTES
Physical Therapy Daily Progress Note      Patient: Raymundo Lopez   : 1935  Diagnosis/ICD-10 Code:  Chronic right shoulder pain [M25.511, G89.29]  Referring practitioner: Olaf Suggs MD  Date of Initial Visit: Type: THERAPY  Noted: 2022  Today's Date: 3/10/2022  Patient seen for 3 sessions    Subjective : Raymundo Lopez reports: No new complaints.  Things are going along okay.      Objective:   See Exercise, Manual, and Modality Logs for complete treatment.     Assessment/Plan:Pt tolerated treatement well.  He did require several cues for correct form with T band strengthening maneuvers today.  He was provided T band and handouts to perform at home      Progress per Plan of Care and Progress strengthening /stabilization /functional activity     Manual Therapy:    -     mins  49101;  Therapeutic Exercise:    40     mins  79249;     Neuromuscular Hubert:    -    mins  32299;    Therapeutic Activity:     -     mins  67708;     Gait Training:      -     mins  61198;     Ultrasound:     -     mins  79310;    Electrical Stimulation:    -     mins  51514 ( );  Dry Needling     -     mins self-pay    Timed Treatment:   40   mins   Total Treatment:     45   mins      CECIL Odonnell License #[id number]    Physical Therapist

## 2022-03-17 ENCOUNTER — TREATMENT (OUTPATIENT)
Dept: PHYSICAL THERAPY | Facility: CLINIC | Age: 87
End: 2022-03-17

## 2022-03-17 DIAGNOSIS — R29.3 POOR POSTURE: ICD-10-CM

## 2022-03-17 DIAGNOSIS — M25.511 CHRONIC RIGHT SHOULDER PAIN: Primary | ICD-10-CM

## 2022-03-17 DIAGNOSIS — G89.29 CHRONIC RIGHT SHOULDER PAIN: Primary | ICD-10-CM

## 2022-03-17 PROCEDURE — 97110 THERAPEUTIC EXERCISES: CPT | Performed by: PHYSICAL THERAPIST

## 2022-03-17 NOTE — PROGRESS NOTES
Physical Therapy Daily Progress Note      Patient: Raymundo Lopez   : 1935  Diagnosis/ICD-10 Code:  Chronic right shoulder pain [M25.511, G89.29]  Referring practitioner: Olaf Suggs MD  Date of Initial Visit: Type: THERAPY  Noted: 2022  Today's Date: 3/17/2022  Patient seen for 4 sessions    Subjective : Raymundo Lopez reports: The shoulder is doing better overall.  I would like to keep coming to PT for a few more visits if that is what you suggest.      Objective:   Active Range of Motion     Right Shoulder   Flexion: 140 degrees  (155 deg)   Extension: 60 degrees  ( 65 deg)   Abduction: 120 degrees (140 deg)     See Exercise, Manual, and Modality Logs for complete treatment.     Assessment/Plan:Pt tolerated TE and progression well.  Due to adequate improvements and lessening symptoms, would recommend further PT to maximize his overall function and reduce the likelihood of recurrent issues in the future.      Progress per Plan of Care and Progress strengthening /stabilization /functional activity     Manual Therapy:    -     mins  22330;  Therapeutic Exercise:    42     mins  04617;     Neuromuscular Hubert:    -    mins  90323;    Therapeutic Activity:     -     mins  22971;     Gait Training:      -     mins  46350;     Ultrasound:     -     mins  52606;    Electrical Stimulation:    -     mins  87181 ( );  Dry Needling     -     mins self-pay    Timed Treatment:   42   mins   Total Treatment:     50   mins      CECIL Odonnell License #[id number]    Physical Therapist

## 2022-03-23 ENCOUNTER — TREATMENT (OUTPATIENT)
Dept: PHYSICAL THERAPY | Facility: CLINIC | Age: 87
End: 2022-03-23

## 2022-03-23 DIAGNOSIS — G89.29 CHRONIC RIGHT SHOULDER PAIN: Primary | ICD-10-CM

## 2022-03-23 DIAGNOSIS — R29.3 POOR POSTURE: ICD-10-CM

## 2022-03-23 DIAGNOSIS — M25.511 CHRONIC RIGHT SHOULDER PAIN: Primary | ICD-10-CM

## 2022-03-23 PROCEDURE — 97140 MANUAL THERAPY 1/> REGIONS: CPT | Performed by: PHYSICAL THERAPIST

## 2022-03-23 PROCEDURE — 97110 THERAPEUTIC EXERCISES: CPT | Performed by: PHYSICAL THERAPIST

## 2022-03-23 NOTE — PROGRESS NOTES
Physical Therapy Daily Progress Note      Patient: Raymundo Lopez   : 1935  Diagnosis/ICD-10 Code:  Chronic right shoulder pain [M25.511, G89.29]  Referring practitioner: Olaf Suggs MD  Date of Initial Visit: Type: THERAPY  Noted: 2022  Today's Date: 3/23/2022  Patient seen for 5 sessions    Subjective : Raymundo Lopez reports: The shoulder is actually doing better.  I don't know why I'm surprised by that.  It has been doing better.      Objective:   See Exercise, Manual, and Modality Logs for complete treatment.     Assessment/Plan:Pt continues to tolerate treatment well overall and we continue to address postural weakness and RTC and scapular stability.  Will re-assess for improvements next visits.     Progress per Plan of Care and Progress strengthening /stabilization /functional activity     Manual Therapy:    14     mins  08151;  Therapeutic Exercise:    40     mins  09646;     Neuromuscular Hubert:    -    mins  19405;    Therapeutic Activity:     -     mins  75274;     Gait Training:      -     mins  38336;     Ultrasound:     -     mins  52574;    Electrical Stimulation:    -     mins  20854 ( );  Dry Needling     -     mins self-pay    Timed Treatment:   54   mins   Total Treatment:     60   mins      CECIL Odonnell License #[id number]    Physical Therapist

## 2022-03-31 ENCOUNTER — TREATMENT (OUTPATIENT)
Dept: PHYSICAL THERAPY | Facility: CLINIC | Age: 87
End: 2022-03-31

## 2022-03-31 DIAGNOSIS — M25.511 CHRONIC RIGHT SHOULDER PAIN: Primary | ICD-10-CM

## 2022-03-31 DIAGNOSIS — G89.29 CHRONIC RIGHT SHOULDER PAIN: Primary | ICD-10-CM

## 2022-03-31 DIAGNOSIS — R29.3 POOR POSTURE: ICD-10-CM

## 2022-03-31 PROCEDURE — 97140 MANUAL THERAPY 1/> REGIONS: CPT | Performed by: PHYSICAL THERAPIST

## 2022-03-31 PROCEDURE — 97110 THERAPEUTIC EXERCISES: CPT | Performed by: PHYSICAL THERAPIST

## 2022-03-31 NOTE — PROGRESS NOTES
Physical Therapy Daily Progress Note      Patient: Raymundo Lopez   : 1935  Diagnosis/ICD-10 Code:  Chronic right shoulder pain [M25.511, G89.29]  Referring practitioner: Olaf Suggs MD  Date of Initial Visit: Type: THERAPY  Noted: 2022  Today's Date: 3/31/2022  Patient seen for 6 sessions    Subjective : Raymundo Lopez reports: I don't have any new complaints today.      Objective:   See Exercise, Manual, and Modality Logs for complete treatment.     Assessment/Plan:Pt has continued to make progress with PT intervention and AROM has recovered to WFL without pain.      Progress per Plan of Care and Progress strengthening /stabilization /functional activity     Manual Therapy:    10     mins  61814;  Therapeutic Exercise:    45     mins  02538;     Neuromuscular Hubert:    -    mins  09501;    Therapeutic Activity:     -     mins  99251;     Gait Training:      -     mins  41958;     Ultrasound:     -     mins  84121;    Electrical Stimulation:    -     mins  92378 ( );  Dry Needling     -     mins self-pay    Timed Treatment:   55   mins   Total Treatment:     60   mins      CECIL Odonnell License #[id number]    Physical Therapist

## 2022-04-07 ENCOUNTER — TREATMENT (OUTPATIENT)
Dept: PHYSICAL THERAPY | Facility: CLINIC | Age: 87
End: 2022-04-07

## 2022-04-07 DIAGNOSIS — R29.3 POOR POSTURE: ICD-10-CM

## 2022-04-07 DIAGNOSIS — M25.511 CHRONIC RIGHT SHOULDER PAIN: Primary | ICD-10-CM

## 2022-04-07 DIAGNOSIS — G89.29 CHRONIC RIGHT SHOULDER PAIN: Primary | ICD-10-CM

## 2022-04-07 PROCEDURE — 97110 THERAPEUTIC EXERCISES: CPT | Performed by: PHYSICAL THERAPIST

## 2022-04-07 PROCEDURE — 97140 MANUAL THERAPY 1/> REGIONS: CPT | Performed by: PHYSICAL THERAPIST

## 2022-04-07 NOTE — PROGRESS NOTES
Physical Therapy Daily Progress Note      Patient: Raymundo Lopez   : 1935  Diagnosis/ICD-10 Code:  Chronic right shoulder pain [M25.511, G89.29]  Referring practitioner: Olaf Suggs MD  Date of Initial Visit: Type: THERAPY  Noted: 2022  Today's Date: 2022  Patient seen for 7 sessions         Subjective   Raymundo Lopez reports: I am seeing improvement. Still having some  pain. No pain right now.      Objective   See Exercise, Manual, and Modality Logs for complete treatment.     Added rows with blue tband to HEP. Access Code: [email or web address]    R shld MMT  Abd: 4 (w mild pain)  ER: 4 (w mild pain)  Flexion: 5  IR: 5  Ext: 5      Assessment & Plan     Assessment    Assessment details: Pt presents with mild pain in the anterolateral shld when testing Abd and ER MMT. Pt is progressing in strength and decreasing in pain, but still has room for improvement with PT. R Scapula still presents with some restricted ROM w/ scapular mobs, but no pain reported.     Goals  Plan Goals: Plan Goals: SHORT TERM GOALS: 2-3 visits  1. Patient to be compliant with HEP and less diff. with sleeping waking <2/night due to pain. .  2. Increased (R) UE strength to 4/5 with no pain> 2/10 to allow for household and work activities.   3. Pt to exhibit (R) shoulder active flexion / ABD to 150°/140 deg in standing/sitting to assist with reaching overhead with less pain.    LONG TERM GOALS: 4-5 visits  1. Pt score <10% perceived disability on DASH   2. Pt. to exhibit (R) shoulder AROM to WFL (> 160° flex/abd. IR/ER: to 70 deg in Supine to allow for reaching overhead and behind back without pain limiting function  3. Pt to exhibit 4+/5 UE strength to allow for pushing/pulling and lifting >5 #to occur with pain <2/10  4. Pt able to reach overhead and lift 10# (B) x 10 to allow for return to doing work around home.   5.  R UE breakouts to be listed as at minimum dysfunctional but no pain with all pattern 1 and 2 movements.           Progress per Plan of Care and Progress strengthening /stabilization /functional activity. Pt and PT to discuss plan for d/c or continued therapy at next visit. Consider adding more to HEP. Pt reportsy told of two exercises he does at home.            Manual Therapy:   12      mins  34285;  Therapeutic Exercise:    30    mins  22563;     Neuromuscular Hubert:        mins  95912;    Therapeutic Activity:          mins  65663;     Gait Training:           mins  20953;     Ultrasound:          mins  78604;    Electrical Stimulation:         mins  58600 ( );  Dry Needling          mins self-pay    Timed Treatment:   42   mins   Total Treatment:     52   mins    Lizeth Carnes  Student Physical Therapist Assistant     I was present in the PT Department guiding the student by approving, concurring, and confirming the skilled judgement for all services rendered.    Lukas Grimes PTA  [place] License #[id number]  Physical Therapist Assistant

## 2022-04-12 RX ORDER — METOPROLOL TARTRATE 100 MG/1
TABLET ORAL
Qty: 180 TABLET | Refills: 3 | Status: SHIPPED | OUTPATIENT
Start: 2022-04-12

## 2022-04-14 ENCOUNTER — TREATMENT (OUTPATIENT)
Dept: PHYSICAL THERAPY | Facility: CLINIC | Age: 87
End: 2022-04-14

## 2022-04-14 DIAGNOSIS — R29.3 POOR POSTURE: ICD-10-CM

## 2022-04-14 DIAGNOSIS — M25.511 CHRONIC RIGHT SHOULDER PAIN: Primary | ICD-10-CM

## 2022-04-14 DIAGNOSIS — G89.29 CHRONIC RIGHT SHOULDER PAIN: Primary | ICD-10-CM

## 2022-04-14 PROCEDURE — 97530 THERAPEUTIC ACTIVITIES: CPT | Performed by: PHYSICAL THERAPIST

## 2022-04-14 PROCEDURE — 97110 THERAPEUTIC EXERCISES: CPT | Performed by: PHYSICAL THERAPIST

## 2022-04-14 NOTE — PROGRESS NOTES
Physical Therapy D[place] Note      Patient: Raymundo Lopez   : 1935  Diagnosis/ICD-10 Code:  Chronic right shoulder pain [M25.511, G89.29]  Referring practitioner: Olaf Suggs MD  Date of Initial Visit: Type: THERAPY  Noted: 2022  Today's Date: 2022  Patient seen for 8 sessions    Subjective : Raymundo Lopez reports: I am very pleased with my progress and actually surprised that therapy worked so well for me.  I can still tell there are things I can work on but I'm much better than when I started.      Objective: at evaluation Vs  (CURRENT)  Active Range of Motion   Left Shoulder   Flexion: 152 degrees  Extension: 58 degrees   Abduction: 152 degrees   External rotation 45°: 70 degrees   Internal rotation 45°: 70 degrees     Right Shoulder   Flexion: 140 degrees ( 160 deg)   Extension: 60 degrees (48 deg)   Abduction: 120 degrees (150 Deg)   External rotation 45°: 50 degrees ( 90 deg abd: 80 deg)    Internal rotation 45°: 70 degrees ( 90 deg abd:  80 deg)     Passive Range of Motion (Current)   R shoulder Shoulder   Flexion: 165 degrees   Extension: 80 degrees   Abduction: 170 degrees   External rotation 90° abd : 80 degrees   Internal rotation 90° abd : 80 degrees     Shoulder Comments   SFMA:  UE pattern 1: L (FN) R:  (DP)               UE pattern 2: L (DN), R: (DP) (CURRENT:  R: DN)     See Exercise, Manual, and Modality Logs for complete treatment.     Assessment/Plan:Julien has done very well with PT intervention and will be discharged to home exercises including progression of resisted UE elevation in flexion and scaption planes.      Other:  D/C to HEP        Manual Therapy:    -     mins  96912;  Therapeutic Exercise:    25     mins  69271;     Neuromuscular Hubert:    -    mins  91932;    Therapeutic Activity:     15     mins  27925;   Tests and measures and PT education.    Gait Training:      -     mins  54891;     Ultrasound:     -     mins  69538;    Electrical Stimulation:    -      mins  60953 ( );  Dry Needling     -     mins self-pay    Timed Treatment:   40   mins   Total Treatment:     45   mins      Travis Bell PT  [place] License #[id number]    Physical Therapist

## 2022-04-15 ENCOUNTER — OFFICE VISIT (OUTPATIENT)
Dept: INTERNAL MEDICINE | Facility: CLINIC | Age: 87
End: 2022-04-15

## 2022-04-15 VITALS
HEART RATE: 56 BPM | SYSTOLIC BLOOD PRESSURE: 124 MMHG | TEMPERATURE: 98 F | OXYGEN SATURATION: 96 % | HEIGHT: 69 IN | WEIGHT: 236 LBS | DIASTOLIC BLOOD PRESSURE: 82 MMHG | BODY MASS INDEX: 34.96 KG/M2

## 2022-04-15 DIAGNOSIS — I25.10 CORONARY ARTERY DISEASE INVOLVING NATIVE CORONARY ARTERY OF NATIVE HEART WITHOUT ANGINA PECTORIS: Chronic | ICD-10-CM

## 2022-04-15 DIAGNOSIS — E11.65 TYPE 2 DIABETES MELLITUS WITH HYPERGLYCEMIA, WITHOUT LONG-TERM CURRENT USE OF INSULIN: Chronic | ICD-10-CM

## 2022-04-15 DIAGNOSIS — I10 ESSENTIAL HYPERTENSION: Primary | Chronic | ICD-10-CM

## 2022-04-15 DIAGNOSIS — E78.01 FAMILIAL HYPERCHOLESTEROLEMIA: Chronic | ICD-10-CM

## 2022-04-15 PROCEDURE — 99214 OFFICE O/P EST MOD 30 MIN: CPT | Performed by: FAMILY MEDICINE

## 2022-04-15 NOTE — PROGRESS NOTES
"Chief Complaint  Hypertension and Diabetes    Subjective          Raymundo Lopez presents to [facility]  History of Present Illness     Hypertension - stable.  Patient taking medication as prescribed.  Denies chest pain, shortness of breath, headache, lower extremity edema.  Patient is taking losartan, metoprolol.     HLD-due for recheck, it does not look like he completed his cholesterol panel ordered in December.  Patient taking atorvastatin as prescribed.  Trying to adhere to a low-fat diet.  Goal LDL less than 70 due to coronary artery disease.     Diabetes -due for recheck recently increase his Metformin to 500 mg in the morning and 1000 mg at bedtime.  Last A1c was 7.2.       Coronary artery disease-no angina symptoms currently.  Taking aspirin, Plavix and beta-blocker therapy and on statin but will need to recheck lipids today.    Objective   Vital Signs:   /82 (BP Location: Left arm, Patient Position: Sitting, Cuff Size: Adult)   Pulse 56   Temp 98 °F (36.7 °C) (Temporal)   Ht 175.3 cm (69\")   Wt 107 kg (236 lb)   SpO2 96%   BMI 34.85 kg/m²            Physical Exam  Vitals and nursing note reviewed.   Constitutional:       General: He is not in acute distress.     Appearance: Normal appearance.   Cardiovascular:      Rate and Rhythm: Normal rate and regular rhythm.      Heart sounds: Normal heart sounds. No murmur heard.  Pulmonary:      Effort: Pulmonary effort is normal.      Breath sounds: Normal breath sounds.   Neurological:      Mental Status: He is alert.        Result Review :   The following data was reviewed by: Oren Sahni MD on 04/15/2022:  Common labs    Common Labsle 6/15/21 6/29/21 10/5/21 10/5/21      1232 1232   Glucose    112 (A)   BUN    20   Creatinine    0.94   eGFR Non  Am    76   eGFR African Am    93   Sodium    142   Potassium    5.0   Chloride    105   Calcium    9.4   Total Protein    6.6   Albumin    4.30   Total Bilirubin    " 0.3   Alkaline Phosphatase    102   AST (SGOT)    20   ALT (SGPT)    17   Hemoglobin A1C 7.40 (A)  7.20 (A)    Microalbumin, Urine  17.2     (A) Abnormal value       Comments are available for some flowsheets but are not being displayed.                     Assessment and Plan    Diagnoses and all orders for this visit:    1. Essential hypertension (Primary)  -     CBC & Differential  -     Comprehensive Metabolic Panel    2. Familial hypercholesterolemia  -     CBC & Differential  -     Comprehensive Metabolic Panel  -     Lipid Panel With LDL / HDL Ratio    3. Type 2 diabetes mellitus with hyperglycemia, without long-term current use of insulin (HCC)  -     CBC & Differential  -     Comprehensive Metabolic Panel  -     Hemoglobin A1c    4. Coronary artery disease involving native coronary artery of native heart without angina pectoris  -     CBC & Differential  -     Comprehensive Metabolic Panel      Stable chronic conditions as above.  Continue all medications as above.  Labs today.          Follow Up   Return in about 6 months (around 10/25/2022) for Medicare Wellness.  Patient was given instructions and counseling regarding his condition or for health maintenance advice. Please see specific information pulled into the AVS if appropriate.

## 2022-04-16 LAB
ALBUMIN SERPL-MCNC: 3.8 G/DL (ref 3.6–4.6)
ALBUMIN/GLOB SERPL: 1.5 {RATIO} (ref 1.2–2.2)
ALP SERPL-CCNC: 102 IU/L (ref 44–121)
ALT SERPL-CCNC: 14 IU/L (ref 0–44)
AST SERPL-CCNC: 17 IU/L (ref 0–40)
BASOPHILS # BLD AUTO: 0.1 X10E3/UL (ref 0–0.2)
BASOPHILS NFR BLD AUTO: 1 %
BILIRUB SERPL-MCNC: 0.4 MG/DL (ref 0–1.2)
BUN SERPL-MCNC: 23 MG/DL (ref 8–27)
BUN/CREAT SERPL: 23 (ref 10–24)
CALCIUM SERPL-MCNC: 9.2 MG/DL (ref 8.6–10.2)
CHLORIDE SERPL-SCNC: 106 MMOL/L (ref 96–106)
CHOLEST SERPL-MCNC: 117 MG/DL (ref 100–199)
CO2 SERPL-SCNC: 22 MMOL/L (ref 20–29)
CREAT SERPL-MCNC: 0.99 MG/DL (ref 0.76–1.27)
EGFRCR SERPLBLD CKD-EPI 2021: 75 ML/MIN/1.73
EOSINOPHIL # BLD AUTO: 0.5 X10E3/UL (ref 0–0.4)
EOSINOPHIL NFR BLD AUTO: 5 %
ERYTHROCYTE [DISTWIDTH] IN BLOOD BY AUTOMATED COUNT: 13.8 % (ref 11.6–15.4)
GLOBULIN SER CALC-MCNC: 2.5 G/DL (ref 1.5–4.5)
GLUCOSE SERPL-MCNC: 113 MG/DL (ref 65–99)
HBA1C MFR BLD: 7.3 % (ref 4.8–5.6)
HCT VFR BLD AUTO: 40.8 % (ref 37.5–51)
HDLC SERPL-MCNC: 50 MG/DL
HGB BLD-MCNC: 13.5 G/DL (ref 13–17.7)
IMM GRANULOCYTES # BLD AUTO: 0 X10E3/UL (ref 0–0.1)
IMM GRANULOCYTES NFR BLD AUTO: 0 %
LDLC SERPL CALC-MCNC: 52 MG/DL (ref 0–99)
LDLC/HDLC SERPL: 1 RATIO (ref 0–3.6)
LYMPHOCYTES # BLD AUTO: 1.9 X10E3/UL (ref 0.7–3.1)
LYMPHOCYTES NFR BLD AUTO: 19 %
MCH RBC QN AUTO: 29 PG (ref 26.6–33)
MCHC RBC AUTO-ENTMCNC: 33.1 G/DL (ref 31.5–35.7)
MCV RBC AUTO: 88 FL (ref 79–97)
MONOCYTES # BLD AUTO: 0.8 X10E3/UL (ref 0.1–0.9)
MONOCYTES NFR BLD AUTO: 8 %
NEUTROPHILS # BLD AUTO: 6.6 X10E3/UL (ref 1.4–7)
NEUTROPHILS NFR BLD AUTO: 67 %
PLATELET # BLD AUTO: 234 X10E3/UL (ref 150–450)
POTASSIUM SERPL-SCNC: 4.8 MMOL/L (ref 3.5–5.2)
PROT SERPL-MCNC: 6.3 G/DL (ref 6–8.5)
RBC # BLD AUTO: 4.65 X10E6/UL (ref 4.14–5.8)
SODIUM SERPL-SCNC: 143 MMOL/L (ref 134–144)
TRIGL SERPL-MCNC: 70 MG/DL (ref 0–149)
VLDLC SERPL CALC-MCNC: 15 MG/DL (ref 5–40)
WBC # BLD AUTO: 9.8 X10E3/UL (ref 3.4–10.8)

## 2022-04-18 DIAGNOSIS — E11.65 TYPE 2 DIABETES MELLITUS WITH HYPERGLYCEMIA, WITHOUT LONG-TERM CURRENT USE OF INSULIN: Primary | ICD-10-CM

## 2022-04-18 DIAGNOSIS — E11.9 TYPE 2 DIABETES MELLITUS WITHOUT COMPLICATION, WITHOUT LONG-TERM CURRENT USE OF INSULIN: ICD-10-CM

## 2022-04-20 ENCOUNTER — TELEPHONE (OUTPATIENT)
Dept: INTERNAL MEDICINE | Facility: CLINIC | Age: 87
End: 2022-04-20

## 2022-04-20 NOTE — TELEPHONE ENCOUNTER
----- Message from Oren Sahni MD sent at 4/18/2022  6:38 AM EDT -----  Please call the patient as I am making medication changes.  Also sending through [employer] so he has a reference.    Your A1c increased to 7.3 which is out of goal for your age and especially your cardiac problems.  I want you to increase your metformin again to 2 of the 500s twice daily and I am going to add Januvia 100 mg to your diabetic regimen.  I would like you to come in for a A1c lab appointment no sooner than July 18 and then a follow-up appointment about a week later.  No need to fast or do anything special for the blood work.  Just getting an A1c.  Blood count and cholesterol were fine.

## 2022-04-27 RX ORDER — CLOPIDOGREL BISULFATE 75 MG/1
TABLET ORAL
Qty: 90 TABLET | Refills: 3 | Status: SHIPPED | OUTPATIENT
Start: 2022-04-27

## 2022-05-18 RX ORDER — LOSARTAN POTASSIUM 25 MG/1
TABLET ORAL
Qty: 90 TABLET | Refills: 3 | Status: SHIPPED | OUTPATIENT
Start: 2022-05-18

## 2022-06-16 ENCOUNTER — OFFICE VISIT (OUTPATIENT)
Dept: CARDIOLOGY | Facility: CLINIC | Age: 87
End: 2022-06-16

## 2022-06-16 VITALS
HEIGHT: 69 IN | DIASTOLIC BLOOD PRESSURE: 70 MMHG | HEART RATE: 67 BPM | BODY MASS INDEX: 34.33 KG/M2 | WEIGHT: 231.8 LBS | SYSTOLIC BLOOD PRESSURE: 124 MMHG

## 2022-06-16 DIAGNOSIS — E78.01 FAMILIAL HYPERCHOLESTEROLEMIA: ICD-10-CM

## 2022-06-16 DIAGNOSIS — I25.10 CORONARY ARTERY DISEASE INVOLVING NATIVE CORONARY ARTERY OF NATIVE HEART WITHOUT ANGINA PECTORIS: Primary | ICD-10-CM

## 2022-06-16 DIAGNOSIS — I10 ESSENTIAL HYPERTENSION: ICD-10-CM

## 2022-06-16 PROCEDURE — 99214 OFFICE O/P EST MOD 30 MIN: CPT | Performed by: INTERNAL MEDICINE

## 2022-06-16 PROCEDURE — 93000 ELECTROCARDIOGRAM COMPLETE: CPT | Performed by: INTERNAL MEDICINE

## 2022-06-16 NOTE — PROGRESS NOTES
[facility] Follow Up Office Note     Encounter Date:22  Patient:Raymundo Lopez  :1935  MRN:[id number]      Chief Complaint: Follow up on CAD    Chief Complaint   Patient presents with   • Coronary Artery Disease     6 month f/u     History of Presenting Illness:      Mr. Lopez is a 87 y.o. gentleman with past medical history notable for coronary artery disease status post balloon angioplasty of a diagonal branch in the setting of an acute myocardial infarction, hypertension, mixed hyperlipidemia, and chronic lung disease who presents to our office for scheduled follow up.  Overall patient is doing great and has no new complaints.      Review of Systems:  Review of Systems   Constitutional: Negative.   HENT: Negative.    Eyes: Negative.    Cardiovascular: Negative.    Respiratory: Positive for wheezing.    Endocrine: Negative.    Hematologic/Lymphatic: Negative.    Skin: Negative.    Musculoskeletal: Negative.    Gastrointestinal: Negative.    Genitourinary: Negative.    Neurological: Negative.    Psychiatric/Behavioral: Negative.    Allergic/Immunologic: Negative.        Current Outpatient Medications on File Prior to Visit   Medication Sig Dispense Refill   • albuterol sulfate  (90 Base) MCG/ACT inhaler Inhale 2 puffs Every 4 (Four) Hours As Needed for Wheezing. As needes as a rescue inhaler for asthma     • aspirin 81 MG tablet Take 1 tablet by mouth 2 (two) times a day.     • atorvastatin (LIPITOR) 20 MG tablet TAKE 1 TABLET BY MOUTH EVERYDAY AT BEDTIME 90 tablet 3   • clopidogrel (PLAVIX) 75 MG tablet TAKE 1 TABLET BY MOUTH EVERY DAY 90 tablet 3   • losartan (COZAAR) 25 MG tablet TAKE 1 TABLET BY MOUTH EVERY DAY (Patient taking differently: Take 10 mg by mouth 2 (Two) Times a Day.) 90 tablet 3   • metFORMIN (GLUCOPHAGE) 500 MG tablet Take 2 tablets by mouth 2 (Two) Times a Day With Meals. 360 tablet 4   • metoprolol tartrate (LOPRESSOR) 100 MG tablet TAKE 1 TABLET BY MOUTH TWICE A  DAY (Patient taking differently: Take 100 mg by mouth Daily.) 180 tablet 3   • montelukast (SINGULAIR) 10 MG tablet Take 1 tablet by mouth daily.     • nitroglycerin (Nitrostat) 0.4 MG SL tablet Place 1 tablet under the tongue Every 5 (Five) Minutes As Needed for Chest Pain. Take no more than 3 doses in 15 minutes. 25 tablet 3   • omeprazole (priLOSEC) 20 MG capsule Take 20 mg by mouth Daily.     • SITagliptin (Januvia) 100 MG tablet Take 1 tablet by mouth Daily. 90 tablet 3   • Trelegy Ellipta 100-62.5-25 MCG/INH inhaler        No current facility-administered medications on file prior to visit.       No Known Allergies    Past Medical History:   Diagnosis Date   • Angina pectoris (HCC)    • Arthritis    • Asthma    • Garcia esophagus    • CAD (coronary artery disease)    • Cancer (HCC)     prostate   • Cataract    • Emphysema of lung (HCC)    • H/O electrocardiogram    • Health care maintenance    • History of EKG 06/17/2015   • Hyperlipidemia    • Hypertension    • Myocardial infarction (HCC)    • Shortness of breath        Past Surgical History:   Procedure Laterality Date   • ANAL FISTULA REPAIR     • COLONOSCOPY N/A 3/29/2017    Procedure: COLONOSCOPY TO CECUM & T.I. WITH COLD POLYPECTOMIES,  HOT SNARE POLYPECTOMIES;  Surgeon: Oren HARPER MD;  Location: [facility] ENDOSCOPY;  Service:    • ENDOSCOPY N/A 11/29/2016    Procedure: ESOPHAGOGASTRODUODENOSCOPY WITH BIOPSIES;  Surgeon: Oren HARPER MD;  Location: [facility] ENDOSCOPY;  Service:    • PROSTATECTOMY         Social History     Socioeconomic History   • Marital status:    Tobacco Use   • Smoking status: Former Smoker   • Smokeless tobacco: Never Used   Vaping Use   • Vaping Use: Never used   Substance and Sexual Activity   • Alcohol use: No   • Drug use: No   • Sexual activity: Defer       History reviewed. No pertinent family history.    The following portions of the patient's history were reviewed and updated as appropriate: allergies,  "current medications, past family history, past medical history, past social history, past surgical history and problem list.       Objective:       Vitals:    06/16/22 1136   BP: 124/70   BP Location: Left arm   Patient Position: Sitting   Pulse: 67   Weight: 105 kg (231 lb 12.8 oz)   Height: 175.3 cm (69\")     Body mass index is 34.23 kg/m².     Physical Exam:  Constitutional: Well appearing, well developed, no acute distress   HENT: Oropharynx clear and membrane moist  Eyes: Normal conjunctiva, no sclera icterus.  Neck: Supple, no carotid bruit bilaterally.  Cardiovascular: Regular rate and rhythm, no murmur, no lower extremity edema.  Pulmonary: Normal respiratory effort, no lung sounds, no wheezing.  Abdominal: Soft, nontender, no hepatosplenomegaly, liver is non-pulsatile.  Neurological: Alert and orient x 3.   Skin: Warm, dry, no ecchymosis, no rash.  Psych: Appropriate mood and affect. Normal judgment and insight.       Lab Results   Component Value Date    GLUCOSE 113 (H) 04/15/2022    BUN 23 04/15/2022    CREATININE 0.99 04/15/2022    EGFRIFNONA 76 10/05/2021    EGFRIFAFRI 93 10/05/2021    BCR 23 04/15/2022    K 4.8 04/15/2022    CO2 22 04/15/2022    CALCIUM 9.2 04/15/2022    PROTENTOTREF 6.3 04/15/2022    ALBUMIN 3.8 04/15/2022    LABIL2 1.5 04/15/2022    AST 17 04/15/2022    ALT 14 04/15/2022       Lab Results   Component Value Date    WBC 9.8 04/15/2022    HGB 13.5 04/15/2022    HCT 40.8 04/15/2022    MCV 88 04/15/2022     04/15/2022       No results found for: CKTOTAL, CKMB, CKMBINDEX, TROPONINI, TROPONINT    No results found for: PROBNP    Lab Results   Component Value Date    CHOL 125 10/09/2019    CHLPL 117 04/15/2022    CHLPL 128 12/10/2020    CHLPL 122 06/09/2020     Lab Results   Component Value Date    TRIG 70 04/15/2022    TRIG 185 (H) 12/10/2020    TRIG 116 06/09/2020     Lab Results   Component Value Date    HDL 50 04/15/2022    HDL 55 12/10/2020    HDL 52 06/09/2020     Lab Results "   Component Value Date    LDL 52 04/15/2022    LDL 43 12/10/2020    LDL 47 06/09/2020       Lab Results   Component Value Date    TSH 1.500 06/09/2020           ECG 12 Lead    Date/Time: 6/16/2022 12:32 PM  Performed by: Jax Loomis MD  Authorized by: Jax Loomis MD   Comparison: compared with previous ECG from 12/7/2021  Similar to previous ECG  Rhythm: sinus rhythm  Ectopy: atrial premature contractions        Stress MPI 4/30/2021:  · There is a very small, medium intensity reversible defect in the distal inferior wall that may represent ischemia. The study was technically difficult due to profound intense subdiaphragmatic GI uptake of the radiotracer.  · Left ventricular ejection fraction is hyperdynamic (Calculated EF > 70%).        Assessment:          Diagnosis Plan   1. Coronary artery disease involving native coronary artery of native heart without angina pectoris  ECG 12 Lead   2. Familial hypercholesterolemia     3. Essential hypertension            Plan:       Mr. Lopez is a 87 y.o. gentleman with past medical history notable for coronary artery disease status post balloon angioplasty of a diagonal branch in the setting of an acute myocardial infarction, hypertension, mixed hyperlipidemia, and chronic lung disease who presents to our office for scheduled follow-up.  Overall patient is doing better great.  No new symptoms and we will see back in 6 months    Coronary artery disease with angina:  · Continue aspirin and plavix  · Continue metoprolol 100 mg  · Continue statin    Hypertension:  · Well controlled    Mixed Hyperlipidemia:  · Continue statin   · Lipid panel 4/2022 demonstrates normal well controlled LDL and Total Cholesterol  · CMP 4/2022 normal ALT and AST    Follow up:  6 months      Jax Loomis MD  [facility]  06/16/22  12:33 EDT

## 2022-07-25 ENCOUNTER — OFFICE VISIT (OUTPATIENT)
Dept: INTERNAL MEDICINE | Facility: CLINIC | Age: 87
End: 2022-07-25

## 2022-07-25 VITALS
OXYGEN SATURATION: 93 % | SYSTOLIC BLOOD PRESSURE: 126 MMHG | TEMPERATURE: 98 F | DIASTOLIC BLOOD PRESSURE: 64 MMHG | HEART RATE: 63 BPM | HEIGHT: 69 IN | BODY MASS INDEX: 34.21 KG/M2 | WEIGHT: 231 LBS

## 2022-07-25 DIAGNOSIS — K59.03 DRUG-INDUCED CONSTIPATION: ICD-10-CM

## 2022-07-25 DIAGNOSIS — I10 ESSENTIAL HYPERTENSION: Chronic | ICD-10-CM

## 2022-07-25 DIAGNOSIS — E11.65 TYPE 2 DIABETES MELLITUS WITH HYPERGLYCEMIA, WITHOUT LONG-TERM CURRENT USE OF INSULIN: Primary | ICD-10-CM

## 2022-07-25 PROCEDURE — 99214 OFFICE O/P EST MOD 30 MIN: CPT | Performed by: FAMILY MEDICINE

## 2022-07-25 NOTE — PROGRESS NOTES
"Chief Complaint  Hypertension (Follow up)    Subjective        Raymundo Lopez presents to [facility]  History of Present Illness     He is following up today regarding his diabetes.  His A1c looks much better at 6.6 down from 7.3.  He is currently taking metformin and Januvia.  Trying to eat a healthy diabetic diet.  Avoiding starches, breads as possible.  He would like to change something as his Januvia is causing some constipation.    He will be due for his next checkup in November.    HTN is stable - BB and ARB therapy are in place and he is taking the medication as prescribed.  No angina symptoms      Objective   Vital Signs:  /64 (BP Location: Right arm, Patient Position: Sitting, Cuff Size: Adult)   Pulse 63   Temp 98 °F (36.7 °C) (Temporal)   Ht 175.3 cm (69.02\")   Wt 105 kg (231 lb)   SpO2 93%   BMI 34.10 kg/m²   Estimated body mass index is 34.1 kg/m² as calculated from the following:    Height as of this encounter: 175.3 cm (69.02\").    Weight as of this encounter: 105 kg (231 lb).          Physical Exam  Vitals and nursing note reviewed.   Constitutional:       General: He is not in acute distress.     Appearance: Normal appearance.   Cardiovascular:      Rate and Rhythm: Normal rate and regular rhythm.      Heart sounds: Normal heart sounds. No murmur heard.  Pulmonary:      Effort: Pulmonary effort is normal.      Breath sounds: Normal breath sounds.   Neurological:      Mental Status: He is alert.        Result Review :  The following data was reviewed by: Oren Sahni MD on 07/25/2022:  Common labs    Common Labsle 10/5/21 10/5/21 4/15/22 4/15/22 4/15/22 4/15/22 7/18/22    1232 1232 1217 1217 1217 1217    Glucose  112 (A)  113 (A)      BUN  20  23      Creatinine  0.94  0.99      eGFR Non  Am  76        eGFR African Am  93        Sodium  142  143      Potassium  5.0  4.8      Chloride  105  106      Calcium  9.4  9.2      Total Protein  6.6  6.3    "   Albumin  4.30  3.8      Total Bilirubin  0.3  0.4      Alkaline Phosphatase  102  102      AST (SGOT)  20  17      ALT (SGPT)  17  14      WBC   9.8       Hemoglobin   13.5       Hematocrit   40.8       Platelets   234       Total Cholesterol      117    Triglycerides      70    HDL Cholesterol      50    LDL Cholesterol       52    Hemoglobin A1C 7.20 (A)    7.3 (A)  6.6 (A)   (A) Abnormal value       Comments are available for some flowsheets but are not being displayed.                     Assessment and Plan   Diagnoses and all orders for this visit:    1. Type 2 diabetes mellitus with hyperglycemia, without long-term current use of insulin (HCC) (Primary)  -     SITagliptin (Januvia) 100 MG tablet; Take 0.5 tablets by mouth Daily.  Dispense: 45 tablet; Refill: 4    2. Essential hypertension    3. Drug-induced constipation      Stable Diabetes.  Will have him continue metformin and cut down dose of Januvia to 50mg as his A1c is doing so well.  Hopefully this will fix the constipation issue.  Blood pressure is excellent and should continue current med regimen.             Follow Up   Return in about 6 months (around 1/26/2023) for Medicare Wellness.  Patient was given instructions and counseling regarding his condition or for health maintenance advice. Please see specific information pulled into the AVS if appropriate.

## 2022-10-13 RX ORDER — ATORVASTATIN CALCIUM 20 MG/1
TABLET, FILM COATED ORAL
Qty: 90 TABLET | Refills: 1 | Status: SHIPPED | OUTPATIENT
Start: 2022-10-13

## 2022-12-28 ENCOUNTER — OFFICE VISIT (OUTPATIENT)
Dept: CARDIOLOGY | Facility: CLINIC | Age: 87
End: 2022-12-28

## 2022-12-28 VITALS
DIASTOLIC BLOOD PRESSURE: 70 MMHG | HEIGHT: 69 IN | WEIGHT: 236 LBS | HEART RATE: 68 BPM | SYSTOLIC BLOOD PRESSURE: 132 MMHG | BODY MASS INDEX: 34.96 KG/M2

## 2022-12-28 DIAGNOSIS — I25.10 CORONARY ARTERY DISEASE INVOLVING NATIVE CORONARY ARTERY OF NATIVE HEART WITHOUT ANGINA PECTORIS: Primary | Chronic | ICD-10-CM

## 2022-12-28 DIAGNOSIS — I10 ESSENTIAL HYPERTENSION: Chronic | ICD-10-CM

## 2022-12-28 DIAGNOSIS — E78.01 FAMILIAL HYPERCHOLESTEROLEMIA: Chronic | ICD-10-CM

## 2022-12-28 PROCEDURE — 99214 OFFICE O/P EST MOD 30 MIN: CPT | Performed by: INTERNAL MEDICINE

## 2022-12-28 PROCEDURE — 93000 ELECTROCARDIOGRAM COMPLETE: CPT | Performed by: INTERNAL MEDICINE

## 2022-12-28 NOTE — PROGRESS NOTES
[facility] Follow Up Office Note     Encounter Date:22  Patient:Raymundo Lopez  :1935  MRN:[id number]      Chief Complaint: Follow up on CAD    Chief Complaint   Patient presents with   • Coronary Artery Disease     6 month f/u     History of Presenting Illness:      Mr. Lopez is a 87 y.o. gentleman with past medical history notable for coronary artery disease status post balloon angioplasty of a diagonal branch in the setting of an acute myocardial infarction, hypertension, mixed hyperlipidemia, and chronic lung disease who presents to our office for scheduled follow up.  Overall continues to do great from a cardiac perspective.  No new complaints        Review of Systems:  Review of Systems   Constitutional: Negative.   HENT: Negative.    Eyes: Negative.    Cardiovascular: Negative.    Respiratory: Positive for wheezing.    Endocrine: Negative.    Hematologic/Lymphatic: Negative.    Skin: Negative.    Musculoskeletal: Negative.    Gastrointestinal: Negative.    Genitourinary: Negative.    Neurological: Negative.    Psychiatric/Behavioral: Negative.    Allergic/Immunologic: Negative.        Current Outpatient Medications on File Prior to Visit   Medication Sig Dispense Refill   • albuterol sulfate  (90 Base) MCG/ACT inhaler Inhale 2 puffs Every 4 (Four) Hours As Needed for Wheezing. As needes as a rescue inhaler for asthma     • aspirin 81 MG tablet Take 1 tablet by mouth 2 (two) times a day.     • atorvastatin (LIPITOR) 20 MG tablet TAKE 1 TABLET BY MOUTH EVERYDAY AT BEDTIME 90 tablet 1   • clopidogrel (PLAVIX) 75 MG tablet TAKE 1 TABLET BY MOUTH EVERY DAY 90 tablet 3   • losartan (COZAAR) 25 MG tablet TAKE 1 TABLET BY MOUTH EVERY DAY (Patient taking differently: Take 10 mg by mouth 2 (Two) Times a Day.) 90 tablet 3   • metFORMIN (GLUCOPHAGE) 500 MG tablet Take 2 tablets by mouth 2 (Two) Times a Day With Meals. 360 tablet 4   • metoprolol tartrate (LOPRESSOR) 100 MG tablet TAKE 1  TABLET BY MOUTH TWICE A DAY (Patient taking differently: Take 100 mg by mouth Daily.) 180 tablet 3   • montelukast (SINGULAIR) 10 MG tablet Take 1 tablet by mouth daily.     • nitroglycerin (Nitrostat) 0.4 MG SL tablet Place 1 tablet under the tongue Every 5 (Five) Minutes As Needed for Chest Pain. Take no more than 3 doses in 15 minutes. 25 tablet 3   • omeprazole (priLOSEC) 20 MG capsule Take 20 mg by mouth Daily.     • SITagliptin (Januvia) 100 MG tablet Take 0.5 tablets by mouth Daily. 45 tablet 4   • Trelegy Ellipta 100-62.5-25 MCG/INH inhaler        No current facility-administered medications on file prior to visit.       No Known Allergies    Past Medical History:   Diagnosis Date   • Angina pectoris (HCC)    • Arthritis    • Asthma    • Garcia esophagus    • CAD (coronary artery disease)    • Cancer (HCC)     prostate   • Cataract    • Emphysema of lung (HCC)    • H/O electrocardiogram    • Health care maintenance    • History of EKG 06/17/2015   • Hyperlipidemia    • Hypertension    • Myocardial infarction (HCC)    • Shortness of breath        Past Surgical History:   Procedure Laterality Date   • ANAL FISTULA REPAIR     • COLONOSCOPY N/A 3/29/2017    Procedure: COLONOSCOPY TO CECUM & T.I. WITH COLD POLYPECTOMIES,  HOT SNARE POLYPECTOMIES;  Surgeon: Oren HARPER MD;  Location: [facility] ENDOSCOPY;  Service:    • ENDOSCOPY N/A 11/29/2016    Procedure: ESOPHAGOGASTRODUODENOSCOPY WITH BIOPSIES;  Surgeon: Oren HARPER MD;  Location: [facility] ENDOSCOPY;  Service:    • PROSTATECTOMY         Social History     Socioeconomic History   • Marital status:    Tobacco Use   • Smoking status: Former   • Smokeless tobacco: Never   Vaping Use   • Vaping Use: Never used   Substance and Sexual Activity   • Alcohol use: No   • Drug use: No   • Sexual activity: Defer       History reviewed. No pertinent family history.    The following portions of the patient's history were reviewed and updated as  "appropriate: allergies, current medications, past family history, past medical history, past social history, past surgical history and problem list.       Objective:       Vitals:    12/28/22 1045   BP: 132/70   BP Location: Left arm   Patient Position: Sitting   Pulse: 68   Weight: 107 kg (236 lb)   Height: 175.3 cm (69.02\")     Body mass index is 34.83 kg/m².     Physical Exam:  Constitutional: Well appearing, well developed, no acute distress   HENT: Oropharynx clear and membrane moist  Eyes: Normal conjunctiva, no sclera icterus.  Neck: Supple, no carotid bruit bilaterally.  Cardiovascular: Regular rate and rhythm, no murmur, no lower extremity edema.  Pulmonary: Normal respiratory effort, no lung sounds, no wheezing.  Abdominal: Soft, nontender, no hepatosplenomegaly, liver is non-pulsatile.  Neurological: Alert and orient x 3.   Skin: Warm, dry, no ecchymosis, no rash.  Psych: Appropriate mood and affect. Normal judgment and insight.       Lab Results   Component Value Date    GLUCOSE 113 (H) 04/15/2022    BUN 23 04/15/2022    CREATININE 0.99 04/15/2022    EGFRIFNONA 76 10/05/2021    EGFRIFAFRI 93 10/05/2021    BCR 23 04/15/2022    K 4.8 04/15/2022    CO2 22 04/15/2022    CALCIUM 9.2 04/15/2022    PROTENTOTREF 6.3 04/15/2022    ALBUMIN 3.8 04/15/2022    LABIL2 1.5 04/15/2022    AST 17 04/15/2022    ALT 14 04/15/2022       Lab Results   Component Value Date    WBC 9.8 04/15/2022    HGB 13.5 04/15/2022    HCT 40.8 04/15/2022    MCV 88 04/15/2022     04/15/2022       No results found for: CKTOTAL, CKMB, CKMBINDEX, TROPONINI, TROPONINT    No results found for: PROBNP    Lab Results   Component Value Date    CHOL 125 10/09/2019    CHLPL 117 04/15/2022    CHLPL 128 12/10/2020    CHLPL 122 06/09/2020     Lab Results   Component Value Date    TRIG 70 04/15/2022    TRIG 185 (H) 12/10/2020    TRIG 116 06/09/2020     Lab Results   Component Value Date    HDL 50 04/15/2022    HDL 55 12/10/2020    HDL 52 06/09/2020 "     Lab Results   Component Value Date    LDL 52 04/15/2022    LDL 43 12/10/2020    LDL 47 06/09/2020       Lab Results   Component Value Date    TSH 1.500 06/09/2020           ECG 12 Lead    Date/Time: 12/28/2022 11:12 AM  Performed by: Jax Loomis MD  Authorized by: Jax Loomis MD   Comparison: compared with previous ECG from 6/16/2022  Similar to previous ECG  Rhythm: sinus rhythm        Stress MPI 4/30/2021:  · There is a very small, medium intensity reversible defect in the distal inferior wall that may represent ischemia. The study was technically difficult due to profound intense subdiaphragmatic GI uptake of the radiotracer.  · Left ventricular ejection fraction is hyperdynamic (Calculated EF > 70%).        Assessment:          Diagnosis Plan   1. Coronary artery disease involving native coronary artery of native heart without angina pectoris  ECG 12 Lead      2. Essential hypertension        3. Familial hypercholesterolemia               Plan:       Mr. Lopez is a 87 y.o. gentleman with past medical history notable for coronary artery disease status post balloon angioplasty of a diagonal branch in the setting of an acute myocardial infarction, hypertension, mixed hyperlipidemia, and chronic lung disease who presents to our office for scheduled follow-up.  Overall he is doing great.  No changes are needed this time.  Plan on seeing back in 1 year    Coronary artery disease with angina:  · Continue aspirin and plavix  · Continue metoprolol 100 mg  · Continue statin    Hypertension:  · Well controlled    Mixed Hyperlipidemia:  · Continue statin   · Lipid panel 4/2022 demonstrates normal well controlled LDL and Total Cholesterol  · CMP 4/2022 normal ALT and AST    Follow up:  12 months      Jax Loomis MD  [facility]  12/28/22  11:13 EST

## 2023-01-09 ENCOUNTER — OFFICE VISIT (OUTPATIENT)
Dept: INTERNAL MEDICINE | Facility: CLINIC | Age: 88
End: 2023-01-09
Payer: MEDICARE

## 2023-01-09 VITALS
HEART RATE: 61 BPM | SYSTOLIC BLOOD PRESSURE: 130 MMHG | RESPIRATION RATE: 19 BRPM | WEIGHT: 236 LBS | DIASTOLIC BLOOD PRESSURE: 70 MMHG | BODY MASS INDEX: 34.96 KG/M2 | TEMPERATURE: 98.6 F | OXYGEN SATURATION: 95 % | HEIGHT: 69 IN

## 2023-01-09 DIAGNOSIS — Z00.00 MEDICARE ANNUAL WELLNESS VISIT, SUBSEQUENT: Primary | ICD-10-CM

## 2023-01-09 DIAGNOSIS — E11.65 TYPE 2 DIABETES MELLITUS WITH HYPERGLYCEMIA, WITHOUT LONG-TERM CURRENT USE OF INSULIN: Chronic | ICD-10-CM

## 2023-01-09 LAB — HBA1C MFR BLD: 6.3 % (ref 4.8–5.6)

## 2023-01-09 PROCEDURE — G0439 PPPS, SUBSEQ VISIT: HCPCS | Performed by: FAMILY MEDICINE

## 2023-01-09 PROCEDURE — 1170F FXNL STATUS ASSESSED: CPT | Performed by: FAMILY MEDICINE

## 2023-01-09 PROCEDURE — 1126F AMNT PAIN NOTED NONE PRSNT: CPT | Performed by: FAMILY MEDICINE

## 2023-01-09 PROCEDURE — 1159F MED LIST DOCD IN RCRD: CPT | Performed by: FAMILY MEDICINE

## 2023-01-09 PROCEDURE — 99213 OFFICE O/P EST LOW 20 MIN: CPT | Performed by: FAMILY MEDICINE

## 2023-01-09 NOTE — PROGRESS NOTES
The ABCs of the Annual Wellness Visit  Subsequent Medicare Wellness Visit    Subjective    Raymundo Lopez is a 87 y.o. male who presents for a Subsequent Medicare Wellness Visit.    The following portions of the patient's history were reviewed and   updated as appropriate: allergies, current medications, past family history, past medical history, past social history, past surgical history and problem list.    Compared to one year ago, the patient feels his physical   health is the same.    Compared to one year ago, the patient feels his mental   health is the same.    Recent Hospitalizations:  He was not admitted to the hospital during the last year.       Current Medical Providers:  Patient Care Team:  Oren Sahni MD as PCP - General (Family Medicine)    Outpatient Medications Prior to Visit   Medication Sig Dispense Refill   • albuterol sulfate  (90 Base) MCG/ACT inhaler Inhale 2 puffs Every 4 (Four) Hours As Needed for Wheezing. As needes as a rescue inhaler for asthma     • aspirin 81 MG tablet Take 1 tablet by mouth 2 (two) times a day.     • atorvastatin (LIPITOR) 20 MG tablet TAKE 1 TABLET BY MOUTH EVERYDAY AT BEDTIME 90 tablet 1   • clopidogrel (PLAVIX) 75 MG tablet TAKE 1 TABLET BY MOUTH EVERY DAY 90 tablet 3   • losartan (COZAAR) 25 MG tablet TAKE 1 TABLET BY MOUTH EVERY DAY (Patient taking differently: Take 10 mg by mouth 2 (Two) Times a Day.) 90 tablet 3   • metFORMIN (GLUCOPHAGE) 500 MG tablet Take 2 tablets by mouth 2 (Two) Times a Day With Meals. 360 tablet 4   • metoprolol tartrate (LOPRESSOR) 100 MG tablet TAKE 1 TABLET BY MOUTH TWICE A DAY (Patient taking differently: Take 100 mg by mouth Daily.) 180 tablet 3   • montelukast (SINGULAIR) 10 MG tablet Take 1 tablet by mouth daily.     • omeprazole (priLOSEC) 20 MG capsule Take 20 mg by mouth Daily.     • SITagliptin (Januvia) 100 MG tablet Take 0.5 tablets by mouth Daily. 45 tablet 4   • Trelegy Ellipta 100-62.5-25 MCG/INH inhaler      •  nitroglycerin (Nitrostat) 0.4 MG SL tablet Place 1 tablet under the tongue Every 5 (Five) Minutes As Needed for Chest Pain. Take no more than 3 doses in 15 minutes. 25 tablet 3     No facility-administered medications prior to visit.       No opioid medication identified on active medication list. I have reviewed chart for other potential  high risk medication/s and harmful drug interactions in the elderly.          Aspirin is on active medication list. Aspirin use is indicated based on review of current medical condition/s. Pros and cons of this therapy have been discussed today. Benefits of this medication outweigh potential harm.  Patient has been encouraged to continue taking this medication.  .      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  ACP discussion was held with the patient during this visit. Patient has an advance directive (not in EMR), copy requested.     Objective    Vitals:    01/09/23 1252   BP: 130/70   BP Location: Left arm   Patient Position: Sitting   Cuff Size: Small Adult   Pulse: 61   Resp: 19   Temp: 98.6 °F (37 °C)   SpO2: 95%   Weight: 107 kg (236 lb)   Height: 175.3 cm (69\")   PainSc: 0-No pain     Estimated body mass index is 34.85 kg/m² as calculated from the following:    Height as of this encounter: 175.3 cm (69\").    Weight as of this encounter: 107 kg (236 lb).    BMI is >= 30 and <35. (Class 1 Obesity). The following options were offered after  discussion;: nutrition counseling/recommendations      Does the patient have evidence of cognitive impairment? No          HEALTH RISK ASSESSMENT    Smoking Status:  Social History     Tobacco Use   Smoking Status Former   Smokeless Tobacco Never     Alcohol Consumption:  Social History     Substance and Sexual Activity   Alcohol Use No     Fall Risk Screen:    TERESA Fall Risk Assessment was completed, and patient is at LOW risk for falls.Assessment completed on:1/9/2023    Depression Screening:  PHQ-2/PHQ-9 Depression Screening 1/9/2023   Little Interest or Pleasure in Doing Things 0-->not at all   Feeling Down, Depressed or Hopeless 0-->not at all   PHQ-9: Brief Depression Severity Measure Score 0       Health Habits and Functional and Cognitive Screening:  Functional & Cognitive Status 1/9/2023   Do you have difficulty preparing food and eating? No   Do you have difficulty bathing yourself, getting dressed or grooming yourself? No   Do you have difficulty using the toilet? No   Do you have difficulty moving around from place to place? No   Do you have trouble with steps or getting out of a bed or a chair? No   Current Diet Well Balanced Diet   Dental Exam Up to date   Eye Exam Not up to date   Exercise (times per week) 0 times per week   Current Exercises Include No Regular Exercise   Do you need help using the phone?  No   Are you deaf or do you have serious difficulty hearing?  Yes   Do you need help with transportation? No   Do you need help shopping? No   Do you need help preparing meals?  No   Do you need help with housework?  No   Do you need help with laundry? No   Do you need help taking your medications? No   Do you need help managing money? No   Do you ever drive or ride in a car without wearing a seat belt? No   Have you felt unusual stress, anger or loneliness in the last month? No   Who do you live with? Alone   If you need help, do you have trouble finding someone available to you? No   Have you  been bothered in the last four weeks by sexual problems? No   Do you have difficulty concentrating, remembering or making decisions? No       Age-appropriate Screening Schedule:  Refer to the list below for future screening recommendations based on patient's age, sex and/or medical conditions. Orders for these recommended tests are listed in the plan section. The patient has been provided with a written plan.    Health Maintenance   Topic Date Due   • DIABETIC EYE EXAM  Never done   • URINE MICROALBUMIN  06/29/2022   • HEMOGLOBIN A1C  01/18/2023   • LIPID PANEL  04/15/2023   • TDAP/TD VACCINES (2 - Td or Tdap) 10/17/2027   • INFLUENZA VACCINE  Completed   • ZOSTER VACCINE  Completed                CMS Preventative Services Quick Reference  Risk Factors Identified During Encounter  Immunizations Discussed/Encouraged: Influenza, Prevnar 20 (Pneumococcal 20-valent conjugate), Shingrix and COVID19  The above risks/problems have been discussed with the patient.  Pertinent information has been shared with the patient in the After Visit Summary.  An After Visit Summary and PPPS were made available to the patient.    Follow Up:   Next Medicare Wellness visit to be scheduled in 1 year.       Additional E&M Note during same encounter follows:  Patient has multiple medical problems which are significant and separately identifiable that require additional work above and beyond the Medicare Wellness Visit.      Chief Complaint  Medicare Wellness-subsequent (Diabetes)    Subjective        HPI  Raymundo Lopez is also being seen today for chronic management of diabetes mellitus.    Diabetes mellitus-  Patient is taking metformin, Januvia 100 mg daily as prescribed.  No side effects.  He has been taking 2 of the metformin daily as the pills do not taste well.  He did eat some good stuff during the holidays.  No new issues such as numbness or tingling that is worsening.  Due for recheck A1c today.  His A1c had been trending down  through last year.  Goal for him would be A1c around 6.5 but at least under 7.         Objective   Vital Signs:  /70 (BP Location: Left arm, Patient Position: Sitting, Cuff Size: Small Adult)   Pulse 61   Temp 98.6 °F (37 °C)   Resp 19   Ht 175.3 cm (69\")   Wt 107 kg (236 lb)   SpO2 95%   BMI 34.85 kg/m²     Physical Exam  Vitals and nursing note reviewed.   Constitutional:       General: He is not in acute distress.     Appearance: Normal appearance.   Cardiovascular:      Rate and Rhythm: Normal rate and regular rhythm.      Heart sounds: Normal heart sounds. No murmur heard.  Pulmonary:      Effort: Pulmonary effort is normal.      Breath sounds: Normal breath sounds.   Neurological:      Mental Status: He is alert.          The following data was reviewed by: Oren Sahni MD on 01/09/2023:  Common labs    Common Labs 4/15/22 4/15/22 4/15/22 4/15/22 7/18/22    1217 1217 1217 1217    Glucose  113 (A)      BUN  23      Creatinine  0.99      Sodium  143      Potassium  4.8      Chloride  106      Calcium  9.2      Total Protein  6.3      Albumin  3.8      Total Bilirubin  0.4      Alkaline Phosphatase  102      AST (SGOT)  17      ALT (SGPT)  14      WBC 9.8       Hemoglobin 13.5       Hematocrit 40.8       Platelets 234       Total Cholesterol    117    Triglycerides    70    HDL Cholesterol    50    LDL Cholesterol     52    Hemoglobin A1C   7.3 (A)  6.6 (A)   (A) Abnormal value       Comments are available for some flowsheets but are not being displayed.                      Assessment and Plan   Diagnoses and all orders for this visit:    1. Medicare annual wellness visit, subsequent (Primary)    2. Type 2 diabetes mellitus with hyperglycemia, without long-term current use of insulin (HCC)  -     Hemoglobin A1c      I will check his A1c today.  Since he did decrease his metformin and Januvia remained the same, likely his A1c will be out of goal.  If greater than 7, will need to either talk with  him about remaining on the higher dose metformin or change to a more tolerable formulation such as Janumet.  Now that we are through the holidays, hopefully he will eat better now.         Follow Up   Return in about 28 weeks (around 7/24/2023) for Next scheduled follow up - diabetes.  Patient was given instructions and counseling regarding his condition or for health maintenance advice. Please see specific information pulled into the AVS if appropriate.

## 2023-01-10 DIAGNOSIS — E11.9 TYPE 2 DIABETES MELLITUS WITHOUT COMPLICATION, WITHOUT LONG-TERM CURRENT USE OF INSULIN: ICD-10-CM

## 2023-03-30 DIAGNOSIS — E11.65 TYPE 2 DIABETES MELLITUS WITH HYPERGLYCEMIA, WITHOUT LONG-TERM CURRENT USE OF INSULIN: ICD-10-CM

## 2023-03-30 RX ORDER — SITAGLIPTIN 100 MG/1
TABLET, FILM COATED ORAL
Qty: 90 TABLET | Refills: 1 | Status: SHIPPED | OUTPATIENT
Start: 2023-03-30

## 2023-04-10 RX ORDER — ATORVASTATIN CALCIUM 20 MG/1
TABLET, FILM COATED ORAL
Qty: 90 TABLET | Refills: 3 | Status: SHIPPED | OUTPATIENT
Start: 2023-04-10

## 2023-04-10 RX ORDER — METOPROLOL TARTRATE 100 MG/1
TABLET ORAL
Qty: 180 TABLET | Refills: 1 | Status: SHIPPED | OUTPATIENT
Start: 2023-04-10

## 2023-04-26 ENCOUNTER — TELEPHONE (OUTPATIENT)
Dept: INTERNAL MEDICINE | Facility: CLINIC | Age: 88
End: 2023-04-26

## 2023-04-26 NOTE — TELEPHONE ENCOUNTER
"  Caller: Raymundo Lopez \"SHANTI\"    Relationship: Self    Best call back number: [phone]    What is the best time to reach you: ANYTIME     Who are you requesting to speak with (clinical staff, provider,  specific staff member): CLINICAL STAFF     What was the call regarding: PATIENT WOULD LIKE TO KNOW IF DR ALEXANDER THINKS THAT OZEMPIC WOULD BE AN APPROPRIATE MEDICATION FOR HIM.     PLEASE ADVISE     Do you require a callback: YES   "

## 2023-04-26 NOTE — TELEPHONE ENCOUNTER
We would have to see how his next set of labs and follow-up go.  I am not prescribing it for many patients at his age due to the potential for renal issues so it would greatly depend on his renal function, diabetic stability, etc.  We can discuss at his July appt.

## 2023-04-28 RX ORDER — LOSARTAN POTASSIUM 25 MG/1
TABLET ORAL
Qty: 90 TABLET | Refills: 1 | Status: SHIPPED | OUTPATIENT
Start: 2023-04-28

## 2023-04-28 RX ORDER — CLOPIDOGREL BISULFATE 75 MG/1
TABLET ORAL
Qty: 90 TABLET | Refills: 3 | Status: SHIPPED | OUTPATIENT
Start: 2023-04-28

## 2023-07-24 ENCOUNTER — OFFICE VISIT (OUTPATIENT)
Dept: INTERNAL MEDICINE | Facility: CLINIC | Age: 88
End: 2023-07-24
Payer: MEDICARE

## 2023-07-24 VITALS
HEART RATE: 49 BPM | OXYGEN SATURATION: 98 % | BODY MASS INDEX: 34.96 KG/M2 | SYSTOLIC BLOOD PRESSURE: 122 MMHG | RESPIRATION RATE: 18 BRPM | WEIGHT: 236 LBS | HEIGHT: 69 IN | DIASTOLIC BLOOD PRESSURE: 80 MMHG

## 2023-07-24 DIAGNOSIS — E11.65 TYPE 2 DIABETES MELLITUS WITH HYPERGLYCEMIA, WITHOUT LONG-TERM CURRENT USE OF INSULIN: ICD-10-CM

## 2023-07-24 DIAGNOSIS — I25.10 CORONARY ARTERY DISEASE INVOLVING NATIVE CORONARY ARTERY OF NATIVE HEART WITHOUT ANGINA PECTORIS: Chronic | ICD-10-CM

## 2023-07-24 DIAGNOSIS — E11.9 TYPE 2 DIABETES MELLITUS WITHOUT COMPLICATION, WITHOUT LONG-TERM CURRENT USE OF INSULIN: Primary | Chronic | ICD-10-CM

## 2023-07-24 DIAGNOSIS — E78.01 FAMILIAL HYPERCHOLESTEROLEMIA: Chronic | ICD-10-CM

## 2023-07-24 DIAGNOSIS — I10 ESSENTIAL HYPERTENSION: Chronic | ICD-10-CM

## 2023-07-24 PROBLEM — I20.9 ANGINA PECTORIS: Status: RESOLVED | Noted: 2018-10-03 | Resolved: 2023-07-24

## 2023-07-24 PROBLEM — E78.2 MIXED HYPERLIPIDEMIA: Status: RESOLVED | Noted: 2020-05-21 | Resolved: 2023-07-24

## 2023-07-24 PROCEDURE — 99214 OFFICE O/P EST MOD 30 MIN: CPT | Performed by: FAMILY MEDICINE

## 2023-07-24 PROCEDURE — 1160F RVW MEDS BY RX/DR IN RCRD: CPT | Performed by: FAMILY MEDICINE

## 2023-07-24 PROCEDURE — 1159F MED LIST DOCD IN RCRD: CPT | Performed by: FAMILY MEDICINE

## 2023-07-24 NOTE — PROGRESS NOTES
"Chief Complaint  Follow-up and Hypertension    Subjective        Raymundo Lopez presents to [facility]      Diabetes mellitus-  Patient is taking metformin, januvia as prescribed.  No side effects.  No new issues such as numbness or tingling that is worsening.  Due for recheck.  Of note he is taking 1/2 Januvia due to constipation.    Hypertension - stable today in the office.  Patient taking medication as prescribed.    Patient is taking losartan, metoprolol.  Denies side effects of the medication.    HLD-  Patient taking atorvastatin as prescribed.  Trying to adhere to a balanced diet.  Denies side effects such as myalgias.  Due for recheck.    Coronary artery disease -stable on the metoprolol, aspirin therapy, Plavix.  Blood pressure controlled.  He is following with cardiology as well.    Objective   Vital Signs:  /80 (BP Location: Left arm, Patient Position: Sitting, Cuff Size: Small Adult)   Pulse (!) 49   Resp 18   Ht 175.3 cm (69\")   Wt 107 kg (236 lb)   SpO2 98%   BMI 34.85 kg/m²   Estimated body mass index is 34.85 kg/m² as calculated from the following:    Height as of this encounter: 175.3 cm (69\").    Weight as of this encounter: 107 kg (236 lb).               Physical Exam  Vitals and nursing note reviewed.   Constitutional:       General: He is not in acute distress.     Appearance: Normal appearance.   Cardiovascular:      Rate and Rhythm: Normal rate and regular rhythm.      Heart sounds: Normal heart sounds. No murmur heard.  Pulmonary:      Effort: Pulmonary effort is normal.      Breath sounds: Normal breath sounds.   Neurological:      Mental Status: He is alert.      Result Review :  The following data was reviewed by: Oren Sahni MD on 07/24/2023:  Common labs          1/9/2023    13:44   Common Labs   Hemoglobin A1C 6.30                   Assessment and Plan   Diagnoses and all orders for this visit:    1. Type 2 diabetes mellitus without " complication, without long-term current use of insulin (Primary)  -     CBC (No Diff)  -     Comprehensive Metabolic Panel  -     Hemoglobin A1c  -     Microalbumin / Creatinine Urine Ratio - Urine, Clean Catch  -     TSH Rfx On Abnormal To Free T4    2. Essential hypertension  -     CBC (No Diff)  -     Comprehensive Metabolic Panel    3. Familial hypercholesterolemia  -     CBC (No Diff)  -     Comprehensive Metabolic Panel  -     Lipid Panel With LDL / HDL Ratio    4. Coronary artery disease involving native coronary artery of native heart without angina pectoris  -     CBC (No Diff)  -     Comprehensive Metabolic Panel  -     Lipid Panel With LDL / HDL Ratio    5. Type 2 diabetes mellitus with hyperglycemia, without long-term current use of insulin  -     SITagliptin (Januvia) 100 MG tablet; Take 0.5 tablets by mouth Daily.  Dispense: 45 tablet; Refill: 1        Clinically stable chronic conditions as above.  Continue all medications as above.  Labs reviewed/ordered as above.    Unable to correct the complications for the diabetes dx but it is no hyperglycemia or complications.               Follow Up   Return in about 18 weeks (around 11/27/2023) for Next scheduled follow up.  Patient was given instructions and counseling regarding his condition or for health maintenance advice. Please see specific information pulled into the AVS if appropriate.

## 2023-07-24 NOTE — PATIENT INSTRUCTIONS
"MyChart Tips:    MyChart is a useful part of our patient care program and is a great way for us to communicate lab results and for you to request refills.  Not all medical questions are appropriate for MyChart such as new medical issues that require taking a history, performing an exam, getting labs/studies or researching medical questions needed to be addressed in the office.    Examples of medical issues that are APPROPRIATE for MyChart:  -Follow-up on problems we have already addressed in a visit such as home testing results, blood pressure readings, glucose readings  -Questions that can be answered with a simple \"yes\" or \"no\"    Communication that is NOT APPROPRIATE for MyChart:  -MyChart is not for new problems, serious problems or urgent problems.  Urgent matters should be addressed by phone, in the office, urgent care or the ER.  -Tapestry messages are not email.  Staff will check messages each weekday.  We strive for a 48-hour turnaround on messaging.    -Q1 Labst is not for private issues.  Messages are received first by our office staff.    Please allow up to 7 business days for lab results to be sent through Tapestry to you before contacting your provider.  Sometimes we are waiting for results to get back from the lab and also your provider needs time to analyze them thoroughly before making recommendations.  While the internet has great resources, it is not a substitute for interpreting lab results.    We also ask that you not send MiNamet messages and telephone calls regarding the same issue simultaneously.  This slows down the process of returning your call/message and confuses staff.    Be mindful that Autotaskhart messages and telephone calls become part of your permanent medical record.    Lastly, your provider cannot access your Autotaskhart.  It is a service which communicates with the EHR (Electronic Health Record).  Sometimes there are errors in Tapestry that staff and providers cannot see and these errors " are not part of your EHR.  Vaccines and screening reminders have been incorrect in MyChart.    We appreciate your respect for the limitations and boundaries of LaunchBithart.

## 2023-07-25 LAB
ALBUMIN SERPL-MCNC: 4.4 G/DL (ref 3.5–5.2)
ALBUMIN/CREAT UR: 18 MG/G CREAT (ref 0–29)
ALBUMIN/GLOB SERPL: 2.2 G/DL
ALP SERPL-CCNC: 93 U/L (ref 39–117)
ALT SERPL-CCNC: 16 U/L (ref 1–41)
AST SERPL-CCNC: 16 U/L (ref 1–40)
BILIRUB SERPL-MCNC: 0.4 MG/DL (ref 0–1.2)
BUN SERPL-MCNC: 17 MG/DL (ref 8–23)
BUN/CREAT SERPL: 13.8 (ref 7–25)
CALCIUM SERPL-MCNC: 9.5 MG/DL (ref 8.6–10.5)
CHLORIDE SERPL-SCNC: 105 MMOL/L (ref 98–107)
CHOLEST SERPL-MCNC: 126 MG/DL (ref 0–200)
CO2 SERPL-SCNC: 26.4 MMOL/L (ref 22–29)
CREAT SERPL-MCNC: 1.23 MG/DL (ref 0.76–1.27)
CREAT UR-MCNC: 120.1 MG/DL
EGFRCR SERPLBLD CKD-EPI 2021: 57.2 ML/MIN/1.73
ERYTHROCYTE [DISTWIDTH] IN BLOOD BY AUTOMATED COUNT: 14 % (ref 12.3–15.4)
GLOBULIN SER CALC-MCNC: 2 GM/DL
GLUCOSE SERPL-MCNC: 110 MG/DL (ref 65–99)
HBA1C MFR BLD: 6.8 % (ref 4.8–5.6)
HCT VFR BLD AUTO: 41 % (ref 37.5–51)
HDLC SERPL-MCNC: 58 MG/DL (ref 40–60)
HGB BLD-MCNC: 13.3 G/DL (ref 13–17.7)
LDLC SERPL CALC-MCNC: 54 MG/DL (ref 0–100)
LDLC/HDLC SERPL: 0.93 {RATIO}
MCH RBC QN AUTO: 29.5 PG (ref 26.6–33)
MCHC RBC AUTO-ENTMCNC: 32.4 G/DL (ref 31.5–35.7)
MCV RBC AUTO: 90.9 FL (ref 79–97)
MICROALBUMIN UR-MCNC: 21.4 UG/ML
PLATELET # BLD AUTO: 233 10*3/MM3 (ref 140–450)
POTASSIUM SERPL-SCNC: 4.8 MMOL/L (ref 3.5–5.2)
PROT SERPL-MCNC: 6.4 G/DL (ref 6–8.5)
RBC # BLD AUTO: 4.51 10*6/MM3 (ref 4.14–5.8)
SODIUM SERPL-SCNC: 141 MMOL/L (ref 136–145)
TRIGL SERPL-MCNC: 70 MG/DL (ref 0–150)
TSH SERPL DL<=0.005 MIU/L-ACNC: 2.12 UIU/ML (ref 0.27–4.2)
VLDLC SERPL CALC-MCNC: 14 MG/DL (ref 5–40)
WBC # BLD AUTO: 9.84 10*3/MM3 (ref 3.4–10.8)

## 2023-10-02 RX ORDER — METOPROLOL TARTRATE 100 MG/1
TABLET ORAL
Qty: 180 TABLET | Refills: 3 | Status: SHIPPED | OUTPATIENT
Start: 2023-10-02

## 2023-11-28 ENCOUNTER — OFFICE VISIT (OUTPATIENT)
Dept: INTERNAL MEDICINE | Facility: CLINIC | Age: 88
End: 2023-11-28
Payer: MEDICARE

## 2023-11-28 VITALS
BODY MASS INDEX: 34.96 KG/M2 | HEIGHT: 69 IN | RESPIRATION RATE: 18 BRPM | HEART RATE: 89 BPM | DIASTOLIC BLOOD PRESSURE: 78 MMHG | WEIGHT: 236 LBS | SYSTOLIC BLOOD PRESSURE: 138 MMHG

## 2023-11-28 DIAGNOSIS — E11.9 TYPE 2 DIABETES MELLITUS WITHOUT COMPLICATION, WITHOUT LONG-TERM CURRENT USE OF INSULIN: Chronic | ICD-10-CM

## 2023-11-28 DIAGNOSIS — I10 ESSENTIAL HYPERTENSION: Primary | Chronic | ICD-10-CM

## 2023-11-28 DIAGNOSIS — I25.10 CORONARY ARTERY DISEASE INVOLVING NATIVE CORONARY ARTERY OF NATIVE HEART WITHOUT ANGINA PECTORIS: Chronic | ICD-10-CM

## 2023-11-28 DIAGNOSIS — E78.01 FAMILIAL HYPERCHOLESTEROLEMIA: Chronic | ICD-10-CM

## 2023-11-28 LAB
BUN SERPL-MCNC: 20 MG/DL (ref 8–23)
BUN/CREAT SERPL: 16.3 (ref 7–25)
CALCIUM SERPL-MCNC: 9.8 MG/DL (ref 8.6–10.5)
CHLORIDE SERPL-SCNC: 102 MMOL/L (ref 98–107)
CO2 SERPL-SCNC: 27.5 MMOL/L (ref 22–29)
CREAT SERPL-MCNC: 1.23 MG/DL (ref 0.76–1.27)
EGFRCR SERPLBLD CKD-EPI 2021: 57.2 ML/MIN/1.73
GLUCOSE SERPL-MCNC: 111 MG/DL (ref 65–99)
HBA1C MFR BLD: 6.5 % (ref 4.8–5.6)
POTASSIUM SERPL-SCNC: 4.7 MMOL/L (ref 3.5–5.2)
SODIUM SERPL-SCNC: 141 MMOL/L (ref 136–145)

## 2023-11-28 NOTE — PATIENT INSTRUCTIONS
"MyChart Tips:    MyChart is a useful part of our patient care program and is a great way for us to communicate lab results and for you to request refills.  Not all medical questions are appropriate for MyChart such as new medical issues that require taking a history, performing an exam, getting labs/studies or researching medical questions needed to be addressed in the office.    Examples of medical issues that are APPROPRIATE for MyChart:  -Follow-up on problems we have already addressed in a visit such as home testing results, blood pressure readings, glucose readings  -Questions that can be answered with a simple \"yes\" or \"no\"    Communication that is NOT APPROPRIATE for MyChart:  -MyChart is not for new problems, serious problems or urgent problems.  Urgent matters should be addressed by phone, in the office, urgent care or the ER.  -Ensyn messages are not email.  Staff will check messages each weekday.  We strive for a 48-hour turnaround on messaging.    -Presstlert is not for private issues.  Messages are received first by our office staff.    Please allow up to 7 business days for lab results to be sent through Ensyn to you before contacting your provider.  Sometimes we are waiting for results to get back from the lab and also your provider needs time to analyze them thoroughly before making recommendations.  While the internet has great resources, it is not a substitute for interpreting lab results.    We also ask that you not send Experifunt messages and telephone calls regarding the same issue simultaneously.  This slows down the process of returning your call/message and confuses staff.    Be mindful that Flinqerhart messages and telephone calls become part of your permanent medical record.    Lastly, your provider cannot access your Flinqerhart.  It is a service which communicates with the EHR (Electronic Health Record).  Sometimes there are errors in Ensyn that staff and providers cannot see and these errors " are not part of your EHR.  Vaccines and screening reminders have been incorrect in MyChart.    We appreciate your respect for the limitations and boundaries of Skipolahart.

## 2023-11-28 NOTE — PROGRESS NOTES
Chief Complaint  Follow-up and Diabetes    Subjective        Raymundo Lopez presents to [facility]  History of Present Illness    He is here today to follow-up on his hypertension, hyperlipidemia, diabetes and coronary artery disease.  Overall he has been doing well.  No new complaints regarding these conditions.  Blood pressure controlled in the office today.  Overall the diabetes has been controlled this year.  He is taking his medication as prescribed below.  Cholesterol was at goal for last check.  He is due to follow-up with cardiology next month.  No angina.      Current Outpatient Medications:     albuterol sulfate  (90 Base) MCG/ACT inhaler, Inhale 2 puffs Every 4 (Four) Hours As Needed for Wheezing. As needes as a rescue inhaler for asthma, Disp: , Rfl:     aspirin 81 MG tablet, Take 1 tablet by mouth 2 (Two) Times a Day., Disp: , Rfl:     atorvastatin (LIPITOR) 20 MG tablet, TAKE 1 TABLET BY MOUTH EVERYDAY AT BEDTIME, Disp: 90 tablet, Rfl: 3    clopidogrel (PLAVIX) 75 MG tablet, TAKE 1 TABLET BY MOUTH EVERY DAY, Disp: 90 tablet, Rfl: 3    losartan (COZAAR) 25 MG tablet, TAKE 1 TABLET BY MOUTH EVERY DAY, Disp: 90 tablet, Rfl: 1    metFORMIN (GLUCOPHAGE) 500 MG tablet, Take 1 tablet by mouth 2 (Two) Times a Day With Meals., Disp: 180 tablet, Rfl: 4    metoprolol tartrate (LOPRESSOR) 100 MG tablet, TAKE 1 TABLET BY MOUTH TWICE A DAY, Disp: 180 tablet, Rfl: 3    montelukast (SINGULAIR) 10 MG tablet, Take 1 tablet by mouth Daily., Disp: , Rfl:     nitroglycerin (Nitrostat) 0.4 MG SL tablet, Place 1 tablet under the tongue Every 5 (Five) Minutes As Needed for Chest Pain. Take no more than 3 doses in 15 minutes., Disp: 25 tablet, Rfl: 3    omeprazole (priLOSEC) 20 MG capsule, Take 1 capsule by mouth Daily., Disp: , Rfl:     SITagliptin (Januvia) 100 MG tablet, Take 0.5 tablets by mouth Daily., Disp: 45 tablet, Rfl: 1    Trelegy Ellipta 100-62.5-25 MCG/INH inhaler, , Disp: ,  "Rfl:       Objective   Vital Signs:  /78   Pulse 89   Resp 18   Ht 175.3 cm (69\")   Wt 107 kg (236 lb)   BMI 34.85 kg/m²   Estimated body mass index is 34.85 kg/m² as calculated from the following:    Height as of this encounter: 175.3 cm (69\").    Weight as of this encounter: 107 kg (236 lb).               Physical Exam  Vitals and nursing note reviewed.   Constitutional:       General: He is not in acute distress.     Appearance: Normal appearance.   Cardiovascular:      Rate and Rhythm: Normal rate and regular rhythm.      Heart sounds: Normal heart sounds. No murmur heard.  Pulmonary:      Effort: Pulmonary effort is normal.      Breath sounds: Normal breath sounds.   Neurological:      Mental Status: He is alert.        Result Review :  The following data was reviewed by: Oren Sahni MD on 11/28/2023:  Common labs          1/9/2023    13:44 7/24/2023    13:22   Common Labs   Glucose  110    BUN  17    Creatinine  1.23    Sodium  141    Potassium  4.8    Chloride  105    Calcium  9.5    Total Protein  6.4    Albumin  4.4    Total Bilirubin  0.4    Alkaline Phosphatase  93    AST (SGOT)  16    ALT (SGPT)  16    WBC  9.84    Hemoglobin  13.3    Hematocrit  41.0    Platelets  233    Total Cholesterol  126    Triglycerides  70    HDL Cholesterol  58    LDL Cholesterol   54    Hemoglobin A1C 6.30  6.80    Microalbumin, Urine  21.4                   Assessment and Plan   Diagnoses and all orders for this visit:    1. Essential hypertension (Primary)  -     Basic Metabolic Panel    2. Familial hypercholesterolemia  -     Basic Metabolic Panel    3. Type 2 diabetes mellitus without complication, without long-term current use of insulin  -     Basic Metabolic Panel  -     Hemoglobin A1c    4. Coronary artery disease involving native coronary artery of native heart without angina pectoris  -     Basic Metabolic Panel        Clinically stable chronic conditions as above.  Continue all medications as above.  " Labs reviewed/ordered as above.           Follow Up   Return in about 6 months (around 5/30/2024) for Medicare Wellness.  Patient was given instructions and counseling regarding his condition or for health maintenance advice. Please see specific information pulled into the AVS if appropriate.

## 2023-12-28 RX ORDER — LOSARTAN POTASSIUM 25 MG/1
TABLET ORAL
Qty: 90 TABLET | Refills: 3 | Status: SHIPPED | OUTPATIENT
Start: 2023-12-28

## 2024-01-25 ENCOUNTER — OFFICE VISIT (OUTPATIENT)
Age: 89
End: 2024-01-25
Payer: MEDICARE

## 2024-01-25 VITALS
WEIGHT: 233.6 LBS | HEIGHT: 69 IN | SYSTOLIC BLOOD PRESSURE: 110 MMHG | DIASTOLIC BLOOD PRESSURE: 62 MMHG | BODY MASS INDEX: 34.6 KG/M2 | HEART RATE: 64 BPM

## 2024-01-25 DIAGNOSIS — I25.118 CORONARY ARTERY DISEASE OF NATIVE ARTERY OF NATIVE HEART WITH STABLE ANGINA PECTORIS: Primary | ICD-10-CM

## 2024-01-25 DIAGNOSIS — E78.01 FAMILIAL HYPERCHOLESTEROLEMIA: Chronic | ICD-10-CM

## 2024-01-25 DIAGNOSIS — I10 ESSENTIAL HYPERTENSION: Chronic | ICD-10-CM

## 2024-01-25 PROCEDURE — 99214 OFFICE O/P EST MOD 30 MIN: CPT | Performed by: INTERNAL MEDICINE

## 2024-01-25 PROCEDURE — 1159F MED LIST DOCD IN RCRD: CPT | Performed by: INTERNAL MEDICINE

## 2024-01-25 PROCEDURE — 93000 ELECTROCARDIOGRAM COMPLETE: CPT | Performed by: INTERNAL MEDICINE

## 2024-01-25 PROCEDURE — 1160F RVW MEDS BY RX/DR IN RCRD: CPT | Performed by: INTERNAL MEDICINE

## 2024-01-25 RX ORDER — NITROGLYCERIN 0.4 MG/1
0.4 TABLET SUBLINGUAL
Qty: 25 TABLET | Refills: 3 | Status: SHIPPED | OUTPATIENT
Start: 2024-01-25

## 2024-01-25 NOTE — PROGRESS NOTES
[facility] Follow Up Office Note     Encounter Date:24  Patient:Raymundo Lopez  :1935  MRN:[id number]      Chief Complaint: Follow up on CAD    Chief Complaint   Patient presents with    Coronary Artery Disease     1 year f/u     History of Presenting Illness:      Mr. Lopez is a 88 y.o. gentleman with past medical history notable for coronary artery disease status post balloon angioplasty of a diagonal branch in the setting of an acute myocardial infarction, hypertension, mixed hyperlipidemia, and chronic lung disease who presents to our office for scheduled follow up.  Patient was last seen a year ago at that time was doing well since then over the last week he has had a few episodes of chest discomfort tends to come and go sometimes exertional sometimes not located in the upper chest.        Review of Systems:  Review of Systems   Constitutional: Negative.   HENT: Negative.     Eyes: Negative.    Cardiovascular:  Positive for chest pain and dyspnea on exertion.   Respiratory:  Positive for shortness of breath and wheezing.    Endocrine: Negative.    Hematologic/Lymphatic: Negative.    Skin: Negative.    Musculoskeletal: Negative.    Gastrointestinal: Negative.    Genitourinary: Negative.    Neurological: Negative.    Psychiatric/Behavioral: Negative.     Allergic/Immunologic: Negative.        Current Outpatient Medications on File Prior to Visit   Medication Sig Dispense Refill    albuterol sulfate  (90 Base) MCG/ACT inhaler Inhale 2 puffs Every 4 (Four) Hours As Needed for Wheezing. As needes as a rescue inhaler for asthma      aspirin 81 MG tablet Take 1 tablet by mouth 2 (Two) Times a Day.      atorvastatin (LIPITOR) 20 MG tablet TAKE 1 TABLET BY MOUTH EVERYDAY AT BEDTIME 90 tablet 3    clopidogrel (PLAVIX) 75 MG tablet TAKE 1 TABLET BY MOUTH EVERY DAY 90 tablet 3    losartan (COZAAR) 25 MG tablet TAKE 1 TABLET BY MOUTH EVERY DAY 90 tablet 3    metFORMIN (GLUCOPHAGE) 500 MG  tablet Take 1 tablet by mouth 2 (Two) Times a Day With Meals. (Patient taking differently: Take 2 tablets by mouth 2 (Two) Times a Day With Meals.) 180 tablet 4    metoprolol tartrate (LOPRESSOR) 100 MG tablet TAKE 1 TABLET BY MOUTH TWICE A DAY (Patient taking differently: Take 0.5 tablets by mouth 2 (Two) Times a Day.) 180 tablet 3    montelukast (SINGULAIR) 10 MG tablet Take 1 tablet by mouth Daily.      omeprazole (priLOSEC) 20 MG capsule Take 1 capsule by mouth Daily.      SITagliptin (Januvia) 100 MG tablet Take 0.5 tablets by mouth Daily. 45 tablet 1    Trelegy Ellipta 100-62.5-25 MCG/INH inhaler       [DISCONTINUED] nitroglycerin (Nitrostat) 0.4 MG SL tablet Place 1 tablet under the tongue Every 5 (Five) Minutes As Needed for Chest Pain. Take no more than 3 doses in 15 minutes. 25 tablet 3     No current facility-administered medications on file prior to visit.       No Known Allergies    Past Medical History:   Diagnosis Date    Angina pectoris     Arthritis     Asthma     Garcia esophagus     CAD (coronary artery disease)     Cancer     prostate    Cataract     Emphysema of lung     H/O electrocardiogram     Health care maintenance     History of EKG 06/17/2015    Hyperlipidemia     Hypertension     Myocardial infarction     Shortness of breath        Past Surgical History:   Procedure Laterality Date    ANAL FISTULA REPAIR      COLONOSCOPY N/A 3/29/2017    Procedure: COLONOSCOPY TO CECUM & T.I. WITH COLD POLYPECTOMIES,  HOT SNARE POLYPECTOMIES;  Surgeon: Oren HARPER MD;  Location: [facility] ENDOSCOPY;  Service:     ENDOSCOPY N/A 11/29/2016    Procedure: ESOPHAGOGASTRODUODENOSCOPY WITH BIOPSIES;  Surgeon: Oren HARPER MD;  Location: [facility] ENDOSCOPY;  Service:     PROSTATECTOMY         Social History     Socioeconomic History    Marital status:    Tobacco Use    Smoking status: Former    Smokeless tobacco: Never   Vaping Use    Vaping Use: Never used   Substance and Sexual Activity     "Alcohol use: No    Drug use: No    Sexual activity: Defer       History reviewed. No pertinent family history.    The following portions of the patient's history were reviewed and updated as appropriate: allergies, current medications, past family history, past medical history, past social history, past surgical history and problem list.       Objective:       Vitals:    01/25/24 0936   BP: 110/62   BP Location: Left arm   Patient Position: Sitting   Pulse: 64   Weight: 106 kg (233 lb 9.6 oz)   Height: 175.3 cm (69\")     Body mass index is 34.5 kg/m².     Physical Exam:  Constitutional: Well appearing, Well-developed, No acute distress   HENT: Oropharynx clear and membrane moist  Eyes: Normal conjunctiva, no sclera icterus.  Neck: Supple, no carotid bruit bilaterally.  Cardiovascular: Regular rate and rhythm, No Murmur, No bilateral lower extremity edema.  Pulmonary: Normal respiratory effort, normal lung sounds, mild wheezing.  Neurological: Alert and orient x 3.   Skin: Warm, dry, no ecchymosis, no rash.  Psych: Appropriate mood and affect. Normal judgment and insight.        Lab Results   Component Value Date    GLUCOSE 111 (H) 11/28/2023    BUN 20 11/28/2023    CREATININE 1.23 11/28/2023    EGFRIFNONA 76 10/05/2021    EGFRIFAFRI 93 10/05/2021    BCR 16.3 11/28/2023    K 4.7 11/28/2023    CO2 27.5 11/28/2023    CALCIUM 9.8 11/28/2023    PROTENTOTREF 6.4 07/24/2023    ALBUMIN 4.4 07/24/2023    LABIL2 2.2 07/24/2023    AST 16 07/24/2023    ALT 16 07/24/2023       Lab Results   Component Value Date    WBC 9.84 07/24/2023    HGB 13.3 07/24/2023    HCT 41.0 07/24/2023    MCV 90.9 07/24/2023     07/24/2023       No results found for: \"CKTOTAL\", \"CKMB\", \"CKMBINDEX\", \"TROPONINI\", \"TROPONINT\"    No results found for: \"PROBNP\"    Lab Results   Component Value Date    CHOL 125 10/09/2019    CHLPL 126 07/24/2023    CHLPL 117 04/15/2022    CHLPL 128 12/10/2020     Lab Results   Component Value Date    TRIG 70 " 07/24/2023    TRIG 70 04/15/2022    TRIG 185 (H) 12/10/2020     Lab Results   Component Value Date    HDL 58 07/24/2023    HDL 50 04/15/2022    HDL 55 12/10/2020     Lab Results   Component Value Date    LDL 54 07/24/2023    LDL 52 04/15/2022    LDL 43 12/10/2020       Lab Results   Component Value Date    TSH 2.120 07/24/2023           ECG 12 Lead    Date/Time: 1/25/2024 10:01 AM  Performed by: Jax Loomis MD    Authorized by: Jax Loomis MD  Comparison: compared with previous ECG from 12/28/2023  Similar to previous ECG  Rhythm: sinus rhythm      Stress MPI 4/30/2021:  There is a very small, medium intensity reversible defect in the distal inferior wall that may represent ischemia. The study was technically difficult due to profound intense subdiaphragmatic GI uptake of the radiotracer.  Left ventricular ejection fraction is hyperdynamic (Calculated EF > 70%).        Assessment:          Diagnosis Plan   1. Coronary artery disease of native artery of native heart with stable angina pectoris  Stress Test With Myocardial Perfusion One Day    ECG 12 Lead      2. Essential hypertension        3. Familial hypercholesterolemia                 Plan:       Mr. Loepz is a 88 y.o. gentleman with past medical history notable for coronary artery disease status post balloon angioplasty of a diagonal branch in the setting of an acute myocardial infarction, hypertension, mixed hyperlipidemia, and chronic lung disease who presents to our office for scheduled follow-up.  Overall patient is doing okay and low concerned about his recurrent chest pain issues is been a while since we have reevaluated for any significant ischemia will further stratify with stress test patient is unable to exercise due to underlying lung disease.  Will set up for Lexiscan stress MPI.  Otherwise blood pressure and heart rate well-controlled other risk factors well-controlled with good control of LDL on last check earlier last year.  If  stress test normal we will see back in a year    Coronary artery disease with angina:  Continue aspirin and plavix  Continue metoprolol 100 mg  Continue statin    Hypertension:  Well controlled    Mixed Hyperlipidemia:  Continue statin   Lipid panel 7/2023 demonstrates normal well controlled LDL and Total Cholesterol  CMP 7/2023 normal ALT and AST    Advance Care Planning   ACP discussion was held with the patient during this visit. Patient has an advance directive (not in EMR), copy requested.       Follow up:  12 months      Jax Loomis MD  [facility]  01/25/24  10:01 EST

## 2024-02-07 ENCOUNTER — TELEPHONE (OUTPATIENT)
Dept: CARDIOLOGY | Facility: CLINIC | Age: 89
End: 2024-02-07
Payer: MEDICARE

## 2024-02-13 ENCOUNTER — TELEPHONE (OUTPATIENT)
Dept: CARDIOLOGY | Facility: CLINIC | Age: 89
End: 2024-02-13
Payer: MEDICARE

## 2024-02-14 ENCOUNTER — HOSPITAL ENCOUNTER (OUTPATIENT)
Dept: CARDIOLOGY | Facility: HOSPITAL | Age: 89
Discharge: HOME | End: 2024-02-14
Admitting: INTERNAL MEDICINE
Payer: MEDICARE

## 2024-02-14 ENCOUNTER — TELEPHONE (OUTPATIENT)
Dept: CARDIOLOGY | Facility: CLINIC | Age: 89
End: 2024-02-14
Payer: MEDICARE

## 2024-02-14 VITALS — HEIGHT: 69 IN | WEIGHT: 233.69 LBS | BODY MASS INDEX: 34.61 KG/M2

## 2024-02-14 DIAGNOSIS — I25.118 CORONARY ARTERY DISEASE OF NATIVE ARTERY OF NATIVE HEART WITH STABLE ANGINA PECTORIS: ICD-10-CM

## 2024-02-14 DIAGNOSIS — I25.118 CORONARY ARTERY DISEASE OF NATIVE ARTERY OF NATIVE HEART WITH STABLE ANGINA PECTORIS: Primary | ICD-10-CM

## 2024-02-14 DIAGNOSIS — R94.39 ABNORMAL NUCLEAR STRESS TEST: ICD-10-CM

## 2024-02-14 LAB
BH CV NUCLEAR PRIOR STUDY: 2
BH CV REST NUCLEAR ISOTOPE DOSE: 41.8 MCI
BH CV STRESS BP STAGE 1: NORMAL
BH CV STRESS COMMENTS STAGE 1: NORMAL
BH CV STRESS DOSE REGADENOSON STAGE 1: 0.4
BH CV STRESS DURATION MIN STAGE 1: 0
BH CV STRESS DURATION SEC STAGE 1: 10
BH CV STRESS HR STAGE 1: 73
BH CV STRESS NUCLEAR ISOTOPE DOSE: 41.8 MCI
BH CV STRESS PROTOCOL 1: NORMAL
BH CV STRESS RECOVERY BP: NORMAL MMHG
BH CV STRESS RECOVERY HR: 64 BPM
BH CV STRESS STAGE 1: 1
LV EF NUC BP: 80 %
MAXIMAL PREDICTED HEART RATE: 134 BPM
PERCENT MAX PREDICTED HR: 54.48 %
STRESS BASELINE BP: NORMAL MMHG
STRESS BASELINE HR: 62 BPM
STRESS PERCENT HR: 64 %
STRESS POST EXERCISE DUR SEC: 10 SEC
STRESS POST PEAK BP: NORMAL MMHG
STRESS POST PEAK HR: 73 BPM
STRESS TARGET HR: 114 BPM

## 2024-02-14 PROCEDURE — 78492 MYOCRD IMG PET MLT RST&STRS: CPT

## 2024-02-14 PROCEDURE — 25010000002 REGADENOSON 0.4 MG/5ML SOLUTION: Performed by: INTERNAL MEDICINE

## 2024-02-14 PROCEDURE — 93017 CV STRESS TEST TRACING ONLY: CPT

## 2024-02-14 PROCEDURE — A9555 RB82 RUBIDIUM: HCPCS | Performed by: INTERNAL MEDICINE

## 2024-02-14 PROCEDURE — 0 RUBIDIUM CHLORIDE: Performed by: INTERNAL MEDICINE

## 2024-02-14 RX ORDER — REGADENOSON 0.08 MG/ML
0.4 INJECTION, SOLUTION INTRAVENOUS
Status: COMPLETED | OUTPATIENT
Start: 2024-02-14 | End: 2024-02-14

## 2024-02-14 RX ADMIN — REGADENOSON 0.4 MG: 0.08 INJECTION, SOLUTION INTRAVENOUS at 08:15

## 2024-02-16 ENCOUNTER — TRANSCRIBE ORDERS (OUTPATIENT)
Dept: CARDIOLOGY | Facility: CLINIC | Age: 89
End: 2024-02-16
Payer: MEDICARE

## 2024-02-16 DIAGNOSIS — Z13.6 SCREENING FOR ISCHEMIC HEART DISEASE: ICD-10-CM

## 2024-02-16 DIAGNOSIS — Z01.810 PRE-OPERATIVE CARDIOVASCULAR EXAMINATION: Primary | ICD-10-CM

## 2024-02-16 PROBLEM — R94.39 ABNORMAL NUCLEAR STRESS TEST: Status: ACTIVE | Noted: 2024-02-14

## 2024-03-07 ENCOUNTER — LAB (OUTPATIENT)
Dept: LAB | Facility: HOSPITAL | Age: 89
End: 2024-03-07
Payer: MEDICARE

## 2024-03-07 DIAGNOSIS — Z01.810 PRE-OPERATIVE CARDIOVASCULAR EXAMINATION: ICD-10-CM

## 2024-03-07 DIAGNOSIS — Z13.6 SCREENING FOR ISCHEMIC HEART DISEASE: ICD-10-CM

## 2024-03-07 LAB
ANION GAP SERPL CALCULATED.3IONS-SCNC: 12.6 MMOL/L (ref 5–15)
BASOPHILS # BLD AUTO: 0.07 10*3/MM3 (ref 0–0.2)
BASOPHILS NFR BLD AUTO: 0.7 % (ref 0–1.5)
BUN SERPL-MCNC: 21 MG/DL (ref 8–23)
BUN/CREAT SERPL: 17.8 (ref 7–25)
CALCIUM SPEC-SCNC: 9.4 MG/DL (ref 8.6–10.5)
CHLORIDE SERPL-SCNC: 103 MMOL/L (ref 98–107)
CO2 SERPL-SCNC: 25.4 MMOL/L (ref 22–29)
CREAT SERPL-MCNC: 1.18 MG/DL (ref 0.76–1.27)
DEPRECATED RDW RBC AUTO: 45.8 FL (ref 37–54)
EGFRCR SERPLBLD CKD-EPI 2021: 60.1 ML/MIN/1.73
EOSINOPHIL # BLD AUTO: 0.4 10*3/MM3 (ref 0–0.4)
EOSINOPHIL NFR BLD AUTO: 4.1 % (ref 0.3–6.2)
ERYTHROCYTE [DISTWIDTH] IN BLOOD BY AUTOMATED COUNT: 13.5 % (ref 12.3–15.4)
GLUCOSE SERPL-MCNC: 112 MG/DL (ref 65–99)
HCT VFR BLD AUTO: 41.8 % (ref 37.5–51)
HGB BLD-MCNC: 13.5 G/DL (ref 13–17.7)
IMM GRANULOCYTES # BLD AUTO: 0.02 10*3/MM3 (ref 0–0.05)
IMM GRANULOCYTES NFR BLD AUTO: 0.2 % (ref 0–0.5)
LYMPHOCYTES # BLD AUTO: 1.9 10*3/MM3 (ref 0.7–3.1)
LYMPHOCYTES NFR BLD AUTO: 19.3 % (ref 19.6–45.3)
MCH RBC QN AUTO: 29.5 PG (ref 26.6–33)
MCHC RBC AUTO-ENTMCNC: 32.3 G/DL (ref 31.5–35.7)
MCV RBC AUTO: 91.5 FL (ref 79–97)
MONOCYTES # BLD AUTO: 0.91 10*3/MM3 (ref 0.1–0.9)
MONOCYTES NFR BLD AUTO: 9.2 % (ref 5–12)
NEUTROPHILS NFR BLD AUTO: 6.56 10*3/MM3 (ref 1.7–7)
NEUTROPHILS NFR BLD AUTO: 66.5 % (ref 42.7–76)
NRBC BLD AUTO-RTO: 0 /100 WBC (ref 0–0.2)
PLATELET # BLD AUTO: 237 10*3/MM3 (ref 140–450)
PMV BLD AUTO: 10.2 FL (ref 6–12)
POTASSIUM SERPL-SCNC: 4.7 MMOL/L (ref 3.5–5.2)
RBC # BLD AUTO: 4.57 10*6/MM3 (ref 4.14–5.8)
SODIUM SERPL-SCNC: 141 MMOL/L (ref 136–145)
WBC NRBC COR # BLD AUTO: 9.86 10*3/MM3 (ref 3.4–10.8)

## 2024-03-07 PROCEDURE — 36415 COLL VENOUS BLD VENIPUNCTURE: CPT

## 2024-03-07 PROCEDURE — 80048 BASIC METABOLIC PNL TOTAL CA: CPT

## 2024-03-07 PROCEDURE — 85025 COMPLETE CBC W/AUTO DIFF WBC: CPT

## 2024-03-08 ENCOUNTER — HOSPITAL ENCOUNTER (OUTPATIENT)
Facility: HOSPITAL | Age: 89
Setting detail: HOSPITAL OUTPATIENT SURGERY
Discharge: HOME | End: 2024-03-08
Attending: INTERNAL MEDICINE | Admitting: INTERNAL MEDICINE
Payer: MEDICARE

## 2024-03-08 VITALS
TEMPERATURE: 97.6 F | HEART RATE: 46 BPM | RESPIRATION RATE: 16 BRPM | SYSTOLIC BLOOD PRESSURE: 137 MMHG | BODY MASS INDEX: 34.8 KG/M2 | HEIGHT: 69 IN | OXYGEN SATURATION: 95 % | DIASTOLIC BLOOD PRESSURE: 62 MMHG | WEIGHT: 235 LBS

## 2024-03-08 DIAGNOSIS — E11.9 TYPE 2 DIABETES MELLITUS WITHOUT COMPLICATION, WITHOUT LONG-TERM CURRENT USE OF INSULIN: ICD-10-CM

## 2024-03-08 DIAGNOSIS — I25.118 CORONARY ARTERY DISEASE OF NATIVE ARTERY OF NATIVE HEART WITH STABLE ANGINA PECTORIS: ICD-10-CM

## 2024-03-08 DIAGNOSIS — R94.39 ABNORMAL NUCLEAR STRESS TEST: ICD-10-CM

## 2024-03-08 LAB — GLUCOSE BLDC GLUCOMTR-MCNC: 133 MG/DL (ref 70–130)

## 2024-03-08 PROCEDURE — S0260 H&P FOR SURGERY: HCPCS | Performed by: INTERNAL MEDICINE

## 2024-03-08 PROCEDURE — C1894 INTRO/SHEATH, NON-LASER: HCPCS | Performed by: INTERNAL MEDICINE

## 2024-03-08 PROCEDURE — 25810000003 SODIUM CHLORIDE 0.9 % SOLUTION: Performed by: INTERNAL MEDICINE

## 2024-03-08 PROCEDURE — 93458 L HRT ARTERY/VENTRICLE ANGIO: CPT | Performed by: INTERNAL MEDICINE

## 2024-03-08 PROCEDURE — 25010000002 HEPARIN (PORCINE) PER 1000 UNITS: Performed by: INTERNAL MEDICINE

## 2024-03-08 PROCEDURE — 82948 REAGENT STRIP/BLOOD GLUCOSE: CPT

## 2024-03-08 PROCEDURE — 25010000002 MIDAZOLAM PER 1 MG: Performed by: INTERNAL MEDICINE

## 2024-03-08 PROCEDURE — C1769 GUIDE WIRE: HCPCS | Performed by: INTERNAL MEDICINE

## 2024-03-08 PROCEDURE — 25510000001 IOPAMIDOL PER 1 ML: Performed by: INTERNAL MEDICINE

## 2024-03-08 PROCEDURE — 99152 MOD SED SAME PHYS/QHP 5/>YRS: CPT | Performed by: INTERNAL MEDICINE

## 2024-03-08 PROCEDURE — 25010000002 FENTANYL CITRATE (PF) 50 MCG/ML SOLUTION: Performed by: INTERNAL MEDICINE

## 2024-03-08 RX ORDER — SODIUM CHLORIDE 9 MG/ML
75 INJECTION, SOLUTION INTRAVENOUS CONTINUOUS
Status: DISCONTINUED | OUTPATIENT
Start: 2024-03-08 | End: 2024-03-08 | Stop reason: HOSPADM

## 2024-03-08 RX ORDER — HEPARIN SODIUM 1000 [USP'U]/ML
INJECTION, SOLUTION INTRAVENOUS; SUBCUTANEOUS
Status: DISCONTINUED | OUTPATIENT
Start: 2024-03-08 | End: 2024-03-08 | Stop reason: HOSPADM

## 2024-03-08 RX ORDER — LIDOCAINE HYDROCHLORIDE 20 MG/ML
INJECTION, SOLUTION INFILTRATION; PERINEURAL
Status: DISCONTINUED | OUTPATIENT
Start: 2024-03-08 | End: 2024-03-08 | Stop reason: HOSPADM

## 2024-03-08 RX ORDER — SODIUM CHLORIDE 9 MG/ML
40 INJECTION, SOLUTION INTRAVENOUS AS NEEDED
Status: DISCONTINUED | OUTPATIENT
Start: 2024-03-08 | End: 2024-03-08 | Stop reason: HOSPADM

## 2024-03-08 RX ORDER — MIDAZOLAM HYDROCHLORIDE 1 MG/ML
INJECTION INTRAMUSCULAR; INTRAVENOUS
Status: DISCONTINUED | OUTPATIENT
Start: 2024-03-08 | End: 2024-03-08 | Stop reason: HOSPADM

## 2024-03-08 RX ORDER — SODIUM CHLORIDE 0.9 % (FLUSH) 0.9 %
10 SYRINGE (ML) INJECTION AS NEEDED
Status: DISCONTINUED | OUTPATIENT
Start: 2024-03-08 | End: 2024-03-08 | Stop reason: HOSPADM

## 2024-03-08 RX ORDER — SODIUM CHLORIDE 0.9 % (FLUSH) 0.9 %
3 SYRINGE (ML) INJECTION EVERY 12 HOURS SCHEDULED
Status: DISCONTINUED | OUTPATIENT
Start: 2024-03-08 | End: 2024-03-08 | Stop reason: HOSPADM

## 2024-03-08 RX ORDER — SODIUM CHLORIDE 9 MG/ML
100 INJECTION, SOLUTION INTRAVENOUS CONTINUOUS
Status: DISCONTINUED | OUTPATIENT
Start: 2024-03-08 | End: 2024-03-08 | Stop reason: HOSPADM

## 2024-03-08 RX ORDER — SODIUM CHLORIDE 0.9 % (FLUSH) 0.9 %
10 SYRINGE (ML) INJECTION EVERY 12 HOURS SCHEDULED
Status: DISCONTINUED | OUTPATIENT
Start: 2024-03-08 | End: 2024-03-08 | Stop reason: HOSPADM

## 2024-03-08 RX ORDER — ACETAMINOPHEN 325 MG/1
650 TABLET ORAL EVERY 4 HOURS PRN
Status: DISCONTINUED | OUTPATIENT
Start: 2024-03-08 | End: 2024-03-08 | Stop reason: HOSPADM

## 2024-03-08 RX ORDER — VERAPAMIL HYDROCHLORIDE 2.5 MG/ML
INJECTION, SOLUTION INTRAVENOUS
Status: DISCONTINUED | OUTPATIENT
Start: 2024-03-08 | End: 2024-03-08 | Stop reason: HOSPADM

## 2024-03-08 RX ORDER — FENTANYL CITRATE 50 UG/ML
INJECTION, SOLUTION INTRAMUSCULAR; INTRAVENOUS
Status: DISCONTINUED | OUTPATIENT
Start: 2024-03-08 | End: 2024-03-08 | Stop reason: HOSPADM

## 2024-03-08 RX ADMIN — SODIUM CHLORIDE 75 ML/HR: 9 INJECTION, SOLUTION INTRAVENOUS at 07:31

## 2024-03-08 NOTE — Clinical Note
Hemostasis started on the right radial artery. R-Band was used in achieving hemostasis. Radial compression device applied to vessel. Hemostasis achieved successfully. Closure device additional comment: Tr band with 14 cc of air

## 2024-03-08 NOTE — H&P
[facility] History and Physical       Encounter Date:24  Patient:Raymundo Lopez  :1935  MRN:[id number]    Date of Admission: 3/8/2024  Date of Encounter Visit: 24  Encounter Provider: Jax Loomis MD  Place of Service: [facility]  Patient Care Team:  Oren Sahni MD as PCP - General (Family Medicine)      Chief Complaint: Chest pain      History of Presenting Illness:        Mr. Lopez is an 88-year-old gentleman who follows with me he was last seen in the office in January at that time he was having some chest discomfort consistent with angina and underwent further evaluation with stress testing which identified anterior wall ischemia and thus referred for cardiac catheterization    Review of Systems:  Review of Systems   Cardiovascular:  Positive for chest pain.       Medications:  Prior to Admission medications    Medication Sig Start Date End Date Taking? Authorizing Provider   Edel Ellipta 100-62.5-25 MCG/INH inhaler  6/3/21  Yes Pina Rivas MD   aspirin 81 MG tablet Take 1 tablet by mouth 2 (Two) Times a Day. 12   Pina Rivas MD   atorvastatin (LIPITOR) 20 MG tablet TAKE 1 TABLET BY MOUTH EVERYDAY AT BEDTIME 4/10/23   Jax Loomis MD   clopidogrel (PLAVIX) 75 MG tablet TAKE 1 TABLET BY MOUTH EVERY DAY 23   Jax Loomis MD   losartan (COZAAR) 25 MG tablet TAKE 1 TABLET BY MOUTH EVERY DAY 23   Jax Loomis MD   metFORMIN (GLUCOPHAGE) 500 MG tablet Take 1 tablet by mouth 2 (Two) Times a Day With Meals.  Patient taking differently: Take 2 tablets by mouth 2 (Two) Times a Day With Meals. 1/10/23   Oren Sahni MD   metoprolol tartrate (LOPRESSOR) 100 MG tablet TAKE 1 TABLET BY MOUTH TWICE A DAY  Patient taking differently: Take 0.5 tablets by mouth 2 (Two) Times a Day. 10/2/23   Jax Loomis MD   montelukast (SINGULAIR) 10 MG tablet Take 1 tablet by mouth Daily. 12   Evelyn  MD Pina   nitroglycerin (Nitrostat) 0.4 MG SL tablet Place 1 tablet under the tongue Every 5 (Five) Minutes As Needed for Chest Pain. Take no more than 3 doses in 15 minutes. 1/25/24   Jax Loomis MD   omeprazole (priLOSEC) 20 MG capsule Take 1 capsule by mouth Daily.    ProviderPina MD   SITagliptin (Januvia) 100 MG tablet Take 0.5 tablets by mouth Daily. 7/24/23   Oren Sahni MD   albuterol sulfate  (90 Base) MCG/ACT inhaler Inhale 2 puffs Every 4 (Four) Hours As Needed for Wheezing. As needes as a rescue inhaler for asthma  3/8/24  ProviderPina MD       No Known Allergies    Past Medical History:   Diagnosis Date    Angina pectoris     Arthritis     Asthma     Garcia esophagus     CAD (coronary artery disease)     Cancer     prostate    Cataract     Emphysema of lung     H/O electrocardiogram     Health care maintenance     History of EKG 06/17/2015    Hyperlipidemia     Hypertension     Myocardial infarction     Shortness of breath        Past Surgical History:   Procedure Laterality Date    ANAL FISTULA REPAIR      COLONOSCOPY N/A 3/29/2017    Procedure: COLONOSCOPY TO CECUM & T.I. WITH COLD POLYPECTOMIES,  HOT SNARE POLYPECTOMIES;  Surgeon: Oren HARPER MD;  Location: [facility] ENDOSCOPY;  Service:     ENDOSCOPY N/A 11/29/2016    Procedure: ESOPHAGOGASTRODUODENOSCOPY WITH BIOPSIES;  Surgeon: Oren HARPER MD;  Location: [facility] ENDOSCOPY;  Service:     PROSTATECTOMY         Social History     Socioeconomic History    Marital status:    Tobacco Use    Smoking status: Former    Smokeless tobacco: Never   Vaping Use    Vaping status: Never Used   Substance and Sexual Activity    Alcohol use: No    Drug use: No    Sexual activity: Defer       No family history on file.      The following portions of the patient's history were reviewed and updated as appropriate: allergies, current medications, past family history, past medical history, past social  "history, past surgical history and problem list.         Objective:          No intake or output data in the 24 hours ending 03/08/24 0728  There is no height or weight on file to calculate BMI.  There were no vitals filed for this visit.        Physical Exam:  Constitutional: Well appearing, well developed, no acute distress   HENT: Oropharynx clear and membrane moist  Eyes: Normal conjunctiva, no sclera icterus.  Neck: Supple, no carotid bruit bilaterally.  Cardiovascular: Regular rate and rhythm, No Murmur, No bilateral lower extremity edema.  Pulmonary: Normal respiratory effort, normal lung sounds, no wheezing.  Abdominal: Soft, nontender, no hepatosplenomegaly, liver is non-pulsatile.  Neurological: Alert and orient x 3.   Skin: Warm, dry, no ecchymosis, no rash.  Psych: Appropriate mood and affect. Normal judgment and insight.        Lab Review:   Results from last 7 days   Lab Units 03/07/24  1430   SODIUM mmol/L 141   POTASSIUM mmol/L 4.7   CHLORIDE mmol/L 103   CO2 mmol/L 25.4   BUN mg/dL 21   CREATININE mg/dL 1.18   GLUCOSE mg/dL 112*   CALCIUM mg/dL 9.4         Results from last 7 days   Lab Units 03/07/24  1430   WBC 10*3/mm3 9.86   HEMOGLOBIN g/dL 13.5   HEMATOCRIT % 41.8   PLATELETS 10*3/mm3 237                   Invalid input(s): \"LDLCALC\"  The ASCVD Risk score (Ever REESE, et al., 2019) failed to calculate for the following reasons:    The 2019 ASCVD risk score is only valid for ages 40 to 79    The patient has a prior MI or stroke diagnosis                   Assessment:           Abnormal nuclear stress test    Coronary artery disease of native artery of native heart with stable angina pectoris         Plan:       Mr. Lopez is an 88-year-old gentleman with past medical history notable for coronary artery disease with prior percutaneous intervention, hypertension, mixed hyperlipidemia presents to the hospital for elective cardiac catheterization due to abnormal stress test    Chest pain/abnormal " stress test:  Seated forward with cardiac catheterization           Jax Loomis MD  [facility]  03/08/24  07:28 EST

## 2024-03-08 NOTE — DISCHARGE INSTRUCTIONS
[facility]  [address] [place] [address]    Coronary Angiogram (Radial/Ulnar Approach) After Care    Refer to this sheet in the next few weeks. These instructions provide you with information on caring for yourself after your procedure. Your caregiver may also give you more specific instructions. Your treatment has been planned according to current medical practices, but problems sometimes occur. Call your caregiver if you have any problems or questions after your procedure.    Home Care Instructions:  You may shower the day after the procedure. Remove the bandage (dressing) and gently wash the site with plain soap and water. Gently pat the site dry. You may apply a band aid daily for 2 days if desired.    Do not apply powder or lotion to the site.  Do not submerge the affected site in water for 3 to 5 days or until the site is completely healed.   Do not lift, push or pull anything over 5 pounds for 5 days after your procedure or as directed by your physician.  As a reference, a gallon of milk weighs 8 pounds.   Inspect the site at least twice daily. You may notice some bruising at the site and it may be tender for 1 to 2 weeks.     Increase your fluid intake for the next 2 days.    Keep arm elevated for 24 hours. For the remainder of the day, keep your arm in “Pledge of Allegiance” position when up and about.     You may drive 24 hours after the procedure unless otherwise instructed by your caregiver.  Do not operate machinery or power tools for 24 hours.  A responsible adult should be with you for the first 24 hours after you arrive home. Do not make any important legal decisions or sign legal papers for 24 hours.  Do not drink alcohol for 24 hours.    Metformin or any medications containing Metformin should not be taken for 48 hours after your procedure.      Call Your Doctor if:   You have unusual pain at the radial/ulnar (wrist) site.  You have redness, warmth, swelling, or pain at the  radial/ulnar (wrist) site.  You have drainage (other than a small amount of blood on the dressing).  `You have chills or a fever > 101.  Your arm becomes pale or dark, cool, tingly, or numb.  You develop chest pain, shortness of breath, feel faint or pass out.    You have heavy bleeding from the site, hold pressure on the site for 20 minutes.  If the bleeding stops, apply a fresh bandage and call your cardiologist.  However, if you        continue to have bleeding, call 911 and continue to apply pressure to the site.   You have any symptoms of a stroke.  Remember BE FAST  B-balance. Sudden trouble walking or loss of balance.  E-eyes.  Sudden changes in how you see or a sudden onset of a very bad headache.   F-face. Sudden weakness or loss of feeling of the face or facial droop on one side.   A-arms Sudden weakness or numbness in one arm.  One arm drifts down if they are both held out in front of you. This happens suddenly and usually on one side of the body.   S-speech.  Sudden trouble speaking, slurred speech or trouble understanding what are saying.   T-time  Time to call emergency services.  Write down the symptoms and the time they started.

## 2024-03-22 ENCOUNTER — OFFICE VISIT (OUTPATIENT)
Dept: CARDIOLOGY | Facility: CLINIC | Age: 89
End: 2024-03-22
Payer: MEDICARE

## 2024-03-22 VITALS
HEIGHT: 69 IN | SYSTOLIC BLOOD PRESSURE: 138 MMHG | BODY MASS INDEX: 34.07 KG/M2 | HEART RATE: 57 BPM | DIASTOLIC BLOOD PRESSURE: 70 MMHG | WEIGHT: 230 LBS

## 2024-03-22 DIAGNOSIS — I25.118 CORONARY ARTERY DISEASE OF NATIVE ARTERY OF NATIVE HEART WITH STABLE ANGINA PECTORIS: Primary | ICD-10-CM

## 2024-03-22 RX ORDER — MONTELUKAST SODIUM 10 MG/1
10 TABLET ORAL NIGHTLY
COMMUNITY

## 2024-03-22 NOTE — PROGRESS NOTES
[facility] Follow Up Office Note     Encounter Date:24  Patient:Raymundo Lopez  :1935  MRN:[id number]      Chief Complaint:   Chief Complaint   Patient presents with    Hospital Follow Up Visit         History of Presenting Illness:        Raymundo Lopez is a 88 y.o. male who is here for follow-up.  He is a patient of Dr Loomis.    Patient has past medical history significant for coronary artery disease status post balloon angioplasty of the diagonal branch in setting of acute MI, hypertension, mixed hyperlipidemia, chronic lung disease.    Patient had seen Dr. Loomis in January at which time he reported chest discomfort consistent with angina.  He had stress testing which identified anterior wall ischemia and he was referred for cardiac catheterization.  His heart cath showed moderate disease with recommendation for continued medical management.  He had a normal LVEDP.    Patient is here today for follow-up.  He states prior chest pains have occurred at random and were not associated with activity.  He is not having any chest pain since his cath.  He has no concerns at cath stick site.  He thinks some of what prompted this evaluation was he feels a little more shortness of breath which she thinks could also be related to his weight.      Review of Systems:  Review of Systems   Cardiovascular:  Negative for chest pain, dyspnea on exertion, leg swelling, orthopnea and palpitations.   Respiratory:  Negative for shortness of breath.        Current Outpatient Medications on File Prior to Visit   Medication Sig Dispense Refill    aspirin 81 MG tablet Take 1 tablet by mouth 2 (Two) Times a Day.      atorvastatin (LIPITOR) 20 MG tablet TAKE 1 TABLET BY MOUTH EVERYDAY AT BEDTIME 90 tablet 3    clopidogrel (PLAVIX) 75 MG tablet TAKE 1 TABLET BY MOUTH EVERY DAY 90 tablet 3    losartan (COZAAR) 25 MG tablet TAKE 1 TABLET BY MOUTH EVERY DAY 90 tablet 3    metFORMIN (GLUCOPHAGE) 500 MG tablet Take  LMOM for patient to call back and reschedule appointment time from 1pm to 3pm instead.    1 tablet by mouth 2 (Two) Times a Day With Meals. (Patient taking differently: Take 2 tablets by mouth 2 (Two) Times a Day With Meals.)      metoprolol tartrate (LOPRESSOR) 100 MG tablet TAKE 1 TABLET BY MOUTH TWICE A DAY (Patient taking differently: Take 0.5 tablets by mouth 2 (Two) Times a Day.) 180 tablet 3    montelukast (SINGULAIR) 10 MG tablet Take 1 tablet by mouth Every Night.      nitroglycerin (Nitrostat) 0.4 MG SL tablet Place 1 tablet under the tongue Every 5 (Five) Minutes As Needed for Chest Pain. Take no more than 3 doses in 15 minutes. 25 tablet 3    omeprazole (priLOSEC) 20 MG capsule Take 1 capsule by mouth Daily.      SITagliptin (Januvia) 100 MG tablet Take 0.5 tablets by mouth Daily. 45 tablet 1    Trelegy Ellipta 100-62.5-25 MCG/INH inhaler        No current facility-administered medications on file prior to visit.       No Known Allergies    Past Medical History:   Diagnosis Date    Angina pectoris     Arthritis     Asthma     Garcia esophagus     CAD (coronary artery disease)     Cancer     prostate    Cataract     Emphysema of lung     H/O electrocardiogram     Health care maintenance     History of EKG 06/17/2015    Hyperlipidemia     Hypertension     Myocardial infarction     Shortness of breath        Past Surgical History:   Procedure Laterality Date    ANAL FISTULA REPAIR      CARDIAC CATHETERIZATION N/A 3/8/2024    Procedure: Left Heart Cath;  Surgeon: Jax Loomis MD;  Location: [facility] CATH INVASIVE LOCATION;  Service: Cardiovascular;  Laterality: N/A;    COLONOSCOPY N/A 3/29/2017    Procedure: COLONOSCOPY TO CECUM & T.I. WITH COLD POLYPECTOMIES,  HOT SNARE POLYPECTOMIES;  Surgeon: Orne HARPER MD;  Location: [facility] ENDOSCOPY;  Service:     ENDOSCOPY N/A 11/29/2016    Procedure: ESOPHAGOGASTRODUODENOSCOPY WITH BIOPSIES;  Surgeon: Oren HARPER MD;  Location: [facility] ENDOSCOPY;  Service:     PROSTATECTOMY         Social History     Socioeconomic History    Marital  "status:    Tobacco Use    Smoking status: Former    Smokeless tobacco: Never    Tobacco comments:     Caffeine use    Vaping Use    Vaping status: Never Used   Substance and Sexual Activity    Alcohol use: No    Drug use: No    Sexual activity: Defer       History reviewed. No pertinent family history.    The following portions of the patient's history were reviewed and updated as appropriate: allergies, current medications, past family history, past medical history, past social history, past surgical history and problem list.       Objective:       Vitals:    03/22/24 1129   BP: 138/70   BP Location: Left arm   Patient Position: Sitting   Pulse: 57   Weight: 104 kg (230 lb)   Height: 175.3 cm (69\")         Physical Exam:  Constitutional: Well appearing, well developed, no acute distress   HENT: Oropharynx clear and membrane moist  Eyes: Normal conjunctiva, no sclera icterus  Neck: Supple, no carotid bruit bilaterally  Cardiovascular: Regular rate and rhythm, No Murmur, No bilateral lower extremity edema  Pulmonary: Normal respiratory effort, normal lung sounds, no wheezing  Neurological: Alert and orient x 3  Skin: Warm, dry, no ecchymosis, no rash  Psych: Appropriate mood and affect. Normal judgment and insight     Radial cath stick site healed without hematoma    Lab Results   Component Value Date     03/07/2024     11/28/2023    K 4.7 03/07/2024    K 4.7 11/28/2023     03/07/2024     11/28/2023    CO2 25.4 03/07/2024    CO2 27.5 11/28/2023    BUN 21 03/07/2024    BUN 20 11/28/2023    CREATININE 1.18 03/07/2024    CREATININE 1.23 11/28/2023    EGFRIFNONA 76 10/05/2021    EGFRIFNONA 60 (L) 12/10/2020    EGFRIFAFRI 93 10/05/2021    EGFRIFAFRI 73 12/10/2020    GLUCOSE 112 (H) 03/07/2024    GLUCOSE 111 (H) 11/28/2023    CALCIUM 9.4 03/07/2024    CALCIUM 9.8 11/28/2023    PROTENTOTREF 6.4 07/24/2023    PROTENTOTREF 6.3 04/15/2022    ALBUMIN 4.4 07/24/2023    ALBUMIN 3.8 04/15/2022    " "BILITOT 0.4 07/24/2023    BILITOT 0.4 04/15/2022    AST 16 07/24/2023    AST 17 04/15/2022    ALT 16 07/24/2023    ALT 14 04/15/2022     Lab Results   Component Value Date    WBC 9.86 03/07/2024    WBC 9.84 07/24/2023    HGB 13.5 03/07/2024    HGB 13.3 07/24/2023    HCT 41.8 03/07/2024    HCT 41.0 07/24/2023    MCV 91.5 03/07/2024    MCV 90.9 07/24/2023     03/07/2024     07/24/2023     Lab Results   Component Value Date    CHOL 125 10/09/2019    TRIG 70 07/24/2023    TRIG 70 04/15/2022    HDL 58 07/24/2023    HDL 50 04/15/2022    LDL 54 07/24/2023    LDL 52 04/15/2022     No results found for: \"PROBNP\", \"BNP\"  No results found for: \"CKTOTAL\", \"CKMB\", \"CKMBINDEX\", \"TROPONINI\", \"TROPONINT\"  Lab Results   Component Value Date    TSH 2.120 07/24/2023    TSH 1.500 06/09/2020           ECG 12 Lead    Date/Time: 3/22/2024 11:42 AM  Performed by: Twyla Orozco APRN    Authorized by: Twyla Orozco APRN  Comparison: compared with previous ECG from 1/25/2024  Similar to previous ECG  Rhythm: sinus rhythm  Rate: normal  BPM: 57  ST Segments: ST segments normal      Left heart catheterization 3/8/2024:  Findings:  1. Coronary Artery Anatomy:  Dominance: Codominant small PDA from the right moderate-sized PDA from the left  Left Main: Graphically normal  Left Anterior Descending: Moderate caliber size.  Contains scattered 20% segment stenoses throughout.  Supplies a moderate-sized proximal diagonal branch which branches out to multiple branches does contain a 60% mid segment stenoses  Circumflex Artery: Moderate caliber size.  Contains a 20% proximal segment stenoses supplies a high marginal to 20% segment stenoses and a large posterior lateral branch circulation and possibly even a small PDA  Right Coronary Artery: Small in caliber size.  The proximal segment contains scattered 40% segment stenoses may be supplies a very small PDA and posterolateral branch circulation     2. Hemodynamics:  Left " Ventricle: 99/6/11 mmHg  Aorta: 93/47/61 mmHg     Conclusions:  Moderate coronary artery disease noted within the LAD at 20% and a 60% mid diagonal branch segment stenoses and a small vessel, 20% proximal circumflex segment stenoses, 40% proximal RCA segment stenoses.  Normal LVEDP of 11 mmHg    Stress MPI 2/14/2024:    Impressions are consistent with an intermediate risk study.    Left ventricular ejection fraction is hyperdynamic (Calculated EF > 70%).    Myocardial perfusion imaging indicates a small-sized, mildly severe area of ischemia located in the anterior wall.    LVEF with stress is 80%.    Stress MPI 4/30/2021:  There is a very small, medium intensity reversible defect in the distal inferior wall that may represent ischemia. The study was technically difficult due to profound intense subdiaphragmatic GI uptake of the radiotracer.  Left ventricular ejection fraction is hyperdynamic (Calculated EF > 70%).        Assessment:          Diagnosis Plan   1. Coronary artery disease of native artery of native heart with stable angina pectoris  ECG 12 Lead             Plan:       Coronary artery disease - history of POBA in setting of MI.  Recent left heart cath with moderate disease.  Continue DAPT, beta-blocker, statin.  He is not reporting chest pain, in the future could look at adding nitrate or amlodipine with symptoms  Hypertension -well-controlled  Mixed hyperlipidemia -continue statin.  Most recent labs are at goal with normal AST and ALT    Patient is seen today for follow-up.  He is stable from a cardiac perspective.  He denies symptoms today.  We discussed follow-up schedule and he prefers 6 months.  He will call with concerns      Orders Placed This Encounter   Procedures    ECG 12 Lead     This order was created via procedure documentation     Order Specific Question:   Release to patient     Answer:   Routine Release [[id number]]            EDWARD Barger  [facility]  03/22/24  11:52 EDT

## 2024-05-01 RX ORDER — ATORVASTATIN CALCIUM 20 MG/1
TABLET, FILM COATED ORAL
Qty: 90 TABLET | Refills: 1 | Status: SHIPPED | OUTPATIENT
Start: 2024-05-01

## 2024-05-03 RX ORDER — MONTELUKAST SODIUM 10 MG/1
10 TABLET ORAL
Qty: 90 TABLET | Refills: 0 | Status: SHIPPED | OUTPATIENT
Start: 2024-05-03

## 2024-05-17 DIAGNOSIS — E11.9 TYPE 2 DIABETES MELLITUS WITHOUT COMPLICATION, WITHOUT LONG-TERM CURRENT USE OF INSULIN: ICD-10-CM

## 2024-05-17 NOTE — TELEPHONE ENCOUNTER
"  Caller: Raymundo Lopez \"Julien\"    Relationship: Self    Best call back number: [phone]    Requested Prescriptions:   Requested Prescriptions     Pending Prescriptions Disp Refills    metFORMIN (GLUCOPHAGE) 500 MG tablet       Sig: Take 1 tablet by mouth 2 (Two) Times a Day With Meals.        Pharmacy where request should be sent: [facility]Y #[id number] - [place], [place] - [address] [facility] [address] [phone] [facility] [phone]      Last office visit with prescribing clinician: 11/28/2023   Last telemedicine visit with prescribing clinician: Visit date not found   Next office visit with prescribing clinician: 6/5/2024     Additional details provided by patient: STATED THAT THEY HAVE BEEN TAKING THE MEDICATION FOR A WHILE AND WERE INFORMED BY THE PHARMACY THAT THE REQUESTS THEY WERE SENDING HAVE NOT BEEN ANSWERED. STATED THAT THEY WOULD LIKE TO GET THIS CALLED IN IF DR. ALEXANDER FEELS THEY STILL NEED THE MEDICATION. STATED THAT THEY HAVE BEEN OUT OF THE MEDICATION FOR A WHILE NOW    Does the patient have less than a 3 day supply:  [x] Yes  [] No    Would you like a call back once the refill request has been completed: [x] Yes [] No    If the office needs to give you a call back, can they leave a voicemail: [x] Yes [] No    Laura Cummins Rep   05/17/24 15:42 EDT         "

## 2024-06-05 ENCOUNTER — OFFICE VISIT (OUTPATIENT)
Dept: INTERNAL MEDICINE | Facility: CLINIC | Age: 89
End: 2024-06-05
Payer: MEDICARE

## 2024-06-05 VITALS
OXYGEN SATURATION: 95 % | DIASTOLIC BLOOD PRESSURE: 80 MMHG | SYSTOLIC BLOOD PRESSURE: 140 MMHG | HEART RATE: 72 BPM | BODY MASS INDEX: 34.07 KG/M2 | RESPIRATION RATE: 18 BRPM | HEIGHT: 69 IN | WEIGHT: 230 LBS

## 2024-06-05 DIAGNOSIS — Z00.00 MEDICARE ANNUAL WELLNESS VISIT, SUBSEQUENT: Primary | ICD-10-CM

## 2024-06-05 DIAGNOSIS — E11.9 TYPE 2 DIABETES MELLITUS WITHOUT COMPLICATION, WITHOUT LONG-TERM CURRENT USE OF INSULIN: Chronic | ICD-10-CM

## 2024-06-05 DIAGNOSIS — E78.01 FAMILIAL HYPERCHOLESTEROLEMIA: Chronic | ICD-10-CM

## 2024-06-05 DIAGNOSIS — I10 ESSENTIAL HYPERTENSION: Chronic | ICD-10-CM

## 2024-06-05 DIAGNOSIS — I25.118 CORONARY ARTERY DISEASE OF NATIVE ARTERY OF NATIVE HEART WITH STABLE ANGINA PECTORIS: Chronic | ICD-10-CM

## 2024-06-05 PROCEDURE — 1159F MED LIST DOCD IN RCRD: CPT | Performed by: FAMILY MEDICINE

## 2024-06-05 PROCEDURE — 1170F FXNL STATUS ASSESSED: CPT | Performed by: FAMILY MEDICINE

## 2024-06-05 PROCEDURE — 1126F AMNT PAIN NOTED NONE PRSNT: CPT | Performed by: FAMILY MEDICINE

## 2024-06-05 PROCEDURE — G0439 PPPS, SUBSEQ VISIT: HCPCS | Performed by: FAMILY MEDICINE

## 2024-06-05 PROCEDURE — 99214 OFFICE O/P EST MOD 30 MIN: CPT | Performed by: FAMILY MEDICINE

## 2024-06-05 PROCEDURE — 1160F RVW MEDS BY RX/DR IN RCRD: CPT | Performed by: FAMILY MEDICINE

## 2024-06-05 NOTE — PROGRESS NOTES
The ABCs of the Annual Wellness Visit  Subsequent Medicare Wellness Visit    Subjective    Raymundo Lopez is a 89 y.o. male who presents for a Subsequent Medicare Wellness Visit.    The following portions of the patient's history were reviewed and   updated as appropriate: allergies, current medications, past family history, past medical history, past social history, past surgical history and problem list.    Compared to one year ago, the patient feels his physical   health is the same.    Compared to one year ago, the patient feels his mental   health is the same.    Recent Hospitalizations:  He was not admitted to the hospital during the last year.       Current Medical Providers:  Patient Care Team:  Oren Sahni MD as PCP - General (Family Medicine)  Jax Loomis MD as Consulting Physician (Cardiology)    Outpatient Medications Prior to Visit   Medication Sig Dispense Refill    aspirin 81 MG tablet Take 1 tablet by mouth 2 (Two) Times a Day.      atorvastatin (LIPITOR) 20 MG tablet TAKE 1 TABLET BY MOUTH EVERYDAY AT BEDTIME 90 tablet 1    clopidogrel (PLAVIX) 75 MG tablet TAKE 1 TABLET BY MOUTH EVERY DAY 90 tablet 3    losartan (COZAAR) 25 MG tablet TAKE 1 TABLET BY MOUTH EVERY DAY 90 tablet 3    metFORMIN (GLUCOPHAGE) 500 MG tablet Take 1 tablet by mouth 2 (Two) Times a Day With Meals. (Patient taking differently: Take 2 tablets by mouth 2 (Two) Times a Day With Meals.)      metoprolol tartrate (LOPRESSOR) 100 MG tablet TAKE 1 TABLET BY MOUTH TWICE A DAY (Patient taking differently: Take 0.5 tablets by mouth 2 (Two) Times a Day.) 180 tablet 3    montelukast (SINGULAIR) 10 MG tablet TAKE 1 TABLET BY MOUTH EVERY DAY AT NIGHT 90 tablet 0    nitroglycerin (Nitrostat) 0.4 MG SL tablet Place 1 tablet under the tongue Every 5 (Five) Minutes As Needed for Chest Pain. Take no more than 3 doses in 15 minutes. 25 tablet 3    omeprazole (priLOSEC) 20 MG capsule Take 1 capsule by mouth Daily.      SITagliptin  "(Sajanuvia) 100 MG tablet Take 0.5 tablets by mouth Daily. 45 tablet 1    Trelegy Ellipta 100-62.5-25 MCG/INH inhaler        No facility-administered medications prior to visit.       No opioid medication identified on active medication list. I have reviewed chart for other potential  high risk medication/s and harmful drug interactions in the elderly.        Aspirin is on active medication list. Aspirin use is indicated based on review of current medical condition/s. Pros and cons of this therapy have been discussed today. Benefits of this medication outweigh potential harm.  Patient has been encouraged to continue taking this medication.  .      Patient Active Problem List   Diagnosis    Essential hypertension    Familial hypercholesterolemia    Garcia's esophagus without dysplasia    Prostate cancer    Myocardial infarction    Coronary artery disease of native artery of native heart with stable angina pectoris    Moderate persistent asthma without complication    Type 2 diabetes mellitus without complication, without long-term current use of insulin    Chronic right hip pain    Abnormal nuclear stress test     Advance Care Planning  Advance Directive is not on file.  ACP discussion was held with the patient during this visit. Patient has an advance directive (not in EMR), copy requested.     Objective    Vitals:    06/05/24 1247   BP: 140/80   BP Location: Left arm   Patient Position: Sitting   Cuff Size: Small Adult   Pulse: 72   Resp: 18   SpO2: 95%   Weight: 104 kg (230 lb)   Height: 175.3 cm (69\")       Estimated body mass index is 33.97 kg/m² as calculated from the following:    Height as of this encounter: 175.3 cm (69\").    Weight as of this encounter: 104 kg (230 lb).    BMI is >= 30 and <35. (Class 1 Obesity). The following options were offered after discussion;: nutrition counseling/recommendations      Does the patient have evidence of cognitive impairment? No          HEALTH RISK ASSESSMENT    Smoking " Status:  Social History     Tobacco Use   Smoking Status Former   Smokeless Tobacco Never   Tobacco Comments    Caffeine use      Alcohol Consumption:  Social History     Substance and Sexual Activity   Alcohol Use No     Fall Risk Screen:    FAVIANADI Fall Risk Assessment was completed, and patient is at LOW risk for falls.Assessment completed on:2024    Depression Screenin/5/2024    12:49 PM   PHQ-2/PHQ-9 Depression Screening   Little Interest or Pleasure in Doing Things 0-->not at all   Feeling Down, Depressed or Hopeless 0-->not at all   PHQ-9: Brief Depression Severity Measure Score 0       Health Habits and Functional and Cognitive Screenin/5/2024    12:48 PM   Functional & Cognitive Status   Do you have difficulty preparing food and eating? No   Do you have difficulty bathing yourself, getting dressed or grooming yourself? No   Do you have difficulty using the toilet? No   Do you have difficulty moving around from place to place? No   Do you have trouble with steps or getting out of a bed or a chair? No   Current Diet Well Balanced Diet   Dental Exam Up to date   Eye Exam Up to date   Exercise (times per week) 0 times per week   Current Exercises Include No Regular Exercise   Do you need help using the phone?  No   Are you deaf or do you have serious difficulty hearing?  No   Do you need help to go to places out of walking distance? No   Do you need help shopping? No   Do you need help preparing meals?  No   Do you need help with housework?  No   Do you need help with laundry? No   Do you need help taking your medications? No   Do you need help managing money? No   Do you ever drive or ride in a car without wearing a seat belt? No   Have you felt unusual stress, anger or loneliness in the last month? No   Who do you live with? Alone   If you need help, do you have trouble finding someone available to you? No   Have you been bothered in the last four weeks by sexual problems? No   Do you  have difficulty concentrating, remembering or making decisions? No       Age-appropriate Screening Schedule:  Refer to the list below for future screening recommendations based on patient's age, sex and/or medical conditions. Orders for these recommended tests are listed in the plan section. The patient has been provided with a written plan.    Health Maintenance   Topic Date Due    RSV Vaccine - Adults (1 - 1-dose 60+ series) Never done    Pneumococcal Vaccine 65+ (2 of 2 - PPSV23 or PCV20) 12/26/2018    DIABETIC EYE EXAM  10/23/2021    COVID-19 Vaccine (4 - 2023-24 season) 09/01/2023    ANNUAL WELLNESS VISIT  01/09/2024    HEMOGLOBIN A1C  05/28/2024    LIPID PANEL  07/24/2024    URINE MICROALBUMIN  07/24/2024    INFLUENZA VACCINE  08/01/2024    BMI FOLLOWUP  06/05/2025    TDAP/TD VACCINES (2 - Td or Tdap) 10/17/2027    ZOSTER VACCINE  Completed                CMS Preventative Services Quick Reference  Risk Factors Identified During Encounter  Immunizations Discussed/Encouraged: Influenza, Prevnar 20 (Pneumococcal 20-valent conjugate), Shingrix and COVID19  The above risks/problems have been discussed with the patient.  Pertinent information has been shared with the patient in the After Visit Summary.  An After Visit Summary and PPPS were made available to the patient.    Follow Up:   Next Medicare Wellness visit to be scheduled in 1 year.       Additional E&M Note during same encounter follows:  Patient has multiple medical problems which are significant and separately identifiable that require additional work above and beyond the Medicare Wellness Visit.      Chief Complaint  Medicare Wellness-subsequent    Subjective        HPI  Raymundo Lopez is also being seen today for chronic management.    He is here today to follow-up on his hypertension, hyperlipidemia, diabetes and coronary artery disease. Overall he has been doing well. No new complaints regarding these conditions. Blood pressure controlled in the  "office today. Overall the diabetes has been controlled this year. He is taking his medication as prescribed below. Cholesterol was at goal for last check.       Current Outpatient Medications:     aspirin 81 MG tablet, Take 1 tablet by mouth 2 (Two) Times a Day., Disp: , Rfl:     atorvastatin (LIPITOR) 20 MG tablet, TAKE 1 TABLET BY MOUTH EVERYDAY AT BEDTIME, Disp: 90 tablet, Rfl: 1    clopidogrel (PLAVIX) 75 MG tablet, TAKE 1 TABLET BY MOUTH EVERY DAY, Disp: 90 tablet, Rfl: 3    losartan (COZAAR) 25 MG tablet, TAKE 1 TABLET BY MOUTH EVERY DAY, Disp: 90 tablet, Rfl: 3    metFORMIN (GLUCOPHAGE) 500 MG tablet, Take 1 tablet by mouth 2 (Two) Times a Day With Meals. (Patient taking differently: Take 2 tablets by mouth 2 (Two) Times a Day With Meals.), Disp: , Rfl:     metoprolol tartrate (LOPRESSOR) 100 MG tablet, TAKE 1 TABLET BY MOUTH TWICE A DAY (Patient taking differently: Take 0.5 tablets by mouth 2 (Two) Times a Day.), Disp: 180 tablet, Rfl: 3    montelukast (SINGULAIR) 10 MG tablet, TAKE 1 TABLET BY MOUTH EVERY DAY AT NIGHT, Disp: 90 tablet, Rfl: 0    nitroglycerin (Nitrostat) 0.4 MG SL tablet, Place 1 tablet under the tongue Every 5 (Five) Minutes As Needed for Chest Pain. Take no more than 3 doses in 15 minutes., Disp: 25 tablet, Rfl: 3    omeprazole (priLOSEC) 20 MG capsule, Take 1 capsule by mouth Daily., Disp: , Rfl:     SITagliptin (Januvia) 100 MG tablet, Take 0.5 tablets by mouth Daily., Disp: 45 tablet, Rfl: 1    Trelegy Ellipta 100-62.5-25 MCG/INH inhaler, , Disp: , Rfl:              Objective   Vital Signs:  /80 (BP Location: Left arm, Patient Position: Sitting, Cuff Size: Small Adult)   Pulse 72   Resp 18   Ht 175.3 cm (69\")   Wt 104 kg (230 lb)   SpO2 95%   BMI 33.97 kg/m²     Physical Exam  Vitals and nursing note reviewed.   Constitutional:       General: He is not in acute distress.     Appearance: Normal appearance.   Cardiovascular:      Rate and Rhythm: Normal rate and regular " rhythm.      Heart sounds: Normal heart sounds. No murmur heard.  Pulmonary:      Effort: Pulmonary effort is normal.      Breath sounds: Examination of the right-lower field reveals wheezing. Examination of the left-lower field reveals wheezing. Wheezing present.   Neurological:      Mental Status: He is alert.          The following data was reviewed by: Oren Sahni MD on 01/09/2023:  Common labs          7/24/2023    13:22 11/28/2023    12:19 3/7/2024    14:30   Common Labs   Glucose 110  111  112    BUN 17  20  21    Creatinine 1.23  1.23  1.18    Sodium 141  141  141    Potassium 4.8  4.7  4.7    Chloride 105  102  103    Calcium 9.5  9.8  9.4    Total Protein 6.4      Albumin 4.4      Total Bilirubin 0.4      Alkaline Phosphatase 93      AST (SGOT) 16      ALT (SGPT) 16      WBC 9.84   9.86    Hemoglobin 13.3   13.5    Hematocrit 41.0   41.8    Platelets 233   237    Total Cholesterol 126      Triglycerides 70      HDL Cholesterol 58      LDL Cholesterol  54      Hemoglobin A1C 6.80  6.50     Microalbumin, Urine 21.4                   Assessment and Plan   Diagnoses and all orders for this visit:    1. Medicare annual wellness visit, subsequent (Primary)    2. Essential hypertension  -     CBC (No Diff)  -     Comprehensive Metabolic Panel    3. Familial hypercholesterolemia  -     CBC (No Diff)  -     Comprehensive Metabolic Panel  -     Lipid Panel With LDL / HDL Ratio    4. Type 2 diabetes mellitus without complication, without long-term current use of insulin  -     Comprehensive Metabolic Panel  -     Hemoglobin A1c  -     Microalbumin / Creatinine Urine Ratio - Urine, Clean Catch    5. Coronary artery disease of native artery of native heart with stable angina pectoris  -     CBC (No Diff)  -     Comprehensive Metabolic Panel  -     Lipid Panel With LDL / HDL Ratio        Clinically stable chronic conditions as above.  Continue all medications as above.  Labs reviewed/ordered as above.          Follow Up   Return if symptoms worsen or fail to improve.  Patient was given instructions and counseling regarding his condition or for health maintenance advice. Please see specific information pulled into the AVS if appropriate.

## 2024-06-05 NOTE — PATIENT INSTRUCTIONS
"MyChart Tips:    [employer] can be a useful part of our patient care program and is a way for us to communicate lab results and for you to request refills.  Here is some information regarding the best way to use [employer] for communication.       Examples of medical issues that are APPROPRIATE for [employer]:  -Follow-up on problems we have already addressed in a visit such as home testing results, blood pressure readings, glucose readings  -Questions that can be answered with a simple \"yes\" or \"no\"  -Please keep communication concise and to the point.  All other types of communication should be held for an office visit.    Communication that is NOT APPROPRIATE for [employer]:  -New problems, serious problems or urgent problems.  Urgent matters should be addressed by phone for advice, in the office, urgent care or the ER.  -Requesting Paxlovid or Antibiotics: These types of problems require an evaluation unless your provider approved this previously.    -[employer] messages are not email.  Staff will check messages each weekday.  We strive for a 48-hour turnaround on messaging.    -[employer] is not for private issues.  Messages are received first by our office staff.    Please be mindful that sometimes it may take longer to receive a response on [employer].  Many times of the year we have high volumes of messages coming into physician inboxes.      Please also be mindful that [employer] messages become part of your permanent medical record.    We appreciate your respect for the limitations and boundaries of [employer].      Lab Results:  Please allow up to 7 business days for lab results to be sent through [employer] to you before contacting your provider.  Sometimes we are waiting for results to get back from the lab and also your provider needs time to analyze them thoroughly before making recommendations.  Also, providers may be out of the office on vacation.      While the internet has great resources, it is not a substitute for " interpreting lab results.

## 2024-06-06 LAB
ALBUMIN SERPL-MCNC: 3.9 G/DL (ref 3.7–4.7)
ALBUMIN/CREAT UR: 21 MG/G CREAT (ref 0–29)
ALBUMIN/GLOB SERPL: 1.4 {RATIO} (ref 1.2–2.2)
ALP SERPL-CCNC: 108 IU/L (ref 44–121)
ALT SERPL-CCNC: 17 IU/L (ref 0–44)
AST SERPL-CCNC: 18 IU/L (ref 0–40)
BILIRUB SERPL-MCNC: 0.5 MG/DL (ref 0–1.2)
BUN SERPL-MCNC: 21 MG/DL (ref 8–27)
BUN/CREAT SERPL: 16 (ref 10–24)
CALCIUM SERPL-MCNC: 9.2 MG/DL (ref 8.6–10.2)
CHLORIDE SERPL-SCNC: 104 MMOL/L (ref 96–106)
CHOLEST SERPL-MCNC: 127 MG/DL (ref 100–199)
CO2 SERPL-SCNC: 23 MMOL/L (ref 20–29)
CREAT SERPL-MCNC: 1.32 MG/DL (ref 0.76–1.27)
CREAT UR-MCNC: 110 MG/DL
EGFRCR SERPLBLD CKD-EPI 2021: 52 ML/MIN/1.73
ERYTHROCYTE [DISTWIDTH] IN BLOOD BY AUTOMATED COUNT: 14.2 % (ref 11.6–15.4)
GLOBULIN SER CALC-MCNC: 2.7 G/DL (ref 1.5–4.5)
GLUCOSE SERPL-MCNC: 118 MG/DL (ref 70–99)
HBA1C MFR BLD: 6.9 % (ref 4.8–5.6)
HCT VFR BLD AUTO: 40.4 % (ref 37.5–51)
HDLC SERPL-MCNC: 52 MG/DL
HGB BLD-MCNC: 12.5 G/DL (ref 13–17.7)
LDLC SERPL CALC-MCNC: 58 MG/DL (ref 0–99)
LDLC/HDLC SERPL: 1.1 RATIO (ref 0–3.6)
MCH RBC QN AUTO: 28.1 PG (ref 26.6–33)
MCHC RBC AUTO-ENTMCNC: 30.9 G/DL (ref 31.5–35.7)
MCV RBC AUTO: 91 FL (ref 79–97)
MICROALBUMIN UR-MCNC: 22.7 UG/ML
PLATELET # BLD AUTO: 248 X10E3/UL (ref 150–450)
POTASSIUM SERPL-SCNC: 4.9 MMOL/L (ref 3.5–5.2)
PROT SERPL-MCNC: 6.6 G/DL (ref 6–8.5)
RBC # BLD AUTO: 4.45 X10E6/UL (ref 4.14–5.8)
SODIUM SERPL-SCNC: 140 MMOL/L (ref 134–144)
TRIGL SERPL-MCNC: 88 MG/DL (ref 0–149)
VLDLC SERPL CALC-MCNC: 17 MG/DL (ref 5–40)
WBC # BLD AUTO: 10.7 X10E3/UL (ref 3.4–10.8)

## 2024-07-09 RX ORDER — CLOPIDOGREL BISULFATE 75 MG/1
TABLET ORAL
Qty: 90 TABLET | Refills: 3 | Status: SHIPPED | OUTPATIENT
Start: 2024-07-09

## 2024-07-30 RX ORDER — MONTELUKAST SODIUM 10 MG/1
10 TABLET ORAL
Qty: 90 TABLET | Refills: 0 | Status: SHIPPED | OUTPATIENT
Start: 2024-07-30

## 2024-08-05 ENCOUNTER — TELEPHONE (OUTPATIENT)
Dept: INTERNAL MEDICINE | Facility: CLINIC | Age: 89
End: 2024-08-05
Payer: MEDICARE

## 2024-08-05 NOTE — TELEPHONE ENCOUNTER
"  Caller: Raymundo Lopez \"Julien\"    Relationship: Self    Best call back number: [phone]    What was the call regarding: STATED THAT THEY WOULD LIKE TO SEE DR. CAMILO KAUR AS THEIR PROVIDER WHEN DR. ALEXANDER LEAVES. HUB DID NOT SHOW AN OPEN SCHEDULE FOR DR. KAUR AND ATTEMPTED TO WARM TRANSFER UNSUCCESSFUL. PLEASE CALL AND ADVISE    "

## 2024-08-06 DIAGNOSIS — E11.65 TYPE 2 DIABETES MELLITUS WITH HYPERGLYCEMIA, WITHOUT LONG-TERM CURRENT USE OF INSULIN: ICD-10-CM

## 2024-08-06 NOTE — TELEPHONE ENCOUNTER
"  Caller: Raymundo Lopez \"Julien\"    Relationship: Self    Best call back number: [phone]    Requested Prescriptions:   Requested Prescriptions     Pending Prescriptions Disp Refills    SITagliptin (Januvia) 100 MG tablet 45 tablet 1     Sig: Take 0.5 tablets by mouth Daily.        Pharmacy where request should be sent: [facility] #[id number] - [place], [place] - [address] [phone] [facility] [phone]      Last office visit with prescribing clinician: 6/5/2024   Last telemedicine visit with prescribing clinician: Visit date not found   Next office visit with prescribing clinician: Visit date not found     Additional details provided by patient: PATIENT HAS BEEN OUT FOR A FEW DAYS.    PATIENT STATED THAT PHARMACY HAS SENT REQUEST A COUPLE OF TIMES.    Does the patient have less than a 3 day supply:  [x] Yes  [] No    Would you like a call back once the refill request has been completed: [] Yes [x] No    If the office needs to give you a call back, can they leave a voicemail: [] Yes [x] No    Laura Matos Rep   08/06/24 11:17 EDT         "

## 2024-09-16 RX ORDER — LOSARTAN POTASSIUM 25 MG/1
TABLET ORAL
Qty: 90 TABLET | Refills: 3 | Status: SHIPPED | OUTPATIENT
Start: 2024-09-16

## 2024-10-03 ENCOUNTER — OFFICE VISIT (OUTPATIENT)
Age: 89
End: 2024-10-03
Payer: MEDICARE

## 2024-10-03 VITALS
BODY MASS INDEX: 35.1 KG/M2 | HEIGHT: 69 IN | HEART RATE: 66 BPM | DIASTOLIC BLOOD PRESSURE: 74 MMHG | WEIGHT: 237 LBS | SYSTOLIC BLOOD PRESSURE: 130 MMHG

## 2024-10-03 DIAGNOSIS — I48.91 ATRIAL FIBRILLATION, CONTROLLED: Primary | ICD-10-CM

## 2024-10-03 DIAGNOSIS — E78.01 FAMILIAL HYPERCHOLESTEROLEMIA: ICD-10-CM

## 2024-10-03 DIAGNOSIS — I10 ESSENTIAL HYPERTENSION: ICD-10-CM

## 2024-10-03 DIAGNOSIS — I25.118 CORONARY ARTERY DISEASE OF NATIVE ARTERY OF NATIVE HEART WITH STABLE ANGINA PECTORIS: ICD-10-CM

## 2024-10-03 PROCEDURE — 99214 OFFICE O/P EST MOD 30 MIN: CPT | Performed by: INTERNAL MEDICINE

## 2024-10-03 PROCEDURE — 1159F MED LIST DOCD IN RCRD: CPT | Performed by: INTERNAL MEDICINE

## 2024-10-03 PROCEDURE — 1160F RVW MEDS BY RX/DR IN RCRD: CPT | Performed by: INTERNAL MEDICINE

## 2024-10-03 PROCEDURE — 93000 ELECTROCARDIOGRAM COMPLETE: CPT | Performed by: INTERNAL MEDICINE

## 2024-10-03 RX ORDER — ASPIRIN 81 MG/1
81 TABLET ORAL DAILY
Start: 2024-10-03

## 2024-10-03 NOTE — PROGRESS NOTES
[facility] Follow Up Office Note     Encounter Date:10/03/24  Patient:Raymundo Lopez  :1935  MRN:[id number]      Chief Complaint: Follow up on CAD    Chief Complaint   Patient presents with    Coronary Artery Disease     6 month f/u     History of Presenting Illness:      Mr. Lopez is a 89 y.o. gentleman with past medical history notable for coronary artery disease status post balloon angioplasty of a diagonal branch in the setting of an acute myocardial infarction, hypertension, mixed hyperlipidemia, and chronic lung disease who presents to our office for scheduled follow up.  I last saw him for heart catheterization a few months back due to an abnormal stress test fortunately the coronary disease was fairly stable no revascularization targets noted since then he has been doing fairly well.  Today he presented to the office in new onset atrial fibrillation. He was unaware that he was in      Review of Systems:  Review of Systems   Constitutional: Positive for malaise/fatigue.   HENT: Negative.     Eyes: Negative.    Cardiovascular:  Positive for chest pain.   Respiratory: Negative.     Endocrine: Negative.    Hematologic/Lymphatic: Negative.    Skin: Negative.    Musculoskeletal: Negative.    Gastrointestinal: Negative.    Genitourinary: Negative.    Neurological: Negative.    Psychiatric/Behavioral: Negative.     Allergic/Immunologic: Negative.        Current Outpatient Medications on File Prior to Visit   Medication Sig Dispense Refill    atorvastatin (LIPITOR) 20 MG tablet TAKE 1 TABLET BY MOUTH EVERYDAY AT BEDTIME 90 tablet 1    losartan (COZAAR) 25 MG tablet TAKE 1 TABLET BY MOUTH EVERY DAY 90 tablet 3    metFORMIN (GLUCOPHAGE) 500 MG tablet Take 1 tablet by mouth 2 (Two) Times a Day With Meals. (Patient taking differently: Take 2 tablets by mouth 2 (Two) Times a Day With Meals.)      metoprolol tartrate (LOPRESSOR) 100 MG tablet TAKE 1 TABLET BY MOUTH TWICE A DAY (Patient taking  differently: Take 0.5 tablets by mouth 2 (Two) Times a Day.) 180 tablet 3    montelukast (SINGULAIR) 10 MG tablet TAKE 1 TABLET BY MOUTH EVERY DAY AT NIGHT 90 tablet 0    nitroglycerin (Nitrostat) 0.4 MG SL tablet Place 1 tablet under the tongue Every 5 (Five) Minutes As Needed for Chest Pain. Take no more than 3 doses in 15 minutes. 25 tablet 3    omeprazole (priLOSEC) 20 MG capsule Take 1 capsule by mouth Daily.      SITagliptin (Januvia) 100 MG tablet Take 0.5 tablets by mouth Daily. 45 tablet 1    Trelegy Ellipta 100-62.5-25 MCG/INH inhaler       [DISCONTINUED] aspirin 81 MG tablet Take 1 tablet by mouth 2 (Two) Times a Day.      [DISCONTINUED] clopidogrel (PLAVIX) 75 MG tablet TAKE 1 TABLET BY MOUTH EVERY DAY 90 tablet 3     No current facility-administered medications on file prior to visit.       No Known Allergies    Past Medical History:   Diagnosis Date    Angina pectoris     Arthritis     Asthma     Garcia esophagus     CAD (coronary artery disease)     Cancer     prostate    Cataract     Emphysema of lung     H/O electrocardiogram     Health care maintenance     History of EKG 06/17/2015    Hyperlipidemia     Hypertension     Myocardial infarction     Shortness of breath        Past Surgical History:   Procedure Laterality Date    ANAL FISTULA REPAIR      CARDIAC CATHETERIZATION N/A 3/8/2024    Procedure: Left Heart Cath;  Surgeon: Jax Loomis MD;  Location: [facility] CATH INVASIVE LOCATION;  Service: Cardiovascular;  Laterality: N/A;    COLONOSCOPY N/A 3/29/2017    Procedure: COLONOSCOPY TO CECUM & T.I. WITH COLD POLYPECTOMIES,  HOT SNARE POLYPECTOMIES;  Surgeon: Oren HARPER MD;  Location: [facility] ENDOSCOPY;  Service:     ENDOSCOPY N/A 11/29/2016    Procedure: ESOPHAGOGASTRODUODENOSCOPY WITH BIOPSIES;  Surgeon: Oren HARPER MD;  Location: [facility] ENDOSCOPY;  Service:     PROSTATECTOMY         Social History     Socioeconomic History    Marital status:    Tobacco Use    Smoking  "status: Former    Smokeless tobacco: Never    Tobacco comments:     Caffeine use    Vaping Use    Vaping status: Never Used   Substance and Sexual Activity    Alcohol use: No    Drug use: No    Sexual activity: Defer       History reviewed. No pertinent family history.    The following portions of the patient's history were reviewed and updated as appropriate: allergies, current medications, past family history, past medical history, past social history, past surgical history and problem list.       Objective:       Vitals:    10/03/24 1225   BP: 130/74   BP Location: Left arm   Patient Position: Sitting   Pulse: 66   Weight: 108 kg (237 lb)   Height: 175.3 cm (69\")       Body mass index is 35 kg/m².     Physical Exam:  Constitutional: Well appearing, Well-developed, No acute distress   HENT: Oropharynx clear and membrane moist  Eyes: Normal conjunctiva, no sclera icterus.  Neck: Supple, no carotid bruit bilaterally.  Cardiovascular: Irregularly irregular rate and rhythm, No Murmur, No bilateral lower extremity edema.  Pulmonary: Normal respiratory effort, normal lung sounds, no wheezing.  Neurological: Alert and orient x 3.   Skin: Warm, dry, no ecchymosis, no rash.  Psych: Appropriate mood and affect. Normal judgment and insight.         Lab Results   Component Value Date    GLUCOSE 118 (H) 06/05/2024    BUN 21 06/05/2024    CREATININE 1.32 (H) 06/05/2024    EGFRIFNONA 76 10/05/2021    EGFRIFAFRI 93 10/05/2021    BCR 16 06/05/2024    K 4.9 06/05/2024    CO2 23 06/05/2024    CALCIUM 9.2 06/05/2024    PROTENTOTREF 6.6 06/05/2024    ALBUMIN 3.9 06/05/2024    LABIL2 1.4 06/05/2024    AST 18 06/05/2024    ALT 17 06/05/2024       Lab Results   Component Value Date    WBC 10.7 06/05/2024    HGB 12.5 (L) 06/05/2024    HCT 40.4 06/05/2024    MCV 91 06/05/2024     06/05/2024       No results found for: \"CKTOTAL\", \"CKMB\", \"CKMBINDEX\", \"TROPONINI\", \"TROPONINT\"    No results found for: \"PROBNP\"    Lab Results "   Component Value Date    CHOL 125 10/09/2019    CHLPL 127 06/05/2024    CHLPL 126 07/24/2023    CHLPL 117 04/15/2022     Lab Results   Component Value Date    TRIG 88 06/05/2024    TRIG 70 07/24/2023    TRIG 70 04/15/2022     Lab Results   Component Value Date    HDL 52 06/05/2024    HDL 58 07/24/2023    HDL 50 04/15/2022     Lab Results   Component Value Date    LDL 58 06/05/2024    LDL 54 07/24/2023    LDL 52 04/15/2022       Lab Results   Component Value Date    TSH 2.120 07/24/2023           ECG 12 Lead    Date/Time: 10/3/2024 1:35 PM  Performed by: Jax Loomis MD    Authorized by: Jax Loomis MD  Comparison: compared with previous ECG from 3/22/2024  Comparison to previous ECG: Atrial fibrillation has replaced sinus rhythm  Rhythm: atrial fibrillation          Cardiac Catheterization 3/8/2024  Moderate coronary artery disease noted within the LAD at 20% and a 60% mid diagonal branch segment stenoses and a small vessel, 20% proximal circumflex segment stenoses, 40% proximal RCA segment stenoses.  Normal LVEDP of 11 mmHg    Stress MPI 2//2024:  Impressions are consistent with an intermediate risk study.  Left ventricular ejection fraction is hyperdynamic (Calculated EF > 70%).  Myocardial perfusion imaging indicates a small-sized, mildly severe area of ischemia located in the anterior wall.  LVEF with stress is 80%.    Stress MPI 4/30/2021:  There is a very small, medium intensity reversible defect in the distal inferior wall that may represent ischemia. The study was technically difficult due to profound intense subdiaphragmatic GI uptake of the radiotracer.  Left ventricular ejection fraction is hyperdynamic (Calculated EF > 70%).        Assessment:          Diagnosis Plan   1. Atrial fibrillation, controlled  Adult Transthoracic Echo Complete W/ Cont if Necessary Per Protocol    apixaban (ELIQUIS) 5 MG tablet tablet    ECG 12 Lead      2. Coronary artery disease of native artery of native heart  with stable angina pectoris  Adult Transthoracic Echo Complete W/ Cont if Necessary Per Protocol      3. Essential hypertension        4. Familial hypercholesterolemia                   Plan:       Mr. Lopez is a 89 y.o. gentleman with past medical history notable for coronary artery disease status post balloon angioplasty of a diagonal branch in the setting of an acute myocardial infarction, hypertension, mixed hyperlipidemia, and chronic lung disease who presents to our office for scheduled follow-up.  Overall is clinically doing well unfortunately does have new onset atrial fibrillation.  I would like to get an echocardiogram to rule out any structural abnormalities which could be contributing would not surprise me given his age and other comorbidities that this is developed but fortunately has no major symptoms.  No major changes are needed with his medicine outside of adjusting his antiplatelet regimen as he does need anticoagulation now.  I would have him decrease his aspirin from 162 mg down to 81 mg and stop his clopidogrel as we will start Eliquis 5 mg twice daily      Paroxysmal Atrial Fibrillation:  Asymptomatic  Well rate controlled on current therapy no changes  [id number] score 5 will start Eliquis 5 mg twice daily  Follow-up echocardiogram    Coronary artery disease with angina:  Continue aspirin   We will stop clopidogrel given needs for anticoagulation  Continue metoprolol 100 mg  Continue statin    Hypertension:  Well controlled    Mixed Hyperlipidemia:  Continue statin   Lipid panel 6/2024 demonstrates normal well controlled LDL and Total Cholesterol  CMP 6/2024 normal ALT and AST      Follow up:  6 months      Jax Loomis MD  [facility]  10/03/24  13:36 EDT

## 2024-10-24 ENCOUNTER — HOSPITAL ENCOUNTER (OUTPATIENT)
Dept: CARDIOLOGY | Facility: HOSPITAL | Age: 89
Discharge: HOME | End: 2024-10-24
Admitting: INTERNAL MEDICINE
Payer: MEDICARE

## 2024-10-24 VITALS
BODY MASS INDEX: 35.1 KG/M2 | HEIGHT: 69 IN | WEIGHT: 237 LBS | DIASTOLIC BLOOD PRESSURE: 65 MMHG | SYSTOLIC BLOOD PRESSURE: 120 MMHG | HEART RATE: 88 BPM

## 2024-10-24 DIAGNOSIS — I48.91 ATRIAL FIBRILLATION, CONTROLLED: ICD-10-CM

## 2024-10-24 DIAGNOSIS — I25.118 CORONARY ARTERY DISEASE OF NATIVE ARTERY OF NATIVE HEART WITH STABLE ANGINA PECTORIS: ICD-10-CM

## 2024-10-24 LAB
AORTIC ARCH: 2.9 CM
AORTIC DIMENSIONLESS INDEX: 0.8 (DI)
ASCENDING AORTA: 3.5 CM
BH CV ECHO HCM PROVOKED PEAK GRADIENT: 25 MMHG
BH CV ECHO LEFT VENTRICLE GLOBAL LONGITUDINAL STRAIN: -20.8 %
BH CV ECHO MEAS - ACS: 1.9 CM
BH CV ECHO MEAS - AO MAX PG: 5.6 MMHG
BH CV ECHO MEAS - AO MEAN PG: 3 MMHG
BH CV ECHO MEAS - AO ROOT AREA (BSA CORRECTED): 1.4 CM2
BH CV ECHO MEAS - AO ROOT DIAM: 3.1 CM
BH CV ECHO MEAS - AO V2 MAX: 118 CM/SEC
BH CV ECHO MEAS - AO V2 VTI: 24.5 CM
BH CV ECHO MEAS - AVA(I,D): 2.5 CM2
BH CV ECHO MEAS - EDV(CUBED): 58.6 ML
BH CV ECHO MEAS - EDV(MOD-SP2): 109 ML
BH CV ECHO MEAS - EDV(MOD-SP4): 89 ML
BH CV ECHO MEAS - EF(MOD-BP): 70.9 %
BH CV ECHO MEAS - EF(MOD-SP2): 72.5 %
BH CV ECHO MEAS - EF(MOD-SP4): 68.5 %
BH CV ECHO MEAS - ESV(CUBED): 15.1 ML
BH CV ECHO MEAS - ESV(MOD-SP2): 30 ML
BH CV ECHO MEAS - ESV(MOD-SP4): 28 ML
BH CV ECHO MEAS - FS: 36.4 %
BH CV ECHO MEAS - IVS/LVPW: 1.15 CM
BH CV ECHO MEAS - IVSD: 1.4 CM
BH CV ECHO MEAS - LAT PEAK E' VEL: 10.3 CM/SEC
BH CV ECHO MEAS - LV DIASTOLIC VOL/BSA (35-75): 40.1 CM2
BH CV ECHO MEAS - LV MASS(C)D: 180.7 GRAMS
BH CV ECHO MEAS - LV MAX PG: 4.4 MMHG
BH CV ECHO MEAS - LV MEAN PG: 2 MMHG
BH CV ECHO MEAS - LV SYSTOLIC VOL/BSA (12-30): 12.6 CM2
BH CV ECHO MEAS - LV V1 MAX: 105 CM/SEC
BH CV ECHO MEAS - LV V1 VTI: 19.6 CM
BH CV ECHO MEAS - LVIDD: 3.9 CM
BH CV ECHO MEAS - LVIDS: 2.47 CM
BH CV ECHO MEAS - LVOT AREA: 3.2 CM2
BH CV ECHO MEAS - LVOT DIAM: 2 CM
BH CV ECHO MEAS - LVPWD: 1.22 CM
BH CV ECHO MEAS - MED PEAK E' VEL: 8.7 CM/SEC
BH CV ECHO MEAS - MV DEC SLOPE: 650.9 CM/SEC2
BH CV ECHO MEAS - MV DEC TIME: 0.14 SEC
BH CV ECHO MEAS - MV E MAX VEL: 126.3 CM/SEC
BH CV ECHO MEAS - MV MAX PG: 9.1 MMHG
BH CV ECHO MEAS - MV MEAN PG: 2.37 MMHG
BH CV ECHO MEAS - MV P1/2T: 68 MSEC
BH CV ECHO MEAS - MV V2 VTI: 34.2 CM
BH CV ECHO MEAS - MVA(P1/2T): 3.2 CM2
BH CV ECHO MEAS - MVA(VTI): 1.81 CM2
BH CV ECHO MEAS - PA ACC TIME: 0.12 SEC
BH CV ECHO MEAS - PA V2 MAX: 87 CM/SEC
BH CV ECHO MEAS - PI END-D VEL: 95.5 CM/SEC
BH CV ECHO MEAS - PULM DIAS VEL: 108.7 CM/SEC
BH CV ECHO MEAS - PULM S/D: 0.24
BH CV ECHO MEAS - PULM SYS VEL: 26.6 CM/SEC
BH CV ECHO MEAS - QP/QS: 1.11
BH CV ECHO MEAS - RAP SYSTOLE: 3 MMHG
BH CV ECHO MEAS - RV MAX PG: 1.86 MMHG
BH CV ECHO MEAS - RV V1 MAX: 68.1 CM/SEC
BH CV ECHO MEAS - RV V1 VTI: 16.1 CM
BH CV ECHO MEAS - RVOT DIAM: 2.32 CM
BH CV ECHO MEAS - RVSP: 41 MMHG
BH CV ECHO MEAS - SUP REN AO DIAM: 2 CM
BH CV ECHO MEAS - SV(LVOT): 61.8 ML
BH CV ECHO MEAS - SV(MOD-SP2): 79 ML
BH CV ECHO MEAS - SV(MOD-SP4): 61 ML
BH CV ECHO MEAS - SV(RVOT): 68.5 ML
BH CV ECHO MEAS - SVI(LVOT): 27.8 ML/M2
BH CV ECHO MEAS - SVI(MOD-SP2): 35.6 ML/M2
BH CV ECHO MEAS - SVI(MOD-SP4): 27.5 ML/M2
BH CV ECHO MEAS - TAPSE (>1.6): 1.75 CM
BH CV ECHO MEAS - TR MAX PG: 38 MMHG
BH CV ECHO MEAS - TR MAX VEL: 308.4 CM/SEC
BH CV ECHO MEASUREMENTS AVERAGE E/E' RATIO: 13.29
BH CV XLRA - RV BASE: 4 CM
BH CV XLRA - RV LENGTH: 6.8 CM
BH CV XLRA - RV MID: 4.4 CM
BH CV XLRA - TDI S': 12.7 CM/SEC
LEFT ATRIUM VOLUME INDEX: 33.6 ML/M2
SINUS: 3.5 CM
STJ: 2.7 CM

## 2024-10-24 PROCEDURE — 93306 TTE W/DOPPLER COMPLETE: CPT

## 2024-10-24 PROCEDURE — 93356 MYOCRD STRAIN IMG SPCKL TRCK: CPT

## 2024-10-24 PROCEDURE — 25510000001 PERFLUTREN 6.52 MG/ML SUSPENSION 2 ML VIAL: Performed by: INTERNAL MEDICINE

## 2024-10-24 RX ADMIN — PERFLUTREN 2 ML: 6.52 INJECTION, SUSPENSION INTRAVENOUS at 13:44

## 2024-10-28 RX ORDER — ATORVASTATIN CALCIUM 20 MG/1
20 TABLET, FILM COATED ORAL
Qty: 90 TABLET | Refills: 0 | Status: SHIPPED | OUTPATIENT
Start: 2024-10-28

## 2024-10-28 RX ORDER — MONTELUKAST SODIUM 10 MG/1
10 TABLET ORAL
Qty: 90 TABLET | Refills: 0 | Status: SHIPPED | OUTPATIENT
Start: 2024-10-28

## 2024-11-04 ENCOUNTER — OFFICE VISIT (OUTPATIENT)
Dept: INTERNAL MEDICINE | Facility: CLINIC | Age: 89
End: 2024-11-04
Payer: MEDICARE

## 2024-11-04 VITALS
TEMPERATURE: 97.5 F | BODY MASS INDEX: 34.96 KG/M2 | HEART RATE: 73 BPM | SYSTOLIC BLOOD PRESSURE: 142 MMHG | DIASTOLIC BLOOD PRESSURE: 82 MMHG | WEIGHT: 236 LBS | HEIGHT: 69 IN | OXYGEN SATURATION: 96 %

## 2024-11-04 DIAGNOSIS — I48.91 ATRIAL FIBRILLATION, CONTROLLED: ICD-10-CM

## 2024-11-04 DIAGNOSIS — Z23 NEED FOR VACCINATION FOR STREP PNEUMONIAE: ICD-10-CM

## 2024-11-04 DIAGNOSIS — D64.9 NORMOCYTIC ANEMIA: ICD-10-CM

## 2024-11-04 DIAGNOSIS — I10 ESSENTIAL HYPERTENSION: Chronic | ICD-10-CM

## 2024-11-04 DIAGNOSIS — H61.23 BILATERAL IMPACTED CERUMEN: ICD-10-CM

## 2024-11-04 DIAGNOSIS — E11.9 TYPE 2 DIABETES MELLITUS WITHOUT COMPLICATION, WITHOUT LONG-TERM CURRENT USE OF INSULIN: Chronic | ICD-10-CM

## 2024-11-04 DIAGNOSIS — Z76.89 ENCOUNTER TO ESTABLISH CARE WITH NEW DOCTOR: Primary | ICD-10-CM

## 2024-11-04 DIAGNOSIS — I25.118 CORONARY ARTERY DISEASE OF NATIVE ARTERY OF NATIVE HEART WITH STABLE ANGINA PECTORIS: Chronic | ICD-10-CM

## 2024-11-04 DIAGNOSIS — E78.01 FAMILIAL HYPERCHOLESTEROLEMIA: Chronic | ICD-10-CM

## 2024-11-04 PROCEDURE — 90677 PCV20 VACCINE IM: CPT | Performed by: STUDENT IN AN ORGANIZED HEALTH CARE EDUCATION/TRAINING PROGRAM

## 2024-11-04 PROCEDURE — G0009 ADMIN PNEUMOCOCCAL VACCINE: HCPCS | Performed by: STUDENT IN AN ORGANIZED HEALTH CARE EDUCATION/TRAINING PROGRAM

## 2024-11-04 PROCEDURE — 99214 OFFICE O/P EST MOD 30 MIN: CPT | Performed by: STUDENT IN AN ORGANIZED HEALTH CARE EDUCATION/TRAINING PROGRAM

## 2024-11-04 PROCEDURE — 1160F RVW MEDS BY RX/DR IN RCRD: CPT | Performed by: STUDENT IN AN ORGANIZED HEALTH CARE EDUCATION/TRAINING PROGRAM

## 2024-11-04 PROCEDURE — 1126F AMNT PAIN NOTED NONE PRSNT: CPT | Performed by: STUDENT IN AN ORGANIZED HEALTH CARE EDUCATION/TRAINING PROGRAM

## 2024-11-04 PROCEDURE — 1159F MED LIST DOCD IN RCRD: CPT | Performed by: STUDENT IN AN ORGANIZED HEALTH CARE EDUCATION/TRAINING PROGRAM

## 2024-11-04 NOTE — PROGRESS NOTES
"Chief Complaint  Establish Care (Pt transfer care from Dr. Sahni. Pt would like to have his right ear looked at.) and Hypertension (Pt has no concerns.)    Subjective        Raymundo Lopez presents to clinic today to establish care with a new provider. Patient was previously following with Dr. Sahni.    Overall patient states that he feels well and has no acute complaints. Taking all medications as directed without any noticeable side effects. Does note some fullness of the right ear as if he had just recently taken a flight (no recent travel reported). Denies symptoms of ear pain or congestion. States that he has some occasional CP at times that is transient and chronic. Follows with outpatient cardiology for a fib and CAD with angina.    Objective   Vital Signs:  /82   Pulse 73   Temp 97.5 °F (36.4 °C)   Ht 175.3 cm (69\")   Wt 107 kg (236 lb)   SpO2 96%   BMI 34.85 kg/m²   Estimated body mass index is 34.85 kg/m² as calculated from the following:    Height as of this encounter: 175.3 cm (69\").    Weight as of this encounter: 107 kg (236 lb).    Physical Exam  Constitutional:       General: He is not in acute distress.     Appearance: Normal appearance.   HENT:      Right Ear: There is impacted cerumen.      Left Ear: There is impacted cerumen.      Nose: No congestion.   Cardiovascular:      Rate and Rhythm: Normal rate and regular rhythm.      Heart sounds: No murmur heard.  Pulmonary:      Effort: Pulmonary effort is normal.      Breath sounds: Normal breath sounds. No wheezing.   Musculoskeletal:         General: No swelling or deformity. Normal range of motion.   Skin:     General: Skin is warm and dry.   Neurological:      General: No focal deficit present.      Mental Status: He is oriented to person, place, and time. Mental status is at baseline.   Psychiatric:         Mood and Affect: Mood normal.         Behavior: Behavior normal.        Result Review :  The following data was reviewed by: " Kathy Owens MD on 11/04/2024:  CMP          11/28/2023    12:19 3/7/2024    14:30 6/5/2024    13:21   CMP   Glucose 111  112  118    BUN 20  21  21    Creatinine 1.23  1.18  1.32    EGFR  60.1     Sodium 141  141  140    Potassium 4.7  4.7  4.9    Chloride 102  103  104    Calcium 9.8  9.4  9.2    Total Protein   6.6    Albumin   3.9    Globulin   2.7    Total Bilirubin   0.5    Alkaline Phosphatase   108    AST (SGOT)   18    ALT (SGPT)   17    BUN/Creatinine Ratio 16.3  17.8  16    Anion Gap  12.6       CBC          3/7/2024    14:30 6/5/2024    13:21   CBC   WBC 9.86  10.7    RBC 4.57  4.45    Hemoglobin 13.5  12.5    Hematocrit 41.8  40.4    MCV 91.5  91    MCH 29.5  28.1    MCHC 32.3  30.9    RDW 13.5  14.2    Platelets 237  248      Lipid Panel          6/5/2024    13:21   Lipid Panel   Total Cholesterol 127    Triglycerides 88    HDL Cholesterol 52    VLDL Cholesterol 17    LDL Cholesterol  58    LDL/HDL Ratio 1.1      Microalbumin          6/5/2024    13:21   Microalbumin   Microalbumin, Urine 22.7      Data reviewed : Consultant notes Cardiology note 10/3/2024          Current Outpatient Medications:     apixaban (ELIQUIS) 5 MG tablet tablet, Take 1 tablet by mouth Every 12 (Twelve) Hours., Disp: 180 tablet, Rfl: 3    aspirin 81 MG EC tablet, Take 1 tablet by mouth Daily., Disp: , Rfl:     atorvastatin (LIPITOR) 20 MG tablet, Take 1 tablet by mouth every night at bedtime., Disp: 90 tablet, Rfl: 0    losartan (COZAAR) 25 MG tablet, TAKE 1 TABLET BY MOUTH EVERY DAY, Disp: 90 tablet, Rfl: 3    metFORMIN (GLUCOPHAGE) 500 MG tablet, Take 2 tablets by mouth 2 (Two) Times a Day With Meals., Disp: , Rfl:     metoprolol tartrate (LOPRESSOR) 100 MG tablet, TAKE 1 TABLET BY MOUTH TWICE A DAY (Patient taking differently: Take 0.5 tablets by mouth 2 (Two) Times a Day.), Disp: 180 tablet, Rfl: 3    montelukast (SINGULAIR) 10 MG tablet, Take 1 tablet by mouth every night at bedtime., Disp: 90 tablet, Rfl: 0     omeprazole (priLOSEC) 20 MG capsule, Take 1 capsule by mouth Daily., Disp: , Rfl:     SITagliptin (Januvia) 100 MG tablet, Take 0.5 tablets by mouth Daily., Disp: 45 tablet, Rfl: 1    Trelegy Ellipta 100-62.5-25 MCG/INH inhaler, , Disp: , Rfl:     carbamide peroxide (Debrox) 6.5 % otic solution, Administer 5 drops into both ears 2 (Two) Times a Day for 4 days., Disp: 2 mL, Rfl: 0    nitroglycerin (Nitrostat) 0.4 MG SL tablet, Place 1 tablet under the tongue Every 5 (Five) Minutes As Needed for Chest Pain. Take no more than 3 doses in 15 minutes. (Patient not taking: Reported on 11/4/2024), Disp: 25 tablet, Rfl: 3        Assessment and Plan   Diagnoses and all orders for this visit:    1. Encounter to establish care with new doctor (Primary)  -     Comprehensive Metabolic Panel  -     Lipid Panel  -     Hemoglobin A1c  -     CBC w AUTO Differential    2. Essential hypertension  -     Comprehensive Metabolic Panel    3. Familial hypercholesterolemia  -     Lipid Panel    4. Coronary artery disease of native artery of native heart with stable angina pectoris    5. Atrial fibrillation, controlled  Assessment & Plan:  Follows with outpatient cardiology   Continue BB and Eliquis as prescribed      6. Type 2 diabetes mellitus without complication, without long-term current use of insulin  -     Hemoglobin A1c  -     metFORMIN (GLUCOPHAGE) 500 MG tablet; Take 2 tablets by mouth 2 (Two) Times a Day With Meals.    7. Normocytic anemia  -     CBC w AUTO Differential    8. Bilateral impacted cerumen  Assessment & Plan:  Will schedule MA ear lavage appointment    Orders:  -     carbamide peroxide (Debrox) 6.5 % otic solution; Administer 5 drops into both ears 2 (Two) Times a Day for 4 days.  Dispense: 2 mL; Refill: 0    9. Need for vaccination for Strep pneumoniae  -     Pneumococcal Conjugate Vaccine 20-Valent (PCV20)             Follow Up   Return in about 6 months (around 5/4/2025) for Medicare Wellness and lab  work.    Patient was given instructions and counseling regarding his condition or for health maintenance advice. Please see specific information pulled into the AVS if appropriate.     Kathy Owens MD

## 2024-11-04 NOTE — PROGRESS NOTES
"Chief Complaint  Establish care, HTN     Subjective    {Problem List  Visit Diagnosis   Encounters  Notes  Medications  Labs  Result Review Imaging  Media :23}    Raymundo Lopez presents to clinic today to establish care with a new provider. Patient was previously following with Dr. Sahni.        Objective   Vital Signs:  /82   Pulse 73   Temp 97.5 °F (36.4 °C)   Ht 175.3 cm (69\")   Wt 107 kg (236 lb)   SpO2 96%   BMI 34.85 kg/m²   Estimated body mass index is 34.85 kg/m² as calculated from the following:    Height as of this encounter: 175.3 cm (69\").    Weight as of this encounter: 107 kg (236 lb).    Physical Exam  Constitutional:       General: He is not in acute distress.     Appearance: Normal appearance.   Cardiovascular:      Rate and Rhythm: Normal rate and regular rhythm.      Heart sounds: No murmur heard.  Pulmonary:      Effort: Pulmonary effort is normal.      Breath sounds: Normal breath sounds. No wheezing.   Musculoskeletal:         General: No swelling or deformity. Normal range of motion.   Skin:     General: Skin is warm and dry.   Neurological:      General: No focal deficit present.      Mental Status: He is oriented to person, place, and time. Mental status is at baseline.   Psychiatric:         Mood and Affect: Mood normal.         Behavior: Behavior normal.        Result Review :{Labs  Result Review  Imaging  Med Tab  Media  Procedures :23}  The following data was reviewed by: Kathy Owens MD on 11/04/2024:  CMP          11/28/2023    12:19 3/7/2024    14:30 6/5/2024    13:21   CMP   Glucose 111  112  118    BUN 20  21  21    Creatinine 1.23  1.18  1.32    EGFR  60.1     Sodium 141  141  140    Potassium 4.7  4.7  4.9    Chloride 102  103  104    Calcium 9.8  9.4  9.2    Total Protein   6.6    Albumin   3.9    Globulin   2.7    Total Bilirubin   0.5    Alkaline Phosphatase   108    AST (SGOT)   18    ALT (SGPT)   17    BUN/Creatinine Ratio 16.3  17.8  16  "   Anion Gap  12.6       CBC          3/7/2024    14:30 6/5/2024    13:21   CBC   WBC 9.86  10.7    RBC 4.57  4.45    Hemoglobin 13.5  12.5    Hematocrit 41.8  40.4    MCV 91.5  91    MCH 29.5  28.1    MCHC 32.3  30.9    RDW 13.5  14.2    Platelets 237  248      Lipid Panel          6/5/2024    13:21   Lipid Panel   Total Cholesterol 127    Triglycerides 88    HDL Cholesterol 52    VLDL Cholesterol 17    LDL Cholesterol  58    LDL/HDL Ratio 1.1      Microalbumin          6/5/2024    13:21   Microalbumin   Microalbumin, Urine 22.7      Data reviewed : Consultant notes Cardiology note 10/3/2024          Current Outpatient Medications:     apixaban (ELIQUIS) 5 MG tablet tablet, Take 1 tablet by mouth Every 12 (Twelve) Hours., Disp: 180 tablet, Rfl: 3    aspirin 81 MG EC tablet, Take 1 tablet by mouth Daily., Disp: , Rfl:     atorvastatin (LIPITOR) 20 MG tablet, Take 1 tablet by mouth every night at bedtime., Disp: 90 tablet, Rfl: 0    losartan (COZAAR) 25 MG tablet, TAKE 1 TABLET BY MOUTH EVERY DAY, Disp: 90 tablet, Rfl: 3    metFORMIN (GLUCOPHAGE) 500 MG tablet, Take 1 tablet by mouth 2 (Two) Times a Day With Meals. (Patient taking differently: Take 2 tablets by mouth 2 (Two) Times a Day With Meals.), Disp: , Rfl:     metoprolol tartrate (LOPRESSOR) 100 MG tablet, TAKE 1 TABLET BY MOUTH TWICE A DAY (Patient taking differently: Take 0.5 tablets by mouth 2 (Two) Times a Day.), Disp: 180 tablet, Rfl: 3    montelukast (SINGULAIR) 10 MG tablet, Take 1 tablet by mouth every night at bedtime., Disp: 90 tablet, Rfl: 0    omeprazole (priLOSEC) 20 MG capsule, Take 1 capsule by mouth Daily., Disp: , Rfl:     SITagliptin (Januvia) 100 MG tablet, Take 0.5 tablets by mouth Daily., Disp: 45 tablet, Rfl: 1    Trelegy Ellipta 100-62.5-25 MCG/INH inhaler, , Disp: , Rfl:     nitroglycerin (Nitrostat) 0.4 MG SL tablet, Place 1 tablet under the tongue Every 5 (Five) Minutes As Needed for Chest Pain. Take no more than 3 doses in 15 minutes.  (Patient not taking: Reported on 11/4/2024), Disp: 25 tablet, Rfl: 3      Assessment and Plan {CC Problem List  Visit Diagnosis   ROS  Review (Popup)  Health Maintenance  Quality  BestPractice  Medications  SmartSets  SnapShot Encounters  Media :23}         {Time Spent (Optional):39685}    Follow Up {Instructions Charge Capture  Follow-up Communications :23}  No follow-ups on file.    Patient was given instructions and counseling regarding his condition or for health maintenance advice. Please see specific information pulled into the AVS if appropriate.     Kathy Owens MD

## 2024-11-05 LAB
ALBUMIN SERPL-MCNC: 3.9 G/DL (ref 3.5–5.2)
ALBUMIN/GLOB SERPL: 1.3 G/DL
ALP SERPL-CCNC: 132 U/L (ref 39–117)
ALT SERPL-CCNC: 20 U/L (ref 1–41)
AST SERPL-CCNC: 20 U/L (ref 1–40)
BASOPHILS # BLD AUTO: 0.06 10*3/MM3 (ref 0–0.2)
BASOPHILS NFR BLD AUTO: 0.6 % (ref 0–1.5)
BILIRUB SERPL-MCNC: 0.6 MG/DL (ref 0–1.2)
BUN SERPL-MCNC: 20 MG/DL (ref 8–23)
BUN/CREAT SERPL: 17.1 (ref 7–25)
CALCIUM SERPL-MCNC: 9.3 MG/DL (ref 8.6–10.5)
CHLORIDE SERPL-SCNC: 103 MMOL/L (ref 98–107)
CHOLEST SERPL-MCNC: 122 MG/DL (ref 0–200)
CO2 SERPL-SCNC: 27.2 MMOL/L (ref 22–29)
CREAT SERPL-MCNC: 1.17 MG/DL (ref 0.76–1.27)
EGFRCR SERPLBLD CKD-EPI 2021: 60.3 ML/MIN/1.73
EOSINOPHIL # BLD AUTO: 0.28 10*3/MM3 (ref 0–0.4)
EOSINOPHIL NFR BLD AUTO: 2.8 % (ref 0.3–6.2)
ERYTHROCYTE [DISTWIDTH] IN BLOOD BY AUTOMATED COUNT: 14.8 % (ref 12.3–15.4)
GLOBULIN SER CALC-MCNC: 3.1 GM/DL
GLUCOSE SERPL-MCNC: 123 MG/DL (ref 65–99)
HBA1C MFR BLD: 6.8 % (ref 4.8–5.6)
HCT VFR BLD AUTO: 41.7 % (ref 37.5–51)
HDLC SERPL-MCNC: 52 MG/DL (ref 40–60)
HGB BLD-MCNC: 13.2 G/DL (ref 13–17.7)
IMM GRANULOCYTES # BLD AUTO: 0.03 10*3/MM3 (ref 0–0.05)
IMM GRANULOCYTES NFR BLD AUTO: 0.3 % (ref 0–0.5)
LDLC SERPL CALC-MCNC: 51 MG/DL (ref 0–100)
LYMPHOCYTES # BLD AUTO: 1.39 10*3/MM3 (ref 0.7–3.1)
LYMPHOCYTES NFR BLD AUTO: 13.7 % (ref 19.6–45.3)
MCH RBC QN AUTO: 27.7 PG (ref 26.6–33)
MCHC RBC AUTO-ENTMCNC: 31.7 G/DL (ref 31.5–35.7)
MCV RBC AUTO: 87.6 FL (ref 79–97)
MONOCYTES # BLD AUTO: 0.93 10*3/MM3 (ref 0.1–0.9)
MONOCYTES NFR BLD AUTO: 9.2 % (ref 5–12)
NEUTROPHILS # BLD AUTO: 7.44 10*3/MM3 (ref 1.7–7)
NEUTROPHILS NFR BLD AUTO: 73.4 % (ref 42.7–76)
NRBC BLD AUTO-RTO: 0 /100 WBC (ref 0–0.2)
PLATELET # BLD AUTO: 242 10*3/MM3 (ref 140–450)
POTASSIUM SERPL-SCNC: 4.5 MMOL/L (ref 3.5–5.2)
PROT SERPL-MCNC: 7 G/DL (ref 6–8.5)
RBC # BLD AUTO: 4.76 10*6/MM3 (ref 4.14–5.8)
SODIUM SERPL-SCNC: 141 MMOL/L (ref 136–145)
TRIGL SERPL-MCNC: 103 MG/DL (ref 0–150)
VLDLC SERPL CALC-MCNC: 19 MG/DL (ref 5–40)
WBC # BLD AUTO: 10.13 10*3/MM3 (ref 3.4–10.8)

## 2024-11-20 RX ORDER — METOPROLOL TARTRATE 100 MG/1
TABLET ORAL
Qty: 180 TABLET | Refills: 3 | Status: SHIPPED | OUTPATIENT
Start: 2024-11-20

## 2025-01-16 RX ORDER — ATORVASTATIN CALCIUM 20 MG/1
20 TABLET, FILM COATED ORAL
Qty: 90 TABLET | Refills: 0 | Status: SHIPPED | OUTPATIENT
Start: 2025-01-16

## 2025-01-16 RX ORDER — MONTELUKAST SODIUM 10 MG/1
10 TABLET ORAL
Qty: 90 TABLET | Refills: 0 | Status: SHIPPED | OUTPATIENT
Start: 2025-01-16

## 2025-01-17 DIAGNOSIS — E11.65 TYPE 2 DIABETES MELLITUS WITH HYPERGLYCEMIA, WITHOUT LONG-TERM CURRENT USE OF INSULIN: ICD-10-CM

## 2025-01-28 ENCOUNTER — OFFICE VISIT (OUTPATIENT)
Dept: CARDIOLOGY | Facility: CLINIC | Age: 89
End: 2025-01-28
Payer: MEDICARE

## 2025-01-28 VITALS
HEART RATE: 81 BPM | HEIGHT: 69 IN | SYSTOLIC BLOOD PRESSURE: 138 MMHG | BODY MASS INDEX: 35.55 KG/M2 | WEIGHT: 240 LBS | DIASTOLIC BLOOD PRESSURE: 76 MMHG

## 2025-01-28 DIAGNOSIS — E78.01 FAMILIAL HYPERCHOLESTEROLEMIA: Primary | Chronic | ICD-10-CM

## 2025-01-28 DIAGNOSIS — I10 ESSENTIAL HYPERTENSION: Chronic | ICD-10-CM

## 2025-01-28 DIAGNOSIS — I21.9 MYOCARDIAL INFARCTION, UNSPECIFIED MI TYPE, UNSPECIFIED ARTERY: Chronic | ICD-10-CM

## 2025-01-28 DIAGNOSIS — I25.118 CORONARY ARTERY DISEASE OF NATIVE ARTERY OF NATIVE HEART WITH STABLE ANGINA PECTORIS: Chronic | ICD-10-CM

## 2025-01-28 DIAGNOSIS — I48.91 ATRIAL FIBRILLATION, CONTROLLED: ICD-10-CM

## 2025-01-28 PROCEDURE — 93000 ELECTROCARDIOGRAM COMPLETE: CPT | Performed by: NURSE PRACTITIONER

## 2025-01-28 PROCEDURE — 1159F MED LIST DOCD IN RCRD: CPT | Performed by: NURSE PRACTITIONER

## 2025-01-28 PROCEDURE — 1160F RVW MEDS BY RX/DR IN RCRD: CPT | Performed by: NURSE PRACTITIONER

## 2025-01-28 PROCEDURE — 99214 OFFICE O/P EST MOD 30 MIN: CPT | Performed by: NURSE PRACTITIONER

## 2025-01-28 NOTE — PROGRESS NOTES
Subjective:     Encounter Date:01/28/2025      Patient ID: Raymundo Lopez is a 89 y.o. male.    Chief Complaint: Follow-up CAD  History of Present Illness  This is an 89-year-old man who follows with Dr. Felton and is new to me today.  He has a past medical history of coronary artery disease status post balloon angioplasty of the diagonal branch in the setting of acute MI, hypertension, hyperlipidemia and chronic lung disease.  It February 2024 he underwent stress test for complaints of chest pain.  This showed a small size, mildly severe area of ischemia in the anterior wall.  He subsequently underwent a cardiac catheterization that showed moderate coronary disease.    He was seen by Dr. Loomis in follow-up in October 2024 and was feeling well.  He was noted to be in new onset atrial fibrillation and was asymptomatic with this.  He was started on apixaban 5 mg twice daily.  As a result his clopidogrel was stopped.  He was set up for an echocardiogram.  This showed hyperdynamic LV systolic function with an EF of 70.9%, moderately dilated right ventricle, moderately dilated right and left atrial cavities and RVSP of 41 mmHg.    He is here today for follow-up visit.  He says for the last 2-3 months, he has felt more short of breath with exertion. He felt short of breath walking in from the parking lot. He also feels short of breath with bending over. It usually takes less than 5 minutes for his breathing to improve. He denies any swelling, orthopnea or PND. No reported chest pain. His prior anginal symptom was left sided chest pain radiating down his left arm. He denies palpitations or feeling his heart race. No reported bleeding issues on Eliquis.     I have reviewed and updated as appropriate allergies, current medications, past family history, past medical history, past surgical history and problem list.    Review of Systems   Constitutional: Negative for fever, malaise/fatigue, weight gain and weight loss.    HENT:  Negative for congestion, hoarse voice and sore throat.    Eyes:  Negative for blurred vision and double vision.   Cardiovascular:  Negative for chest pain, dyspnea on exertion, leg swelling, orthopnea, palpitations and syncope.   Respiratory:  Negative for cough, shortness of breath and wheezing.    Gastrointestinal:  Negative for abdominal pain, hematemesis, hematochezia and melena.   Genitourinary:  Negative for dysuria and hematuria.   Neurological:  Negative for dizziness, headaches, light-headedness and numbness.   Psychiatric/Behavioral:  Negative for depression. The patient is not nervous/anxious.          Current Outpatient Medications:     apixaban (ELIQUIS) 5 MG tablet tablet, Take 1 tablet by mouth Every 12 (Twelve) Hours., Disp: 180 tablet, Rfl: 3    aspirin 81 MG EC tablet, Take 1 tablet by mouth Daily., Disp: , Rfl:     atorvastatin (LIPITOR) 20 MG tablet, TAKE 1 TABLET BY MOUTH EVERYDAY AT BEDTIME, Disp: 90 tablet, Rfl: 0    losartan (COZAAR) 25 MG tablet, TAKE 1 TABLET BY MOUTH EVERY DAY, Disp: 90 tablet, Rfl: 3    metFORMIN (GLUCOPHAGE) 500 MG tablet, Take 2 tablets by mouth 2 (Two) Times a Day With Meals., Disp: , Rfl:     metoprolol tartrate (LOPRESSOR) 100 MG tablet, TAKE 1 TABLET BY MOUTH TWICE A DAY, Disp: 180 tablet, Rfl: 3    montelukast (SINGULAIR) 10 MG tablet, TAKE 1 TABLET BY MOUTH EVERYDAY AT BEDTIME, Disp: 90 tablet, Rfl: 0    nitroglycerin (Nitrostat) 0.4 MG SL tablet, Place 1 tablet under the tongue Every 5 (Five) Minutes As Needed for Chest Pain. Take no more than 3 doses in 15 minutes. (Patient not taking: Reported on 11/4/2024), Disp: 25 tablet, Rfl: 3    omeprazole (priLOSEC) 20 MG capsule, Take 1 capsule by mouth Daily., Disp: , Rfl:     SITagliptin (Januvia) 100 MG tablet, Take 0.5 tablets by mouth Daily., Disp: 45 tablet, Rfl: 1    Trelegy Ellipta 100-62.5-25 MCG/INH inhaler, , Disp: , Rfl:     Past Medical History:   Diagnosis Date    Angina pectoris     Arthritis      Asthma     Garcia esophagus     CAD (coronary artery disease)     Cancer     prostate    Cataract     Emphysema of lung     H/O electrocardiogram     Health care maintenance     History of EKG 2015    Hyperlipidemia     Hypertension     Myocardial infarction     Shortness of breath        Past Surgical History:   Procedure Laterality Date    ANAL FISTULA REPAIR      CARDIAC CATHETERIZATION N/A 3/8/2024    Procedure: Left Heart Cath;  Surgeon: Jax Loomis MD;  Location: [facility] CATH INVASIVE LOCATION;  Service: Cardiovascular;  Laterality: N/A;    COLONOSCOPY N/A 3/29/2017    Procedure: COLONOSCOPY TO CECUM & T.I. WITH COLD POLYPECTOMIES,  HOT SNARE POLYPECTOMIES;  Surgeon: Oren HARPER MD;  Location: [facility] ENDOSCOPY;  Service:     ENDOSCOPY N/A 2016    Procedure: ESOPHAGOGASTRODUODENOSCOPY WITH BIOPSIES;  Surgeon: Oren HARPER MD;  Location: [facility] ENDOSCOPY;  Service:     PROSTATECTOMY         No family history on file.    Social History     Tobacco Use    Smoking status: Former     Current packs/day: 0.00     Average packs/day: 1 pack/day for 34.0 years (34.0 ttl pk-yrs)     Types: Cigarettes     Start date:      Quit date:      Years since quittin.1     Passive exposure: Never    Smokeless tobacco: Never    Tobacco comments:     Caffeine use    Vaping Use    Vaping status: Never Used   Substance Use Topics    Alcohol use: No    Drug use: No         ECG 12 Lead    Date/Time: 2025 12:40 PM  Performed by: Marilu Wheeler APRN    Authorized by: Marilu Wheeler APRN  Comparison: compared with previous ECG from 10/3/2024  Similar to previous ECG  Rhythm: atrial fibrillation  BPM: 81             Objective:     There were no vitals taken for this visit.          Physical Exam  Constitutional:       Appearance: Normal appearance. He is obese.   HENT:      Head: Normocephalic.   Neck:      Vascular: No carotid bruit.   Cardiovascular:      Rate and Rhythm: Normal rate.  Rhythm irregularly irregular.      Chest Wall: PMI is not displaced.      Pulses: Normal pulses.           Radial pulses are 2+ on the right side.        Posterior tibial pulses are 2+ on the right side and 2+ on the left side.      Heart sounds: Normal heart sounds. No murmur heard.     No friction rub. No gallop.   Pulmonary:      Effort: Pulmonary effort is normal.      Breath sounds: Normal breath sounds.   Abdominal:      General: Bowel sounds are normal. There is no distension.      Palpations: Abdomen is soft.   Musculoskeletal:      Right lower leg: No edema.      Left lower leg: No edema.   Skin:     General: Skin is warm and dry.      Capillary Refill: Capillary refill takes less than 2 seconds.   Neurological:      Mental Status: He is alert and oriented to person, place, and time.   Psychiatric:         Mood and Affect: Mood normal.         Behavior: Behavior normal.         Thought Content: Thought content normal.          Lab Review:   Lipid Panel          6/5/2024    13:21 11/4/2024    15:35   Lipid Panel   Total Cholesterol 127  122    Triglycerides 88  103    HDL Cholesterol 52  52    VLDL Cholesterol 17  19    LDL Cholesterol  58  51    LDL/HDL Ratio 1.1           Cardiac Procedures:   Echocardiogram 10/24/2024:    Left ventricular systolic function is hyperdynamic (EF > 70%). Calculated left ventricular EF = 70.9%    Left ventricular diastolic function was indeterminate.    The right ventricular cavity is moderately dilated.    The left atrial cavity is moderately dilated.    The right atrial cavity is moderately  dilated.    Estimated right ventricular systolic pressure from tricuspid regurgitation is mildly elevated (35-45 mmHg). Calculated right ventricular systolic pressure from tricuspid regurgitation is 41 mmHg.    Cardiac Catheterization 3/8/2024  Moderate coronary artery disease noted within the LAD at 20% and a 60% mid diagonal branch segment stenoses and a small vessel, 20% proximal  circumflex segment stenoses, 40% proximal RCA segment stenoses.  Normal LVEDP of 11 mmHg     Stress MPI 2//2024:  Impressions are consistent with an intermediate risk study.  Left ventricular ejection fraction is hyperdynamic (Calculated EF > 70%).  Myocardial perfusion imaging indicates a small-sized, mildly severe area of ischemia located in the anterior wall.  LVEF with stress is 80%.     Stress MPI 4/30/2021:  There is a very small, medium intensity reversible defect in the distal inferior wall that may represent ischemia. The study was technically difficult due to profound intense subdiaphragmatic GI uptake of the radiotracer.  Left ventricular ejection fraction is hyperdynamic (Calculated EF > 70%).     Assessment:         Diagnoses and all orders for this visit:    1. Familial hypercholesterolemia (Primary)    2. Myocardial infarction, unspecified MI type, unspecified artery    3. Essential hypertension    4. Coronary artery disease of native artery of native heart with stable angina pectoris    5. Atrial fibrillation, controlled            Plan:       Shortness of breath: exertional in nature. He had an echo in October 2024 that was unremarkable. Cardiac catheterization in March 2024 showed moderate CAD. EKG is stable. Will place a monitor to see if he is having elevated heart rates with activity that may be potentially causing his shortness of breath.  CAD: cath in March 2024 showed 20% LAD stenosis, 60% mid diagonal, 20% proximal circ, and 40% proximal RCA. Now with reported shortness of breath. Low suspicion this is progression of his CAD. Will await monitor results before pursuing further ischemic workup. Continue aspirin and statin, beta blocker.  PAF: on Eliquis with no bleeding issues. Plan as above in #1  HTN: blood pressure stable. No changes.  HLD: on statin. Goal LDL < 70.    Thank you for allowing me to participate in this patient's care. Please call with any questions or concerns. Will determine  follow up after testing has resulted.          Your medication list            Accurate as of January 28, 2025 10:01 AM. If you have any questions, ask your nurse or doctor.                CONTINUE taking these medications        Instructions Last Dose Given Next Dose Due   apixaban 5 MG tablet tablet  Commonly known as: ELIQUIS      Take 1 tablet by mouth Every 12 (Twelve) Hours.       aspirin 81 MG EC tablet      Take 1 tablet by mouth Daily.       atorvastatin 20 MG tablet  Commonly known as: LIPITOR      TAKE 1 TABLET BY MOUTH EVERYDAY AT BEDTIME       losartan 25 MG tablet  Commonly known as: COZAAR      TAKE 1 TABLET BY MOUTH EVERY DAY       metFORMIN 500 MG tablet  Commonly known as: GLUCOPHAGE      Take 2 tablets by mouth 2 (Two) Times a Day With Meals.       metoprolol tartrate 100 MG tablet  Commonly known as: LOPRESSOR      TAKE 1 TABLET BY MOUTH TWICE A DAY       montelukast 10 MG tablet  Commonly known as: SINGULAIR      TAKE 1 TABLET BY MOUTH EVERYDAY AT BEDTIME       nitroglycerin 0.4 MG SL tablet  Commonly known as: Nitrostat      Place 1 tablet under the tongue Every 5 (Five) Minutes As Needed for Chest Pain. Take no more than 3 doses in 15 minutes.       omeprazole 20 MG capsule  Commonly known as: priLOSEC      Take 1 capsule by mouth Daily.       SITagliptin 100 MG tablet  Commonly known as: Januvia      Take 0.5 tablets by mouth Daily.       Trelegy Ellipta 100-62.5-25 MCG/INH inhaler  Generic drug: Fluticasone-Umeclidin-Vilant                      Marilu Wheeler, EDWARD  01/28/25  10:01 AM EST

## 2025-02-03 ENCOUNTER — TELEPHONE (OUTPATIENT)
Dept: CARDIOLOGY | Facility: CLINIC | Age: 89
End: 2025-02-03

## 2025-02-03 NOTE — TELEPHONE ENCOUNTER
Reviewed aMrilu's message with Raymundo Lopez and he verbalized understanding of plan.  Patient stated that he does also have asthma.  Explained to patient he may need to follow up with his PCP/lung doctor as well and he verbalized understanding.  Will await monitor result.    Thank you,  Lizeth TOLENTINO RN  Triage Nurse [facility]  02/03/25 13:42 EST

## 2025-02-03 NOTE — TELEPHONE ENCOUNTER
"Caller: Raymundo Lopez \"Julien\"    Relationship to patient: Self    Best call back number: [phone]    Patient is needing: PT CALLING TO REPORT THAT HE HAS BEEN HAVING SOB UPON LIGHT EXERTION. PT STATES IT GETS BETTER QUICKLY WHEN HE RESTS. PT WOULD ALSO LIKE TO KNOW IF HE SHOULD BE DOING ANYTHING SPECIFIC BEFORE THE RESULTS OF HIS HOLTER MONITOR ARE BACK. PLEASE CALL PT TO ADVISE.         "

## 2025-02-10 ENCOUNTER — TELEPHONE (OUTPATIENT)
Dept: CARDIOLOGY | Facility: CLINIC | Age: 89
End: 2025-02-10
Payer: MEDICARE

## 2025-02-10 NOTE — TELEPHONE ENCOUNTER
Discussed results of monitor with patient. He does have sleep apnea but is intolerant to CPAP. Some symptoms did correlate to PVCs. Would not recommend increasing metoprolol due the pauses during the night. He has an appointment with me on 4/3/25. He would like to see Dr. Loomis for his next appt. Will see if we can arrange this.

## 2025-04-03 ENCOUNTER — OFFICE VISIT (OUTPATIENT)
Age: 89
End: 2025-04-03
Payer: MEDICARE

## 2025-04-03 VITALS
WEIGHT: 235 LBS | HEART RATE: 67 BPM | HEIGHT: 69 IN | BODY MASS INDEX: 34.8 KG/M2 | SYSTOLIC BLOOD PRESSURE: 116 MMHG | DIASTOLIC BLOOD PRESSURE: 70 MMHG

## 2025-04-03 DIAGNOSIS — E66.01 SEVERE OBESITY (BMI 35.0-39.9) WITH COMORBIDITY: ICD-10-CM

## 2025-04-03 DIAGNOSIS — E78.01 FAMILIAL HYPERCHOLESTEROLEMIA: ICD-10-CM

## 2025-04-03 DIAGNOSIS — I48.91 ATRIAL FIBRILLATION, CONTROLLED: Primary | ICD-10-CM

## 2025-04-03 DIAGNOSIS — I25.118 CORONARY ARTERY DISEASE OF NATIVE ARTERY OF NATIVE HEART WITH STABLE ANGINA PECTORIS: ICD-10-CM

## 2025-04-03 DIAGNOSIS — I10 ESSENTIAL HYPERTENSION: ICD-10-CM

## 2025-04-03 NOTE — PROGRESS NOTES
[facility] Follow Up Office Note     Encounter Date:25  Patient:Raymundo Lopez  :1935  MRN:[id number]      Chief Complaint: Follow up on CAD    Chief Complaint   Patient presents with    Coronary Artery Disease     6 month f/u     History of Presenting Illness:      Mr. Lopez is a 89 y.o. gentleman with past medical history notable for coronary artery disease status post balloon angioplasty of a diagonal branch in the setting of an acute myocardial infarction, hypertension, mixed hyperlipidemia, and chronic lung disease who presents to our office for scheduled follow up.  Overall doing about the same still has some shortness of breath generalized fatigue some occasional chest discomfort.  I last saw him about 6 months ago at that visit we did get a repeat echocardiogram which was fairly stable.      Review of Systems:  Review of Systems   Constitutional: Positive for malaise/fatigue.   HENT: Negative.     Eyes: Negative.    Cardiovascular:  Positive for chest pain.   Respiratory: Negative.     Endocrine: Negative.    Hematologic/Lymphatic: Negative.    Skin: Negative.    Musculoskeletal: Negative.    Gastrointestinal: Negative.    Genitourinary: Negative.    Neurological: Negative.    Psychiatric/Behavioral: Negative.     Allergic/Immunologic: Negative.        Current Outpatient Medications on File Prior to Visit   Medication Sig Dispense Refill    apixaban (ELIQUIS) 5 MG tablet tablet Take 1 tablet by mouth Every 12 (Twelve) Hours. 180 tablet 3    aspirin 81 MG EC tablet Take 1 tablet by mouth Daily.      atorvastatin (LIPITOR) 20 MG tablet TAKE 1 TABLET BY MOUTH EVERYDAY AT BEDTIME 90 tablet 0    losartan (COZAAR) 25 MG tablet TAKE 1 TABLET BY MOUTH EVERY DAY (Patient taking differently: Take 1 tablet by mouth 2 (Two) Times a Day.) 90 tablet 3    metFORMIN (GLUCOPHAGE) 500 MG tablet Take 2 tablets by mouth 2 (Two) Times a Day With Meals.      montelukast (SINGULAIR) 10 MG tablet TAKE 1  TABLET BY MOUTH EVERYDAY AT BEDTIME 90 tablet 0    nitroglycerin (Nitrostat) 0.4 MG SL tablet Place 1 tablet under the tongue Every 5 (Five) Minutes As Needed for Chest Pain. Take no more than 3 doses in 15 minutes. 25 tablet 3    omeprazole (priLOSEC) 20 MG capsule Take 1 capsule by mouth Daily.      SITagliptin (Januvia) 100 MG tablet Take 0.5 tablets by mouth Daily. 45 tablet 1    Trelegy Ellipta 100-62.5-25 MCG/INH inhaler       metoprolol tartrate (LOPRESSOR) 100 MG tablet TAKE 1 TABLET BY MOUTH TWICE A  tablet 3     No current facility-administered medications on file prior to visit.       No Known Allergies    Past Medical History:   Diagnosis Date    Angina pectoris     Arthritis     Asthma     Garcia esophagus     CAD (coronary artery disease)     Cancer     prostate    Cataract     Emphysema of lung     H/O electrocardiogram     Health care maintenance     History of EKG 2015    Hyperlipidemia     Hypertension     Myocardial infarction     Shortness of breath        Past Surgical History:   Procedure Laterality Date    ANAL FISTULA REPAIR      CARDIAC CATHETERIZATION N/A 3/8/2024    Procedure: Left Heart Cath;  Surgeon: Jax Loomis MD;  Location: [facility] CATH INVASIVE LOCATION;  Service: Cardiovascular;  Laterality: N/A;    COLONOSCOPY N/A 3/29/2017    Procedure: COLONOSCOPY TO CECUM & T.I. WITH COLD POLYPECTOMIES,  HOT SNARE POLYPECTOMIES;  Surgeon: Oren HARPER MD;  Location: [facility] ENDOSCOPY;  Service:     ENDOSCOPY N/A 2016    Procedure: ESOPHAGOGASTRODUODENOSCOPY WITH BIOPSIES;  Surgeon: Oren HARPER MD;  Location: [facility] ENDOSCOPY;  Service:     PROSTATECTOMY         Social History     Socioeconomic History    Marital status:    Tobacco Use    Smoking status: Former     Current packs/day: 0.00     Average packs/day: 1 pack/day for 34.0 years (34.0 ttl pk-yrs)     Types: Cigarettes     Start date:      Quit date:      Years since quittin.2  "    Passive exposure: Never    Smokeless tobacco: Never    Tobacco comments:     Caffeine use    Vaping Use    Vaping status: Never Used   Substance and Sexual Activity    Alcohol use: No    Drug use: No    Sexual activity: Defer       History reviewed. No pertinent family history.    The following portions of the patient's history were reviewed and updated as appropriate: allergies, current medications, past family history, past medical history, past social history, past surgical history and problem list.       Objective:       Vitals:    04/03/25 1129   BP: 116/70   BP Location: Left arm   Patient Position: Sitting   Pulse: 67   Weight: 107 kg (235 lb)   Height: 175.3 cm (69\")       Body mass index is 34.7 kg/m².     Physical Exam:  Constitutional: Well appearing, Well-developed, No acute distress   HENT: Oropharynx clear and membrane moist  Eyes: Normal conjunctiva, no sclera icterus.  Neck: Supple, no carotid bruit bilaterally.  Cardiovascular: Irregularly irregular rate and rhythm, No Murmur, No bilateral lower extremity edema.  Pulmonary: Normal respiratory effort, normal lung sounds, no wheezing.  Neurological: Alert and orient x 3.   Skin: Warm, dry, no ecchymosis, no rash.  Psych: Appropriate mood and affect. Normal judgment and insight.          Lab Results   Component Value Date    GLUCOSE 123 (H) 11/04/2024    BUN 20 11/04/2024    CREATININE 1.17 11/04/2024    EGFRIFNONA 76 10/05/2021    EGFRIFAFRI 93 10/05/2021    BCR 17.1 11/04/2024    K 4.5 11/04/2024    CO2 27.2 11/04/2024    CALCIUM 9.3 11/04/2024    ALBUMIN 3.9 11/04/2024    AST 20 11/04/2024    ALT 20 11/04/2024       Lab Results   Component Value Date    WBC 10.13 11/04/2024    HGB 13.2 11/04/2024    HCT 41.7 11/04/2024    MCV 87.6 11/04/2024     11/04/2024       No results found for: \"CKTOTAL\", \"CKMB\", \"CKMBINDEX\", \"TROPONINI\", \"TROPONINT\"    No results found for: \"PROBNP\"    Lab Results   Component Value Date    CHOL 125 10/09/2019    " CHLPL 122 11/04/2024    CHLPL 127 06/05/2024    CHLPL 126 07/24/2023     Lab Results   Component Value Date    TRIG 103 11/04/2024    TRIG 88 06/05/2024    TRIG 70 07/24/2023     Lab Results   Component Value Date    HDL 52 11/04/2024    HDL 52 06/05/2024    HDL 58 07/24/2023     Lab Results   Component Value Date    LDL 51 11/04/2024    LDL 58 06/05/2024    LDL 54 07/24/2023       Lab Results   Component Value Date    TSH 2.120 07/24/2023           ECG 12 Lead    Date/Time: 4/3/2025 12:13 PM  Performed by: Jax Loomis MD    Authorized by: Jax Loomis MD  Comparison: compared with previous ECG from 1/28/2025  Similar to previous ECG  Rhythm: atrial fibrillation            Monitor 2/7/2025 tracings reviewed by myself:  A relatively benign monitor study.  Underlying heart rhythm was atrial fibrillation with an average heart rate of 76 bpm and a heart rate range of 30 bpm up to 141 bpm  1.3% burden of premature ventricular contractions  There were for pauses which occurred during the study the longest was 6 seconds all pauses occurred from the hours of 1 AM to 4 AM  Patient triggered events correlated with episodes of premature ventricular contractions in the setting of his known atrial fibrillation    Echocardiogram 10/20/2024 images reviewed by myself:  Left ventricular systolic function is hyperdynamic (EF > 70%). Calculated left ventricular EF = 70.9%  Left ventricular diastolic function was indeterminate.  The right ventricular cavity is moderately dilated.  The left atrial cavity is moderately dilated.  The right atrial cavity is moderately  dilated.  Estimated right ventricular systolic pressure from tricuspid regurgitation is mildly elevated (35-45 mmHg). Calculated right ventricular systolic pressure from tricuspid regurgitation is 41 mmHg.    Cardiac Catheterization 3/8/2024  Moderate coronary artery disease noted within the LAD at 20% and a 60% mid diagonal branch segment stenoses and a small  vessel, 20% proximal circumflex segment stenoses, 40% proximal RCA segment stenoses.  Normal LVEDP of 11 mmHg    Stress MPI 2//2024:  Impressions are consistent with an intermediate risk study.  Left ventricular ejection fraction is hyperdynamic (Calculated EF > 70%).  Myocardial perfusion imaging indicates a small-sized, mildly severe area of ischemia located in the anterior wall.  LVEF with stress is 80%.    Stress MPI 4/30/2021:  There is a very small, medium intensity reversible defect in the distal inferior wall that may represent ischemia. The study was technically difficult due to profound intense subdiaphragmatic GI uptake of the radiotracer.  Left ventricular ejection fraction is hyperdynamic (Calculated EF > 70%).        Assessment:          Diagnosis Plan   1. Atrial fibrillation, controlled  ECG 12 Lead      2. Familial hypercholesterolemia        3. Essential hypertension        4. Coronary artery disease of native artery of native heart with stable angina pectoris        5. Severe obesity (BMI 35.0-39.9) with comorbidity                     Plan:       Mr. Lopez is a 89 y.o. gentleman with past medical history notable for coronary artery disease status post balloon angioplasty of a diagonal branch in the setting of an acute myocardial infarction, hypertension, mixed hyperlipidemia, and chronic lung disease who presents to our office for scheduled follow-up.  In general still doing about the same fairly extensive testing with cardiac catheterization and echocardiogram and monitoring.  No major issues.  We did discuss the importance of weight loss his BMI is elevated and that would probably help out with some of his shortness of breath and symptoms of fatigue.  Otherwise I would continue with his current medical regimen no changes are needed      Paroxysmal Atrial Fibrillation:  Asymptomatic  Well rate controlled on current therapy no changes  [id number] score 5 will continuet Eliquis 5 mg twice  daily    Coronary artery disease with angina:  Continue aspirin   Continue metoprolol 100 mg  Continue statin    Hypertension:  Well controlled    Mixed Hyperlipidemia:  Continue statin   Lipid panel 11/2024 demonstrates normal well controlled LDL and Total Cholesterol  CMP 11/2024 normal ALT and AST    Obesity:  Body mass index is 34.7 kg/m².   Encourage increase activity and weight loss which would help out with managing his atrial fibrillation and symptoms of fatigue and shortness of breath      Follow up:  6 months      Jax Loomis MD  [facility]  04/03/25  12:15 EDT

## 2025-04-16 RX ORDER — MONTELUKAST SODIUM 10 MG/1
10 TABLET ORAL
Qty: 90 TABLET | Refills: 0 | Status: SHIPPED | OUTPATIENT
Start: 2025-04-16

## 2025-04-16 RX ORDER — ATORVASTATIN CALCIUM 20 MG/1
20 TABLET, FILM COATED ORAL
Qty: 90 TABLET | Refills: 0 | Status: SHIPPED | OUTPATIENT
Start: 2025-04-16

## 2025-06-09 ENCOUNTER — HOSPITAL ENCOUNTER (OUTPATIENT)
Facility: HOSPITAL | Age: 89
Discharge: HOME | End: 2025-06-09
Admitting: STUDENT IN AN ORGANIZED HEALTH CARE EDUCATION/TRAINING PROGRAM
Payer: MEDICARE

## 2025-06-09 ENCOUNTER — OFFICE VISIT (OUTPATIENT)
Dept: INTERNAL MEDICINE | Facility: CLINIC | Age: 89
End: 2025-06-09
Payer: MEDICARE

## 2025-06-09 VITALS
OXYGEN SATURATION: 95 % | BODY MASS INDEX: 34.8 KG/M2 | RESPIRATION RATE: 18 BRPM | WEIGHT: 235 LBS | SYSTOLIC BLOOD PRESSURE: 124 MMHG | HEIGHT: 69 IN | HEART RATE: 110 BPM | DIASTOLIC BLOOD PRESSURE: 84 MMHG

## 2025-06-09 DIAGNOSIS — E78.01 FAMILIAL HYPERCHOLESTEROLEMIA: Chronic | ICD-10-CM

## 2025-06-09 DIAGNOSIS — E66.811 OBESITY, CLASS I, BMI 30-34.9: ICD-10-CM

## 2025-06-09 DIAGNOSIS — K21.9 GASTRIC REFLUX: ICD-10-CM

## 2025-06-09 DIAGNOSIS — I10 ESSENTIAL HYPERTENSION: Chronic | ICD-10-CM

## 2025-06-09 DIAGNOSIS — E11.9 TYPE 2 DIABETES MELLITUS WITHOUT COMPLICATION, WITHOUT LONG-TERM CURRENT USE OF INSULIN: Chronic | ICD-10-CM

## 2025-06-09 DIAGNOSIS — J45.40 MODERATE PERSISTENT ASTHMA WITHOUT COMPLICATION: Chronic | ICD-10-CM

## 2025-06-09 DIAGNOSIS — R06.09 DYSPNEA ON EXERTION: ICD-10-CM

## 2025-06-09 DIAGNOSIS — Z00.00 MEDICARE ANNUAL WELLNESS VISIT, SUBSEQUENT: Primary | ICD-10-CM

## 2025-06-09 DIAGNOSIS — I48.91 ATRIAL FIBRILLATION, CONTROLLED: Chronic | ICD-10-CM

## 2025-06-09 PROCEDURE — 1159F MED LIST DOCD IN RCRD: CPT | Performed by: STUDENT IN AN ORGANIZED HEALTH CARE EDUCATION/TRAINING PROGRAM

## 2025-06-09 PROCEDURE — 1170F FXNL STATUS ASSESSED: CPT | Performed by: STUDENT IN AN ORGANIZED HEALTH CARE EDUCATION/TRAINING PROGRAM

## 2025-06-09 PROCEDURE — G0439 PPPS, SUBSEQ VISIT: HCPCS | Performed by: STUDENT IN AN ORGANIZED HEALTH CARE EDUCATION/TRAINING PROGRAM

## 2025-06-09 PROCEDURE — 1160F RVW MEDS BY RX/DR IN RCRD: CPT | Performed by: STUDENT IN AN ORGANIZED HEALTH CARE EDUCATION/TRAINING PROGRAM

## 2025-06-09 PROCEDURE — G2211 COMPLEX E/M VISIT ADD ON: HCPCS | Performed by: STUDENT IN AN ORGANIZED HEALTH CARE EDUCATION/TRAINING PROGRAM

## 2025-06-09 PROCEDURE — 99214 OFFICE O/P EST MOD 30 MIN: CPT | Performed by: STUDENT IN AN ORGANIZED HEALTH CARE EDUCATION/TRAINING PROGRAM

## 2025-06-09 PROCEDURE — 71046 X-RAY EXAM CHEST 2 VIEWS: CPT

## 2025-06-09 PROCEDURE — 1126F AMNT PAIN NOTED NONE PRSNT: CPT | Performed by: STUDENT IN AN ORGANIZED HEALTH CARE EDUCATION/TRAINING PROGRAM

## 2025-06-09 RX ORDER — OMEPRAZOLE 20 MG/1
20 CAPSULE, DELAYED RELEASE ORAL DAILY
Qty: 90 CAPSULE | Refills: 1 | Status: SHIPPED | OUTPATIENT
Start: 2025-06-09

## 2025-06-09 NOTE — ASSESSMENT & PLAN NOTE
With Trelegy inhaler daily.   Worsening shortness of breath and cough.   Repeat PFT ordered today.

## 2025-06-09 NOTE — PROGRESS NOTES
Subjective   The ABCs of the Annual Wellness Visit  Medicare Wellness Visit    Raymundo Lopez is a 89 y.o. patient who presents for a Medicare Wellness Visit.    The following portions of the patient's history were reviewed and   updated as appropriate: allergies, current medications, past family history, past medical history, past social history, past surgical history, and problem list.    Compared to one year ago, the patient's physical   health is worse, related to dyspnea symptom.    Compared to one year ago, the patient's mental   health is the same.    Recent Hospitalizations:  He was not admitted to the hospital during the last year.     Current Medical Providers:  Patient Care Team:  Kathy Owens MD as PCP - General (Internal Medicine)  Jax Loomis MD as Consulting Physician (Cardiology)  Franck Gao MD as Consulting Physician (Allergy)    Outpatient Medications Prior to Visit   Medication Sig Dispense Refill    apixaban (ELIQUIS) 5 MG tablet tablet Take 1 tablet by mouth Every 12 (Twelve) Hours. 180 tablet 3    aspirin 81 MG EC tablet Take 1 tablet by mouth Daily.      atorvastatin (LIPITOR) 20 MG tablet TAKE 1 TABLET BY MOUTH EVERYDAY AT BEDTIME 90 tablet 0    losartan (COZAAR) 25 MG tablet TAKE 1 TABLET BY MOUTH EVERY DAY (Patient taking differently: Take 1 tablet by mouth 2 (Two) Times a Day.) 90 tablet 3    metFORMIN (GLUCOPHAGE) 500 MG tablet Take 2 tablets by mouth 2 (Two) Times a Day With Meals.      metoprolol tartrate (LOPRESSOR) 100 MG tablet TAKE 1 TABLET BY MOUTH TWICE A  tablet 3    montelukast (SINGULAIR) 10 MG tablet TAKE 1 TABLET BY MOUTH EVERYDAY AT BEDTIME 90 tablet 0    nitroglycerin (Nitrostat) 0.4 MG SL tablet Place 1 tablet under the tongue Every 5 (Five) Minutes As Needed for Chest Pain. Take no more than 3 doses in 15 minutes. 25 tablet 3    Trelegy Ellipta 100-62.5-25 MCG/INH inhaler       omeprazole (priLOSEC) 20 MG capsule Take 1 capsule by mouth Daily.   "    SITagliptin (Januvia) 100 MG tablet Take 0.5 tablets by mouth Daily. 45 tablet 1     No facility-administered medications prior to visit.     No opioid medication identified on active medication list. I have reviewed chart for other potential  high risk medication/s and harmful drug interactions in the elderly.      Aspirin is on active medication list. Aspirin use is indicated based on review of current medical condition/s. Pros and cons of this therapy have been discussed today. Benefits of this medication outweigh potential harm.  Patient has been encouraged to continue taking this medication.  .    Patient Active Problem List   Diagnosis    Essential hypertension    Familial hypercholesterolemia    Garcia's esophagus without dysplasia    Prostate cancer    Myocardial infarction    Coronary artery disease of native artery of native heart with stable angina pectoris    Moderate persistent asthma without complication    Type 2 diabetes mellitus without complication, without long-term current use of insulin    Chronic right hip pain    Abnormal nuclear stress test    Atrial fibrillation, controlled    Bilateral impacted cerumen    Severe obesity (BMI 35.0-39.9) with comorbidity     Advance Care Planning Advance Directive is not on file.  ACP discussion was held with the patient during this visit. Patient does not have an advance directive, information provided.      Objective   Vitals:    06/09/25 1055   BP: 124/84   BP Location: Left arm   Patient Position: Sitting   Cuff Size: Adult   Pulse: 110   Resp: 18   SpO2: 95%   Weight: 107 kg (235 lb)   Height: 175.3 cm (69\")   PainSc: 0-No pain       Estimated body mass index is 34.7 kg/m² as calculated from the following:    Height as of this encounter: 175.3 cm (69\").    Weight as of this encounter: 107 kg (235 lb).    BMI is >= 30 and <35. (Class 1 Obesity). The following options were offered after discussion;: pharmacological intervention options         Does " the patient have evidence of cognitive impairment? No                                                                                           Health  Risk Assessment    Smoking Status:  Social History     Tobacco Use   Smoking Status Former    Current packs/day: 0.00    Average packs/day: 1 pack/day for 34.0 years (34.0 ttl pk-yrs)    Types: Cigarettes    Start date:     Quit date:     Years since quittin.4    Passive exposure: Never   Smokeless Tobacco Never   Tobacco Comments    Caffeine use      Alcohol Consumption:  Social History     Substance and Sexual Activity   Alcohol Use No     Fall Risk Screen  STEADI Fall Risk Assessment was completed, and patient is at LOW risk for falls.Assessment completed on:2025    Depression Screening   Little interest or pleasure in doing things? Not at all   Feeling down, depressed, or hopeless? Not at all   PHQ-2 Total Score 0      Health Habits and Functional and Cognitive Screenin/9/2025    11:00 AM   Functional & Cognitive Status   Do you have difficulty preparing food and eating? No   Do you have difficulty bathing yourself, getting dressed or grooming yourself? No   Do you have difficulty using the toilet? No   Do you have difficulty moving around from place to place? No   Do you have trouble with steps or getting out of a bed or a chair? No   Current Diet Limited Junk Food   Dental Exam Up to date   Eye Exam Up to date   Exercise (times per week) 0 times per week   Current Exercises Include No Regular Exercise   Do you need help using the phone?  No   Are you deaf or do you have serious difficulty hearing?  Yes   Do you need help to go to places out of walking distance? Yes   Do you need help shopping? Yes   Do you need help preparing meals?  No   Do you need help with housework?  No   Do you need help with laundry? No   Do you need help taking your medications? No   Do you need help managing money? No   Do you ever drive or ride in a car  without wearing a seat belt? No   Have you felt unusual stress, anger or loneliness in the last month? No   Who do you live with? Alone   If you need help, do you have trouble finding someone available to you? No   Have you been bothered in the last four weeks by sexual problems? No   Do you have difficulty concentrating, remembering or making decisions? No           Age-appropriate Screening Schedule:  Refer to the list below for future screening recommendations based on patient's age, sex and/or medical conditions. Orders for these recommended tests are listed in the plan section. The patient has been provided with a written plan.    Health Maintenance List  Health Maintenance   Topic Date Due    DIABETIC FOOT EXAM  Never done    RSV Vaccine - Adults (1 - 1-dose 75+ series) Never done    DIABETIC EYE EXAM  10/23/2021    HEMOGLOBIN A1C  05/04/2025    ANNUAL WELLNESS VISIT  06/05/2025    URINE MICROALBUMIN-CREATININE RATIO (uACR)  06/05/2025    INFLUENZA VACCINE  07/01/2025    LIPID PANEL  11/04/2025    TDAP/TD VACCINES (2 - Td or Tdap) 10/17/2027    Pneumococcal Vaccine 50+  Completed    ZOSTER VACCINE  Completed    COVID-19 Vaccine  Discontinued                                                                                                                                              CMS Preventative Services Quick Reference  Risk Factors Identified During Encounter  Hearing Problem: States that he knows that it is bad, but is going to get hearing aids on his own.    The above risks/problems have been discussed with the patient.  Pertinent information has been shared with the patient in the After Visit Summary.  An After Visit Summary and PPPS were made available to the patient.    Follow Up:     Next Medicare Wellness visit to be scheduled in 1 year.    Additional E&M Note during same encounter follows:  Patient has additional, significant, and separately identifiable condition(s)/problem(s) that require work  "above and beyond the Medicare Wellness Visit     Chief Complaint  Medicare Wellness-subsequent, shortness of breath    Subjective   HPI  Julien is also being seen today for additional medical problems.      Patient complaining of some worsening shortness of breath on exertion. States this is a chronic symptom but has been worsening over the past year. Thought that it might be related to atrial fibrillation, but states that at the last visit with his cardiologist he was advised that obesity may be contributing. Of note, patient uses trelegy inhaler daily but does not follow with pulmonology. She reports some trouble blowing air all the way out and notes a chronic productive cough. Does not remember the last time that he had a lung function test or saw a lung specialist.     Interested in Ozempic injections for both treatment of diabetes and potential weight loss benefits.     Requests refill of omeprazole. States that this controls his GERD symptoms well. Was following with GI specialist, but states that he has not seen their service in a while.   History of Present Illness      Objective   Vital Signs:  /84 (BP Location: Left arm, Patient Position: Sitting, Cuff Size: Adult)   Pulse 110   Resp 18   Ht 175.3 cm (69\")   Wt 107 kg (235 lb)   SpO2 95%   BMI 34.70 kg/m²     Physical Exam  Constitutional:       General: He is not in acute distress.     Appearance: Normal appearance.   Cardiovascular:      Rate and Rhythm: Normal rate. Rhythm irregular.      Heart sounds: No murmur heard.  Pulmonary:      Effort: Pulmonary effort is normal.      Breath sounds: Wheezing present.      Comments: Coughing occasionally.  Musculoskeletal:         General: No swelling or deformity. Normal range of motion.   Skin:     General: Skin is warm and dry.   Neurological:      General: No focal deficit present.      Mental Status: He is oriented to person, place, and time. Mental status is at baseline.   Psychiatric:         Mood " and Affect: Mood normal.         Behavior: Behavior normal.          Assessment and Plan      Medicare annual wellness visit, subsequent  As above.   Essential hypertension  Hypertension is stable and controlled  Continue current treatment regimen.  Blood pressure will be reassessed in 6 months.  Type 2 diabetes mellitus without complication, without long-term current use of insulin  Controlled.   Continue metformin daily.   DC Januvia, start Ozempic 0.25mg weekly.   Familial hypercholesterolemia  Continue atorvastatin 20mg daily.   Atrial fibrillation, controlled  Following with cardiology.   Continue BB and AC as prescribed.   Dyspnea on exertion  Likely a combination of chronic lung disease, cardiac disease, and obesity.   PFT and chest x-ray pending.   Continue trelegy as prescribed.   Follows with cardiology.   Adding Ozempic to medication regimen.   Moderate persistent asthma without complication  With Trelegy inhaler daily.   Worsening shortness of breath and cough.   Repeat PFT ordered today.   Gastric reflux  Omeprazole 20mg daily.  Advised routine follow up with GI.   Obesity, Class I, BMI 30-34.9  Start Ozempic for weight loss and T2DM treament.     Orders Placed This Encounter   Procedures    XR Chest PA & Lateral     Standing Status:   Future     Expected Date:   6/9/2025     Expiration Date:   9/9/2026     Reason for Exam::   Chronic shortness of breath     Release to patient:   Routine Release [[id number]]    Comprehensive Metabolic Panel     Release to patient:   Routine Release [[id number]]    Lipid Panel     Release to patient:   Routine Release [[id number]]    Hemoglobin A1c     Release to patient:   Routine Release [[id number]]    Microalbumin / Creatinine Urine Ratio - Urine, Clean Catch     Release to patient:   Routine Release [[id number]]    Complete PFT - Pre & Post Bronchodilator     Standing Status:   Future     Expected Date:   6/9/2025     Expiration Date:   6/9/2026     Testing to Be  Performed:   Complete PFT - Pre & Post Bronchodilator     Release to patient:   Routine Release [[id number]]     New Medications Ordered This Visit   Medications    omeprazole (priLOSEC) 20 MG capsule     Sig: Take 1 capsule by mouth Daily.     Dispense:  90 capsule     Refill:  1    Semaglutide,0.25 or 0.5MG/DOS, (OZEMPIC) 2 MG/3ML solution pen-injector     Sig: Inject 0.25 mg under the skin into the appropriate area as directed 1 (One) Time Per Week.     Dispense:  1.5 mL     Refill:  2          Follow Up   Return in about 6 months (around 12/9/2025) for routine follow up, lab work.    Patient was given instructions and counseling regarding his condition or for health maintenance advice. Please see specific information pulled into the AVS if appropriate.

## 2025-06-10 LAB
ALBUMIN SERPL-MCNC: 3.8 G/DL (ref 3.5–5.2)
ALBUMIN/CREAT UR: 15 MG/G CREAT (ref 0–29)
ALBUMIN/GLOB SERPL: 1.3 G/DL
ALP SERPL-CCNC: 134 U/L (ref 39–117)
ALT SERPL-CCNC: 17 U/L (ref 1–41)
AST SERPL-CCNC: 21 U/L (ref 1–40)
BILIRUB SERPL-MCNC: 0.9 MG/DL (ref 0–1.2)
BUN SERPL-MCNC: 22 MG/DL (ref 8–23)
BUN/CREAT SERPL: 19.6 (ref 7–25)
CALCIUM SERPL-MCNC: 8.9 MG/DL (ref 8.6–10.5)
CHLORIDE SERPL-SCNC: 102 MMOL/L (ref 98–107)
CHOLEST SERPL-MCNC: 115 MG/DL (ref 0–200)
CO2 SERPL-SCNC: 24.7 MMOL/L (ref 22–29)
CREAT SERPL-MCNC: 1.12 MG/DL (ref 0.76–1.27)
CREAT UR-MCNC: 133.1 MG/DL
EGFRCR SERPLBLD CKD-EPI 2021: 63.2 ML/MIN/1.73
GLOBULIN SER CALC-MCNC: 2.9 GM/DL
GLUCOSE SERPL-MCNC: 201 MG/DL (ref 65–99)
HBA1C MFR BLD: 6.7 % (ref 4.8–5.6)
HDLC SERPL-MCNC: 51 MG/DL (ref 40–60)
LDLC SERPL CALC-MCNC: 47 MG/DL (ref 0–100)
MICROALBUMIN UR-MCNC: 20.1 UG/ML
POTASSIUM SERPL-SCNC: 4.5 MMOL/L (ref 3.5–5.2)
PROT SERPL-MCNC: 6.7 G/DL (ref 6–8.5)
SODIUM SERPL-SCNC: 139 MMOL/L (ref 136–145)
TRIGL SERPL-MCNC: 87 MG/DL (ref 0–150)
VLDLC SERPL CALC-MCNC: 17 MG/DL (ref 5–40)

## 2025-06-11 RX ORDER — ATORVASTATIN CALCIUM 20 MG/1
20 TABLET, FILM COATED ORAL
Qty: 90 TABLET | Refills: 0 | Status: SHIPPED | OUTPATIENT
Start: 2025-06-11

## 2025-06-12 ENCOUNTER — TRANSCRIBE ORDERS (OUTPATIENT)
Dept: ADMINISTRATIVE | Facility: HOSPITAL | Age: 89
End: 2025-06-12
Payer: MEDICARE

## 2025-06-12 DIAGNOSIS — R06.00 DYSPNEA, UNSPECIFIED TYPE: ICD-10-CM

## 2025-06-12 DIAGNOSIS — J44.9 CHRONIC OBSTRUCTIVE PULMONARY DISEASE, UNSPECIFIED COPD TYPE: Primary | ICD-10-CM

## 2025-06-12 DIAGNOSIS — F17.219 CIGARETTE NICOTINE DEPENDENCE WITH NICOTINE-INDUCED DISORDER: ICD-10-CM

## 2025-06-20 ENCOUNTER — HOSPITAL ENCOUNTER (OUTPATIENT)
Dept: CT IMAGING | Facility: HOSPITAL | Age: 89
Discharge: HOME | End: 2025-06-20
Payer: MEDICARE

## 2025-06-20 DIAGNOSIS — R06.00 DYSPNEA, UNSPECIFIED TYPE: ICD-10-CM

## 2025-06-20 DIAGNOSIS — F17.219 CIGARETTE NICOTINE DEPENDENCE WITH NICOTINE-INDUCED DISORDER: ICD-10-CM

## 2025-06-20 DIAGNOSIS — J44.9 CHRONIC OBSTRUCTIVE PULMONARY DISEASE, UNSPECIFIED COPD TYPE: ICD-10-CM

## 2025-06-20 PROCEDURE — 71250 CT THORAX DX C-: CPT

## 2025-06-23 ENCOUNTER — TRANSCRIBE ORDERS (OUTPATIENT)
Dept: ADMINISTRATIVE | Facility: HOSPITAL | Age: 89
End: 2025-06-23
Payer: MEDICARE

## 2025-06-23 DIAGNOSIS — J44.9 CHRONIC OBSTRUCTIVE PULMONARY DISEASE, UNSPECIFIED COPD TYPE: Primary | ICD-10-CM

## 2025-06-23 DIAGNOSIS — R06.00 DYSPNEA, UNSPECIFIED TYPE: ICD-10-CM

## 2025-06-27 ENCOUNTER — HOSPITAL ENCOUNTER (OUTPATIENT)
Dept: RESPIRATORY THERAPY | Facility: HOSPITAL | Age: 89
Discharge: HOME | End: 2025-06-27
Payer: MEDICARE

## 2025-06-27 DIAGNOSIS — R06.09 DYSPNEA ON EXERTION: ICD-10-CM

## 2025-06-27 DIAGNOSIS — J45.40 MODERATE PERSISTENT ASTHMA WITHOUT COMPLICATION: Chronic | ICD-10-CM

## 2025-06-27 LAB
BDY SITE: ABNORMAL
HGB BLDA-MCNC: 10.7 G/DL (ref 12–18)

## 2025-06-27 PROCEDURE — 94060 EVALUATION OF WHEEZING: CPT

## 2025-06-27 PROCEDURE — 82820 HEMOGLOBIN-OXYGEN AFFINITY: CPT | Performed by: STUDENT IN AN ORGANIZED HEALTH CARE EDUCATION/TRAINING PROGRAM

## 2025-06-27 PROCEDURE — 94726 PLETHYSMOGRAPHY LUNG VOLUMES: CPT

## 2025-06-27 PROCEDURE — 94729 DIFFUSING CAPACITY: CPT

## 2025-06-27 RX ORDER — ALBUTEROL SULFATE 0.83 MG/ML
2.5 SOLUTION RESPIRATORY (INHALATION) ONCE AS NEEDED
Status: COMPLETED | OUTPATIENT
Start: 2025-06-27 | End: 2025-06-27

## 2025-06-27 RX ADMIN — ALBUTEROL SULFATE 2.5 MG: 2.5 SOLUTION RESPIRATORY (INHALATION) at 10:15

## 2025-06-30 DIAGNOSIS — J45.40 MODERATE PERSISTENT ASTHMA WITHOUT COMPLICATION: Primary | Chronic | ICD-10-CM

## 2025-06-30 RX ORDER — ALBUTEROL SULFATE 90 UG/1
2 INHALANT RESPIRATORY (INHALATION) EVERY 6 HOURS PRN
Qty: 18 G | Refills: 3 | Status: SHIPPED | OUTPATIENT
Start: 2025-06-30

## 2025-07-01 DIAGNOSIS — J98.4 MIXED RESTRICTIVE AND OBSTRUCTIVE LUNG DISEASE: Primary | ICD-10-CM

## 2025-07-01 DIAGNOSIS — J44.9 MIXED RESTRICTIVE AND OBSTRUCTIVE LUNG DISEASE: Primary | ICD-10-CM

## 2025-07-01 DIAGNOSIS — R06.09 DYSPNEA ON EXERTION: ICD-10-CM

## 2025-07-08 ENCOUNTER — TRANSCRIBE ORDERS (OUTPATIENT)
Dept: ADMINISTRATIVE | Facility: HOSPITAL | Age: 89
End: 2025-07-08
Payer: MEDICARE

## 2025-07-08 DIAGNOSIS — J44.9 CHRONIC OBSTRUCTIVE PULMONARY DISEASE, UNSPECIFIED COPD TYPE: Primary | ICD-10-CM

## 2025-07-08 DIAGNOSIS — R06.00 DYSPNEA, UNSPECIFIED TYPE: ICD-10-CM

## (undated) DEVICE — KT MANIFLD CARDIAC

## (undated) DEVICE — RADIFOCUS OPTITORQUE ANGIOGRAPHIC CATHETER: Brand: OPTITORQUE

## (undated) DEVICE — THE TORRENT IRRIGATION SCOPE CONNECTOR IS USED WITH THE TORRENT IRRIGATION TUBING TO PROVIDE IRRIGATION FLUIDS SUCH AS STERILE WATER DURING GASTROINTESTINAL ENDOSCOPIC PROCEDURES WHEN USED IN CONJUNCTION WITH AN IRRIGATION PUMP (OR ELECTROSURGICAL UNIT).: Brand: TORRENT

## (undated) DEVICE — PAD GRND REM POLYHESIVE A/ DISP

## (undated) DEVICE — DGW .035 FC J3MM 260CM TEF: Brand: EMERALD

## (undated) DEVICE — Device: Brand: DEFENDO AIR/WATER/SUCTION AND BIOPSY VALVE

## (undated) DEVICE — SINGLE-USE BIOPSY FORCEPS: Brand: RADIAL JAW 4

## (undated) DEVICE — CANNULA,ADULT,SOFT-TOUCH,7TUBE,SC: Brand: MEDLINE

## (undated) DEVICE — GLIDESHEATH SLENDER STAINLESS STEEL KIT: Brand: GLIDESHEATH SLENDER

## (undated) DEVICE — SNAR POLYP SENSATION STDOVL 27 240 BX40

## (undated) DEVICE — THE SINGLE USE ETRAP – POLYP TRAP IS USED FOR SUCTION RETRIEVAL OF ENDOSCOPICALLY REMOVED POLYPS.: Brand: ETRAP

## (undated) DEVICE — PK CATH CARD 40

## (undated) DEVICE — TUBING, SUCTION, 1/4" X 10', STRAIGHT: Brand: MEDLINE